# Patient Record
Sex: FEMALE | Race: WHITE | NOT HISPANIC OR LATINO | Employment: OTHER | URBAN - METROPOLITAN AREA
[De-identification: names, ages, dates, MRNs, and addresses within clinical notes are randomized per-mention and may not be internally consistent; named-entity substitution may affect disease eponyms.]

---

## 2018-03-25 ENCOUNTER — HOSPITAL ENCOUNTER (EMERGENCY)
Facility: HOSPITAL | Age: 68
Discharge: HOME | End: 2018-03-25
Attending: EMERGENCY MEDICINE
Payer: MEDICARE

## 2018-03-25 ENCOUNTER — APPOINTMENT (EMERGENCY)
Dept: RADIOLOGY | Facility: HOSPITAL | Age: 68
End: 2018-03-25
Payer: MEDICARE

## 2018-03-25 ENCOUNTER — APPOINTMENT (EMERGENCY)
Dept: ULTRASOUND IMAGING | Facility: HOSPITAL | Age: 68
End: 2018-03-25
Payer: MEDICARE

## 2018-03-25 VITALS
HEART RATE: 99 BPM | RESPIRATION RATE: 20 BRPM | OXYGEN SATURATION: 95 % | HEIGHT: 62 IN | DIASTOLIC BLOOD PRESSURE: 66 MMHG | WEIGHT: 192 LBS | SYSTOLIC BLOOD PRESSURE: 158 MMHG | TEMPERATURE: 97.2 F | BODY MASS INDEX: 35.33 KG/M2

## 2018-03-25 DIAGNOSIS — M71.20 BAKER'S CYST: Primary | ICD-10-CM

## 2018-03-25 PROCEDURE — 73590 X-RAY EXAM OF LOWER LEG: CPT

## 2018-03-25 PROCEDURE — 93971 EXTREMITY STUDY: CPT

## 2018-03-25 PROCEDURE — 96372 THER/PROPH/DIAG INJ SC/IM: CPT

## 2018-03-25 PROCEDURE — 99284 EMERGENCY DEPT VISIT MOD MDM: CPT

## 2018-03-25 RX ORDER — OXYCODONE HYDROCHLORIDE 5 MG/1
5 TABLET ORAL EVERY 4 HOURS PRN
Qty: 12 TABLET | Refills: 0 | Status: SHIPPED | OUTPATIENT
Start: 2018-03-25 | End: 2018-04-04

## 2018-03-25 RX ADMIN — HYDROMORPHONE HYDROCHLORIDE 1 MG: 1 INJECTION, SOLUTION INTRAMUSCULAR; INTRAVENOUS; SUBCUTANEOUS at 16:08
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
## 2018-03-25 NOTE — DISCHARGE INSTRUCTIONS
Bakers Cyst   WHAT YOU NEED TO KNOW:   A Bakers cyst, or popliteal cyst, is a bulging lump behind your knee  Inside the lump is a sac filled with fluid  The cyst is caused by fluid buildup in your knee joint  This can happen if you have a knee injury, such as a cartilage tear  Osteoarthritis or rheumatoid arthritis can also cause an abnormal buildup of joint fluid  DISCHARGE INSTRUCTIONS:   Return to the emergency department if:   · You have severe pain  · You have bruising on the ankle below the cyst     · Your calf turns blue below the cyst     · Your calf or knee is swollen or bleeding  Contact your healthcare provider if:   · You have a fever  · Your pain does not improve with medicine  · You have questions or concerns about your condition or care  Medicines:   · NSAIDs  help decrease swelling and pain  This medicine is available without a doctor's order  Your healthcare provider will tell you which medicine to take and how often to take it  Follow directions  NSAIDs can cause stomach bleeding or kidney problems if they are not taken correctly  · Take your medicine as directed  Contact your healthcare provider if you think your medicine is not helping or if you have side effects  Tell him of her if you are allergic to any medicine  Keep a list of the medicines, vitamins, and herbs you take  Include the amounts, and when and why you take them  Bring the list or the pill bottles to follow-up visits  Carry your medicine list with you in case of an emergency  Care for your knee:   · Rest as needed  Limit movement as your knee heals  This will help decrease the risk of more damage to your knee  You may need crutches to take weight off your injured knee  Use crutches as directed  · Ice your knee  Ice helps decrease swelling and pain  Use an ice pack, or put ice in a plastic bag  Cover the ice pack with a towel and place the ice on your knee for 15 to 20 minutes, 3 to 4 times each day   Do this for 2 to 3 days  · Support your knee  Wrap your knee with an elastic bandage  Ask your healthcare provider if you need a brace for more support  This will help decrease swelling and movement so your knee can heal     · Elevate your knee  Use pillows to raise your knee above the level of your heart as often as you can  This will help decrease swelling  · Go to physical therapy as directed  A physical therapist teaches you exercises to help improve movement and strength, and to decrease pain  Follow up with your healthcare provider as directed:  Write down your questions so you remember to ask them during your visits  © 2017 [facility] Information is for End User's use only and may not be sold, redistributed or otherwise used for commercial purposes  All illustrations and images included in CareNotes® are the copyrighted property of [employer]  or Ricky Murillo  The above information is an  only  It is not intended as medical advice for individual conditions or treatments  Talk to your doctor, nurse or pharmacist before following any medical regimen to see if it is safe and effective for you

## 2018-03-26 PROCEDURE — 93971 EXTREMITY STUDY: CPT | Performed by: SURGERY

## 2019-01-04 RX ORDER — INFLUENZA A VIRUSA/MICHIGAN/45/2015 X-275 (H1N1) ANTIGEN (FORMALDEHYDE INACTIVATED), INFLUENZA A VIRUS A/HONG KONG/4801/2014 X-263B (H3N2) ANTIGEN (FORMALDEHYDE INACTIVATED), AND INFLUENZA B VIRUS B/BRISBANE/60/2008 ANTIGEN (FORMALDEHYDE INACTIVATED) 60; 60; 60 UG/.5ML; UG/.5ML; UG/.5ML
INJECTION, SUSPENSION INTRAMUSCULAR
Refills: 0 | COMMUNITY
Start: 2018-11-19 | End: 2019-07-11

## 2019-01-07 ENCOUNTER — OFFICE VISIT (OUTPATIENT)
Dept: FAMILY MEDICINE CLINIC | Facility: CLINIC | Age: 69
End: 2019-01-07
Payer: COMMERCIAL

## 2019-01-07 VITALS
TEMPERATURE: 96.9 F | DIASTOLIC BLOOD PRESSURE: 94 MMHG | OXYGEN SATURATION: 97 % | HEIGHT: 62 IN | SYSTOLIC BLOOD PRESSURE: 164 MMHG | RESPIRATION RATE: 16 BRPM | HEART RATE: 97 BPM | WEIGHT: 186 LBS | BODY MASS INDEX: 34.23 KG/M2

## 2019-01-07 DIAGNOSIS — Z12.39 SCREENING FOR BREAST CANCER: ICD-10-CM

## 2019-01-07 DIAGNOSIS — J30.2 SEASONAL ALLERGIES: ICD-10-CM

## 2019-01-07 DIAGNOSIS — R73.03 PREDIABETES: ICD-10-CM

## 2019-01-07 DIAGNOSIS — Z13.820 SCREENING FOR OSTEOPOROSIS: ICD-10-CM

## 2019-01-07 DIAGNOSIS — E78.5 DYSLIPIDEMIA: ICD-10-CM

## 2019-01-07 DIAGNOSIS — Z78.0 POST-MENOPAUSAL: ICD-10-CM

## 2019-01-07 DIAGNOSIS — I10 ESSENTIAL HYPERTENSION: Primary | ICD-10-CM

## 2019-01-07 DIAGNOSIS — J06.9 UPPER RESPIRATORY TRACT INFECTION, UNSPECIFIED TYPE: ICD-10-CM

## 2019-01-07 LAB — SL AMB POCT HEMOGLOBIN AIC: 6.3 (ref ?–6.5)

## 2019-01-07 PROCEDURE — 83036 HEMOGLOBIN GLYCOSYLATED A1C: CPT | Performed by: INTERNAL MEDICINE

## 2019-01-07 PROCEDURE — 3725F SCREEN DEPRESSION PERFORMED: CPT | Performed by: INTERNAL MEDICINE

## 2019-01-07 PROCEDURE — 99203 OFFICE O/P NEW LOW 30 MIN: CPT | Performed by: INTERNAL MEDICINE

## 2019-01-07 RX ORDER — CETIRIZINE HYDROCHLORIDE 10 MG/1
10 TABLET ORAL DAILY
Qty: 90 TABLET | Refills: 1 | Status: SHIPPED | OUTPATIENT
Start: 2019-01-07 | End: 2021-12-21

## 2019-01-07 RX ORDER — PANTOPRAZOLE SODIUM 40 MG/1
40 TABLET, DELAYED RELEASE ORAL
COMMUNITY
End: 2019-07-11 | Stop reason: SDUPTHER

## 2019-01-07 RX ORDER — ASCORBATE CALCIUM 500 MG
500 TABLET ORAL
COMMUNITY
End: 2019-01-16

## 2019-01-07 RX ORDER — ATORVASTATIN CALCIUM 40 MG/1
40 TABLET, FILM COATED ORAL DAILY
Qty: 90 TABLET | Refills: 1 | Status: SHIPPED | OUTPATIENT
Start: 2019-01-07 | End: 2019-03-30 | Stop reason: SDUPTHER

## 2019-01-07 RX ORDER — GABAPENTIN 300 MG/1
300 CAPSULE ORAL
COMMUNITY
End: 2020-04-21 | Stop reason: SDUPTHER

## 2019-01-07 RX ORDER — LOSARTAN POTASSIUM 50 MG/1
50 TABLET ORAL
COMMUNITY
End: 2019-01-07 | Stop reason: SDUPTHER

## 2019-01-07 RX ORDER — ALBUTEROL SULFATE 90 UG/1
2 AEROSOL, METERED RESPIRATORY (INHALATION) EVERY 6 HOURS PRN
COMMUNITY
Start: 2018-08-21 | End: 2019-08-21

## 2019-01-07 RX ORDER — ASCORBIC ACID 500 MG
500 TABLET ORAL DAILY
Qty: 90 TABLET | Refills: 1 | Status: SHIPPED | OUTPATIENT
Start: 2019-01-07 | End: 2019-07-11

## 2019-01-07 RX ORDER — CETIRIZINE HYDROCHLORIDE 10 MG/1
10 TABLET ORAL
COMMUNITY
End: 2019-01-07 | Stop reason: SDUPTHER

## 2019-01-07 RX ORDER — LOSARTAN POTASSIUM 50 MG/1
50 TABLET ORAL DAILY
Qty: 90 TABLET | Refills: 1 | Status: SHIPPED | OUTPATIENT
Start: 2019-01-07 | End: 2019-03-30 | Stop reason: SDUPTHER

## 2019-01-07 RX ORDER — AMLODIPINE BESYLATE 5 MG/1
5 TABLET ORAL
COMMUNITY
End: 2019-01-07 | Stop reason: SDUPTHER

## 2019-01-07 RX ORDER — ATORVASTATIN CALCIUM 40 MG/1
40 TABLET, FILM COATED ORAL
COMMUNITY
End: 2019-01-07 | Stop reason: SDUPTHER

## 2019-01-07 RX ORDER — AMOXICILLIN 500 MG/1
500 CAPSULE ORAL EVERY 8 HOURS SCHEDULED
Qty: 30 CAPSULE | Refills: 0 | Status: SHIPPED | OUTPATIENT
Start: 2019-01-07 | End: 2019-01-17

## 2019-01-07 RX ORDER — AMLODIPINE BESYLATE 5 MG/1
5 TABLET ORAL DAILY
Qty: 90 TABLET | Refills: 1 | Status: SHIPPED | OUTPATIENT
Start: 2019-01-07 | End: 2019-03-30 | Stop reason: SDUPTHER

## 2019-01-07 RX ORDER — METFORMIN HYDROCHLORIDE 500 MG/1
500 TABLET, FILM COATED, EXTENDED RELEASE ORAL DAILY
COMMUNITY
End: 2019-07-11 | Stop reason: SDUPTHER

## 2019-01-07 NOTE — PROGRESS NOTES
Assessment/Plan:         Diagnoses and all orders for this visit:    Essential hypertension  Comments:  reorder med and recheck bp before her travels  Orders:  -     losartan (COZAAR) 50 mg tablet; Take 1 tablet (50 mg total) by mouth daily for 90 days  -     amLODIPine (NORVASC) 5 mg tablet; Take 1 tablet (5 mg total) by mouth daily    Screening for breast cancer  -     Mammo screening bilateral w cad; Future    Screening for osteoporosis  -     DXA bone density spine hip and pelvis; Future    Prediabetes  Comments:  maintain metformen  Orders:  -     POCT hemoglobin A1c  -     Microalbumin / creatinine urine ratio    Dyslipidemia  -     atorvastatin (LIPITOR) 40 mg tablet; Take 1 tablet (40 mg total) by mouth daily for 90 days  -     Lipid panel; Future  -     Comprehensive metabolic panel; Future    Upper respiratory tract infection, unspecified type  Comments:  rx amox  Orders:  -     ascorbic acid (VITAMIN C) 500 mg tablet; Take 1 tablet (500 mg total) by mouth daily  -     amoxicillin (AMOXIL) 500 mg capsule; Take 1 capsule (500 mg total) by mouth every 8 (eight) hours for 10 days    Seasonal allergies  Comments:  continue zyrtec  Orders:  -     cetirizine (ZyrTEC) 10 mg tablet; Take 1 tablet (10 mg total) by mouth daily for 90 days    Post-menopausal  -     Calcium Carbonate-Vitamin D 600-400 MG-UNIT per chew tablet; Chew 1 tablet 2 (two) times a day for 90 days    Other orders  -     FLUZONE HIGH-DOSE 0 5 ML JOSE A; TO BE GIVEN BY PHARMACIST PER STANDING ORDER  -     albuterol (PROAIR HFA) 90 mcg/act inhaler; Inhale 2 puffs every 6 (six) hours as needed  -     Discontinue: amLODIPine (NORVASC) 5 mg tablet; Take 5 mg by mouth  -     Calcium Ascorbate 500 MG TABS; Take 500 mg by mouth  -     Discontinue: atorvastatin (LIPITOR) 40 mg tablet; Take 40 mg by mouth  -     Discontinue: Calcium Carbonate-Vitamin D 600-400 MG-UNIT per chew tablet; Chew  -     Discontinue: cetirizine (ZyrTEC) 10 mg tablet;  Take 10 mg by mouth  -     Cranberry 450 MG TABS; Take by mouth  -     gabapentin (NEURONTIN) 300 mg capsule; Take 300 mg by mouth bid   -     Discontinue: losartan (COZAAR) 50 mg tablet; Take 50 mg by mouth  -     metFORMIN (GLUMETZA) 500 MG (MOD) 24 hr tablet; Take 500 mg by mouth daily  -     Multiple Vitamins-Minerals (MULTIVITAMIN ADULT PO); Take by mouth  -     pantoprazole (PROTONIX) 40 mg tablet; Take 40 mg by mouth          Subjective:      Patient ID: Henna Lopez is a 79 y o  female  Pt ran out of bp meds  Needs rx  Denies cp/sob/headaches  Pt states she is prediabetic  Denies neuropathy, ckd, retinopathy   +low back pain for which she takes gabapentine  +face pressure  a1c today 6 3        The following portions of the patient's history were reviewed and updated as appropriate:   She  has a past medical history of Asthma; Back pain; COPD (chronic obstructive pulmonary disease) ([facility] [address] ); COPD (chronic obstructive pulmonary disease) ([facility] [address] ); DVT (deep venous thrombosis) ([facility] ); GERD (gastroesophageal reflux disease); Hyperlipidemia; Hypertension; and Pre-diabetes  She   Patient Active Problem List    Diagnosis Date Noted    Essential hypertension 2019    Prediabetes 2019     She  has a past surgical history that includes  section; Colonoscopy; Hysterectomy; Appendectomy; Esophagogastroduodenoscopy; Breast lumpectomy (Left); and Breast biopsy  Her family history is not on file  She  reports that she has been smoking  She has never used smokeless tobacco  She reports that she does not drink alcohol or use drugs    Current Outpatient Prescriptions   Medication Sig Dispense Refill    albuterol (PROAIR HFA) 90 mcg/act inhaler Inhale 2 puffs every 6 (six) hours as needed      amLODIPine (NORVASC) 5 mg tablet Take 1 tablet (5 mg total) by mouth daily 90 tablet 1    atorvastatin (LIPITOR) 40 mg tablet Take 1 tablet (40 mg total) by mouth daily for 90 days 90 tablet 1    Calcium Ascorbate 500 MG TABS Take 500 mg by mouth      Calcium Carbonate-Vitamin D 600-400 MG-UNIT per chew tablet Chew 1 tablet 2 (two) times a day for 90 days 180 tablet 0    cetirizine (ZyrTEC) 10 mg tablet Take 1 tablet (10 mg total) by mouth daily for 90 days 90 tablet 1    Cranberry 450 MG TABS Take by mouth      gabapentin (NEURONTIN) 300 mg capsule Take 300 mg by mouth bid       losartan (COZAAR) 50 mg tablet Take 1 tablet (50 mg total) by mouth daily for 90 days 90 tablet 1    metFORMIN (GLUMETZA) 500 MG (MOD) 24 hr tablet Take 500 mg by mouth daily      Multiple Vitamins-Minerals (MULTIVITAMIN ADULT PO) Take by mouth      pantoprazole (PROTONIX) 40 mg tablet Take 40 mg by mouth      amoxicillin (AMOXIL) 500 mg capsule Take 1 capsule (500 mg total) by mouth every 8 (eight) hours for 10 days 30 capsule 0    ascorbic acid (VITAMIN C) 500 mg tablet Take 1 tablet (500 mg total) by mouth daily 90 tablet 1    FLUZONE HIGH-DOSE 0 5 ML JOSE A TO BE GIVEN BY PHARMACIST PER STANDING ORDER  0     No current facility-administered medications for this visit  No current outpatient prescriptions on file prior to visit  No current facility-administered medications on file prior to visit  She is allergic to sulfa antibiotics       Review of Systems   Constitutional: Negative  Negative for fever  HENT: Positive for sinus pressure  Respiratory: Negative  Cardiovascular: Negative  Musculoskeletal: Positive for back pain  Neurological: Negative for headaches  Objective:      /94 (BP Location: Left arm, Patient Position: Sitting, Cuff Size: Large)   Pulse 97   Temp (!) 96 9 °F (36 1 °C) (Tympanic)   Resp 16   Ht 5' 1 5" (1 562 m)   Wt 84 4 kg (186 lb)   SpO2 97%   BMI 34 58 kg/m²          Physical Exam   Constitutional: She appears well-developed and well-nourished  HENT:   Head: Normocephalic and atraumatic     Right Ear: External ear normal    Left Ear: External ear normal    Nose: Nose normal    Mouth/Throat: Oropharynx is clear and moist  No oropharyngeal exudate  Neck: Normal range of motion  Neck supple  No tracheal deviation present  No thyromegaly present  Cardiovascular: Normal rate, regular rhythm and normal heart sounds  Exam reveals no gallop and no friction rub  No murmur heard  Pulmonary/Chest: Effort normal and breath sounds normal  No respiratory distress  She has no wheezes  She has no rales  Lymphadenopathy:     She has no cervical adenopathy

## 2019-01-10 ENCOUNTER — APPOINTMENT (OUTPATIENT)
Dept: LAB | Facility: MEDICAL CENTER | Age: 69
End: 2019-01-10
Payer: COMMERCIAL

## 2019-01-10 DIAGNOSIS — E78.5 DYSLIPIDEMIA: ICD-10-CM

## 2019-01-10 LAB
ALBUMIN SERPL BCP-MCNC: 3.6 G/DL (ref 3.5–5)
ALP SERPL-CCNC: 62 U/L (ref 46–116)
ALT SERPL W P-5'-P-CCNC: 20 U/L (ref 12–78)
ANION GAP SERPL CALCULATED.3IONS-SCNC: 7 MMOL/L (ref 4–13)
AST SERPL W P-5'-P-CCNC: 15 U/L (ref 5–45)
BILIRUB SERPL-MCNC: 0.55 MG/DL (ref 0.2–1)
BUN SERPL-MCNC: 17 MG/DL (ref 5–25)
CALCIUM SERPL-MCNC: 9.6 MG/DL (ref 8.3–10.1)
CHLORIDE SERPL-SCNC: 104 MMOL/L (ref 100–108)
CHOLEST SERPL-MCNC: 185 MG/DL (ref 50–200)
CO2 SERPL-SCNC: 27 MMOL/L (ref 21–32)
CREAT SERPL-MCNC: 1.12 MG/DL (ref 0.6–1.3)
CREAT UR-MCNC: 194 MG/DL
GFR SERPL CREATININE-BSD FRML MDRD: 51 ML/MIN/1.73SQ M
GLUCOSE P FAST SERPL-MCNC: 79 MG/DL (ref 65–99)
HDLC SERPL-MCNC: 39 MG/DL (ref 40–60)
LDLC SERPL CALC-MCNC: 76 MG/DL (ref 0–100)
MICROALBUMIN UR-MCNC: 83.5 MG/L (ref 0–20)
MICROALBUMIN/CREAT 24H UR: 43 MG/G CREATININE (ref 0–30)
NONHDLC SERPL-MCNC: 146 MG/DL
POTASSIUM SERPL-SCNC: 4 MMOL/L (ref 3.5–5.3)
PROT SERPL-MCNC: 7.4 G/DL (ref 6.4–8.2)
SODIUM SERPL-SCNC: 138 MMOL/L (ref 136–145)
TRIGL SERPL-MCNC: 351 MG/DL

## 2019-01-10 PROCEDURE — 82043 UR ALBUMIN QUANTITATIVE: CPT | Performed by: INTERNAL MEDICINE

## 2019-01-10 PROCEDURE — 80061 LIPID PANEL: CPT

## 2019-01-10 PROCEDURE — 82570 ASSAY OF URINE CREATININE: CPT | Performed by: INTERNAL MEDICINE

## 2019-01-10 PROCEDURE — 80053 COMPREHEN METABOLIC PANEL: CPT

## 2019-01-10 PROCEDURE — 36415 COLL VENOUS BLD VENIPUNCTURE: CPT

## 2019-01-16 ENCOUNTER — OFFICE VISIT (OUTPATIENT)
Dept: FAMILY MEDICINE CLINIC | Facility: CLINIC | Age: 69
End: 2019-01-16
Payer: COMMERCIAL

## 2019-01-16 VITALS
HEIGHT: 62 IN | DIASTOLIC BLOOD PRESSURE: 76 MMHG | TEMPERATURE: 97.4 F | BODY MASS INDEX: 34.23 KG/M2 | OXYGEN SATURATION: 94 % | SYSTOLIC BLOOD PRESSURE: 136 MMHG | HEART RATE: 114 BPM | RESPIRATION RATE: 16 BRPM | WEIGHT: 186 LBS

## 2019-01-16 DIAGNOSIS — R73.03 PREDIABETES: ICD-10-CM

## 2019-01-16 DIAGNOSIS — I10 ESSENTIAL HYPERTENSION: Primary | ICD-10-CM

## 2019-01-16 DIAGNOSIS — J44.9 CHRONIC OBSTRUCTIVE PULMONARY DISEASE, UNSPECIFIED COPD TYPE (HCC): ICD-10-CM

## 2019-01-16 DIAGNOSIS — E78.5 HYPERLIPIDEMIA, UNSPECIFIED HYPERLIPIDEMIA TYPE: ICD-10-CM

## 2019-01-16 PROBLEM — I82.409 DVT (DEEP VENOUS THROMBOSIS) (HCC): Status: RESOLVED | Noted: 2019-01-16 | Resolved: 2019-01-16

## 2019-01-16 PROCEDURE — 3075F SYST BP GE 130 - 139MM HG: CPT | Performed by: INTERNAL MEDICINE

## 2019-01-16 PROCEDURE — 1160F RVW MEDS BY RX/DR IN RCRD: CPT | Performed by: INTERNAL MEDICINE

## 2019-01-16 PROCEDURE — 99214 OFFICE O/P EST MOD 30 MIN: CPT | Performed by: INTERNAL MEDICINE

## 2019-01-16 PROCEDURE — 3078F DIAST BP <80 MM HG: CPT | Performed by: INTERNAL MEDICINE

## 2019-01-16 PROCEDURE — 3008F BODY MASS INDEX DOCD: CPT | Performed by: INTERNAL MEDICINE

## 2019-01-16 NOTE — PROGRESS NOTES
Assessment/Plan:         Diagnoses and all orders for this visit:    Essential hypertension  Comments:  stable back on meds    Prediabetes  Comments:  on metformen    Hyperlipidemia, unspecified hyperlipidemia type  Comments:  on statin    Chronic obstructive pulmonary disease, unspecified COPD type ([place] [address] )  Comments:  has inhaler          Subjective:      Patient ID: Henna Lopez is a 79 y o  female  Pt relates she had the sweats at night I asked her to check bld gluc if this happens agin to r/o hypoglycemia  Denies cp/sob -h/a   +tob use not in the mind set to quite  Denies sob  Not needing b-agonist   States she is not having side affects from b-agonist        The following portions of the patient's history were reviewed and updated as appropriate:   She  has a past medical history of Asthma; Back pain; COPD (chronic obstructive pulmonary disease) ([place] [address] ); COPD (chronic obstructive pulmonary disease) ([place] [address] ); DVT (deep venous thrombosis) ([place] [address] ); GERD (gastroesophageal reflux disease); Hyperlipidemia; Hypertension; and Pre-diabetes  She   Patient Active Problem List    Diagnosis Date Noted    Hyperlipidemia 2019    COPD (chronic obstructive pulmonary disease) ([place] [address] ) 2019    Essential hypertension 2019    Prediabetes 2019     She  has a past surgical history that includes  section; Colonoscopy; Hysterectomy; Appendectomy; Esophagogastroduodenoscopy; Breast lumpectomy (Left); and Breast biopsy  Her family history is not on file  She  reports that she has been smoking  She has never used smokeless tobacco  She reports that she does not drink alcohol or use drugs    Current Outpatient Prescriptions   Medication Sig Dispense Refill    albuterol (PROAIR HFA) 90 mcg/act inhaler Inhale 2 puffs every 6 (six) hours as needed      amLODIPine (NORVASC) 5 mg tablet Take 1 tablet (5 mg total) by mouth daily 90 tablet 1    ascorbic acid (VITAMIN C) 500 mg tablet Take 1 tablet (500 mg total) by mouth daily 90 tablet 1    atorvastatin (LIPITOR) 40 mg tablet Take 1 tablet (40 mg total) by mouth daily for 90 days 90 tablet 1    Calcium Carbonate-Vitamin D 600-400 MG-UNIT per chew tablet Chew 1 tablet 2 (two) times a day for 90 days 180 tablet 0    cetirizine (ZyrTEC) 10 mg tablet Take 1 tablet (10 mg total) by mouth daily for 90 days 90 tablet 1    Cranberry 450 MG TABS Take by mouth      gabapentin (NEURONTIN) 300 mg capsule Take 300 mg by mouth bid       losartan (COZAAR) 50 mg tablet Take 1 tablet (50 mg total) by mouth daily for 90 days 90 tablet 1    metFORMIN (GLUMETZA) 500 MG (MOD) 24 hr tablet Take 500 mg by mouth daily      Multiple Vitamins-Minerals (MULTIVITAMIN ADULT PO) Take by mouth      pantoprazole (PROTONIX) 40 mg tablet Take 40 mg by mouth      FLUZONE HIGH-DOSE 0 5 ML JOSE A TO BE GIVEN BY PHARMACIST PER STANDING ORDER  0     No current facility-administered medications for this visit        Current Outpatient Prescriptions on File Prior to Visit   Medication Sig    albuterol (PROAIR HFA) 90 mcg/act inhaler Inhale 2 puffs every 6 (six) hours as needed    amLODIPine (NORVASC) 5 mg tablet Take 1 tablet (5 mg total) by mouth daily    [] amoxicillin (AMOXIL) 500 mg capsule Take 1 capsule (500 mg total) by mouth every 8 (eight) hours for 10 days    ascorbic acid (VITAMIN C) 500 mg tablet Take 1 tablet (500 mg total) by mouth daily    atorvastatin (LIPITOR) 40 mg tablet Take 1 tablet (40 mg total) by mouth daily for 90 days    Calcium Carbonate-Vitamin D 600-400 MG-UNIT per chew tablet Chew 1 tablet 2 (two) times a day for 90 days    cetirizine (ZyrTEC) 10 mg tablet Take 1 tablet (10 mg total) by mouth daily for 90 days    Cranberry 450 MG TABS Take by mouth    gabapentin (NEURONTIN) 300 mg capsule Take 300 mg by mouth bid     losartan (COZAAR) 50 mg tablet Take 1 tablet (50 mg total) by mouth daily for 90 days    metFORMIN (GLUMETZA) 500 MG (MOD) 24 hr tablet Take 500 mg by mouth daily    Multiple Vitamins-Minerals (MULTIVITAMIN ADULT PO) Take by mouth    pantoprazole (PROTONIX) 40 mg tablet Take 40 mg by mouth    FLUZONE HIGH-DOSE 0 5 ML JOSE A TO BE GIVEN BY PHARMACIST PER STANDING ORDER     No current facility-administered medications on file prior to visit  She is allergic to sulfa antibiotics       Review of Systems   Constitutional: Negative  HENT: Negative  Eyes: Negative for visual disturbance  Respiratory: Negative  Cardiovascular: Negative  Objective:      /76 (BP Location: Left arm, Patient Position: Sitting, Cuff Size: Large)   Pulse (!) 114   Temp (!) 97 4 °F (36 3 °C) (Tympanic)   Resp 16   Ht 5' 1 5" (1 562 m)   Wt 84 4 kg (186 lb)   SpO2 94%   BMI 34 58 kg/m²          Physical Exam   Constitutional: She appears well-developed and well-nourished  No distress  HENT:   Head: Normocephalic and atraumatic  Right Ear: External ear normal    Left Ear: External ear normal    Nose: Nose normal    Mouth/Throat: Oropharynx is clear and moist  No oropharyngeal exudate  Neck: Normal range of motion  Neck supple  No tracheal deviation present  No thyromegaly present  Cardiovascular: Normal rate, regular rhythm and normal heart sounds  Exam reveals no friction rub  No murmur heard  Pulmonary/Chest: Effort normal and breath sounds normal  No respiratory distress  Abdominal: Soft  Bowel sounds are normal  She exhibits no distension  There is no tenderness  There is no rebound and no guarding  Lymphadenopathy:     She has cervical adenopathy  Skin: She is not diaphoretic

## 2019-03-30 DIAGNOSIS — I10 ESSENTIAL HYPERTENSION: ICD-10-CM

## 2019-03-30 DIAGNOSIS — E78.5 DYSLIPIDEMIA: ICD-10-CM

## 2019-03-31 RX ORDER — LOSARTAN POTASSIUM 50 MG/1
TABLET ORAL
Qty: 90 TABLET | Refills: 1 | Status: SHIPPED | OUTPATIENT
Start: 2019-03-31 | End: 2019-04-11 | Stop reason: SDUPTHER

## 2019-03-31 RX ORDER — ATORVASTATIN CALCIUM 40 MG/1
TABLET, FILM COATED ORAL
Qty: 90 TABLET | Refills: 1 | Status: SHIPPED | OUTPATIENT
Start: 2019-03-31 | End: 2019-04-11 | Stop reason: SDUPTHER

## 2019-03-31 RX ORDER — AMLODIPINE BESYLATE 5 MG/1
TABLET ORAL
Qty: 90 TABLET | Refills: 1 | Status: SHIPPED | OUTPATIENT
Start: 2019-03-31 | End: 2019-04-11 | Stop reason: SDUPTHER

## 2019-04-10 RX ORDER — FLUTICASONE PROPIONATE 50 MCG
2 SPRAY, SUSPENSION (ML) NASAL
COMMUNITY
Start: 2018-08-21 | End: 2019-10-15

## 2019-04-10 RX ORDER — DOCUSATE SODIUM 100 MG/1
100 CAPSULE, LIQUID FILLED ORAL
COMMUNITY

## 2019-04-11 ENCOUNTER — OFFICE VISIT (OUTPATIENT)
Dept: FAMILY MEDICINE CLINIC | Facility: CLINIC | Age: 69
End: 2019-04-11
Payer: COMMERCIAL

## 2019-04-11 VITALS
RESPIRATION RATE: 16 BRPM | DIASTOLIC BLOOD PRESSURE: 72 MMHG | WEIGHT: 193 LBS | TEMPERATURE: 96 F | BODY MASS INDEX: 35.51 KG/M2 | HEIGHT: 62 IN | HEART RATE: 110 BPM | SYSTOLIC BLOOD PRESSURE: 142 MMHG | OXYGEN SATURATION: 95 %

## 2019-04-11 DIAGNOSIS — E78.5 DYSLIPIDEMIA: ICD-10-CM

## 2019-04-11 DIAGNOSIS — I10 ESSENTIAL HYPERTENSION: ICD-10-CM

## 2019-04-11 DIAGNOSIS — E78.5 HYPERLIPIDEMIA, UNSPECIFIED HYPERLIPIDEMIA TYPE: ICD-10-CM

## 2019-04-11 DIAGNOSIS — Z00.00 MEDICARE ANNUAL WELLNESS VISIT, INITIAL: Primary | ICD-10-CM

## 2019-04-11 DIAGNOSIS — J44.9 CHRONIC OBSTRUCTIVE PULMONARY DISEASE, UNSPECIFIED COPD TYPE (HCC): ICD-10-CM

## 2019-04-11 DIAGNOSIS — Z23 ENCOUNTER FOR IMMUNIZATION: ICD-10-CM

## 2019-04-11 DIAGNOSIS — J32.9 SINUSITIS, UNSPECIFIED CHRONICITY, UNSPECIFIED LOCATION: ICD-10-CM

## 2019-04-11 DIAGNOSIS — R73.03 PREDIABETES: ICD-10-CM

## 2019-04-11 PROCEDURE — 99214 OFFICE O/P EST MOD 30 MIN: CPT | Performed by: INTERNAL MEDICINE

## 2019-04-11 PROCEDURE — 1125F AMNT PAIN NOTED PAIN PRSNT: CPT | Performed by: INTERNAL MEDICINE

## 2019-04-11 PROCEDURE — G0438 PPPS, INITIAL VISIT: HCPCS | Performed by: INTERNAL MEDICINE

## 2019-04-11 PROCEDURE — 1170F FXNL STATUS ASSESSED: CPT | Performed by: INTERNAL MEDICINE

## 2019-04-11 PROCEDURE — 90670 PCV13 VACCINE IM: CPT

## 2019-04-11 PROCEDURE — 4040F PNEUMOC VAC/ADMIN/RCVD: CPT

## 2019-04-11 PROCEDURE — 3008F BODY MASS INDEX DOCD: CPT | Performed by: INTERNAL MEDICINE

## 2019-04-11 PROCEDURE — 1160F RVW MEDS BY RX/DR IN RCRD: CPT | Performed by: INTERNAL MEDICINE

## 2019-04-11 PROCEDURE — G0009 ADMIN PNEUMOCOCCAL VACCINE: HCPCS

## 2019-04-11 RX ORDER — LOSARTAN POTASSIUM 50 MG/1
50 TABLET ORAL DAILY
Qty: 90 TABLET | Refills: 1 | Status: SHIPPED | OUTPATIENT
Start: 2019-04-11 | End: 2019-10-15 | Stop reason: SDUPTHER

## 2019-04-11 RX ORDER — AMLODIPINE BESYLATE 5 MG/1
5 TABLET ORAL DAILY
Qty: 90 TABLET | Refills: 1 | Status: SHIPPED | OUTPATIENT
Start: 2019-04-11 | End: 2019-10-15 | Stop reason: SDUPTHER

## 2019-04-11 RX ORDER — ATORVASTATIN CALCIUM 40 MG/1
40 TABLET, FILM COATED ORAL DAILY
Qty: 90 TABLET | Refills: 1 | Status: SHIPPED | OUTPATIENT
Start: 2019-04-11 | End: 2019-10-15 | Stop reason: SDUPTHER

## 2019-04-11 RX ORDER — AMOXICILLIN 500 MG/1
500 CAPSULE ORAL EVERY 8 HOURS SCHEDULED
Qty: 30 CAPSULE | Refills: 0 | Status: SHIPPED | OUTPATIENT
Start: 2019-04-11 | End: 2019-04-21

## 2019-05-22 ENCOUNTER — HOSPITAL ENCOUNTER (OUTPATIENT)
Dept: RADIOLOGY | Facility: MEDICAL CENTER | Age: 69
Discharge: HOME | End: 2019-05-22
Payer: COMMERCIAL

## 2019-05-22 VITALS — WEIGHT: 193 LBS | HEIGHT: 62 IN | BODY MASS INDEX: 35.51 KG/M2

## 2019-05-22 DIAGNOSIS — Z12.39 SCREENING FOR BREAST CANCER: ICD-10-CM

## 2019-05-22 DIAGNOSIS — Z13.820 SCREENING FOR OSTEOPOROSIS: ICD-10-CM

## 2019-05-22 PROCEDURE — 77080 DXA BONE DENSITY AXIAL: CPT

## 2019-05-22 PROCEDURE — 77067 SCR MAMMO BI INCL CAD: CPT

## 2019-07-02 DIAGNOSIS — J44.9 CHRONIC OBSTRUCTIVE PULMONARY DISEASE, UNSPECIFIED COPD TYPE (HCC): ICD-10-CM

## 2019-07-11 ENCOUNTER — OFFICE VISIT (OUTPATIENT)
Dept: FAMILY MEDICINE CLINIC | Facility: CLINIC | Age: 69
End: 2019-07-11
Payer: COMMERCIAL

## 2019-07-11 VITALS
WEIGHT: 191 LBS | HEIGHT: 62 IN | DIASTOLIC BLOOD PRESSURE: 78 MMHG | OXYGEN SATURATION: 93 % | RESPIRATION RATE: 16 BRPM | BODY MASS INDEX: 35.15 KG/M2 | TEMPERATURE: 97.1 F | SYSTOLIC BLOOD PRESSURE: 140 MMHG | HEART RATE: 109 BPM

## 2019-07-11 DIAGNOSIS — J44.9 CHRONIC OBSTRUCTIVE PULMONARY DISEASE, UNSPECIFIED COPD TYPE (HCC): ICD-10-CM

## 2019-07-11 DIAGNOSIS — R73.03 PREDIABETES: Primary | ICD-10-CM

## 2019-07-11 DIAGNOSIS — J40 BRONCHITIS: ICD-10-CM

## 2019-07-11 DIAGNOSIS — I10 ESSENTIAL HYPERTENSION: ICD-10-CM

## 2019-07-11 DIAGNOSIS — E78.5 HYPERLIPIDEMIA, UNSPECIFIED HYPERLIPIDEMIA TYPE: ICD-10-CM

## 2019-07-11 DIAGNOSIS — R10.13 DYSPEPSIA: ICD-10-CM

## 2019-07-11 PROCEDURE — 99214 OFFICE O/P EST MOD 30 MIN: CPT | Performed by: INTERNAL MEDICINE

## 2019-07-11 PROCEDURE — 1160F RVW MEDS BY RX/DR IN RCRD: CPT | Performed by: INTERNAL MEDICINE

## 2019-07-11 PROCEDURE — 3008F BODY MASS INDEX DOCD: CPT | Performed by: INTERNAL MEDICINE

## 2019-07-11 RX ORDER — METFORMIN HYDROCHLORIDE 500 MG/1
500 TABLET, FILM COATED, EXTENDED RELEASE ORAL DAILY
Qty: 90 TABLET | Refills: 1 | Status: SHIPPED | OUTPATIENT
Start: 2019-07-11 | End: 2019-10-15

## 2019-07-11 RX ORDER — PANTOPRAZOLE SODIUM 40 MG/1
40 TABLET, DELAYED RELEASE ORAL DAILY
Qty: 90 TABLET | Refills: 1 | Status: SHIPPED | OUTPATIENT
Start: 2019-07-11 | End: 2019-10-15 | Stop reason: SDUPTHER

## 2019-07-11 RX ORDER — AZITHROMYCIN 250 MG/1
500 TABLET, FILM COATED ORAL EVERY 24 HOURS
Qty: 6 TABLET | Refills: 0 | Status: SHIPPED | OUTPATIENT
Start: 2019-07-11 | End: 2019-07-16

## 2019-07-11 NOTE — PROGRESS NOTES
Assessment/Plan:         Diagnoses and all orders for this visit:    Prediabetes  Comments:  check lab q 6 months  Orders:  -     metFORMIN (GLUMETZA) 500 MG (MOD) 24 hr tablet; Take 1 tablet (500 mg total) by mouth daily  -     Hemoglobin A1C; Future  -     Comprehensive metabolic panel; Future  -     metFORMIN (GLUCOPHAGE-XR) 500 mg 24 hr tablet; Take 1 tablet (500 mg total) by mouth daily with dinner    Chronic obstructive pulmonary disease, unspecified COPD type ([facility] )  Comments:  check spirometry after off antibiotic    Bronchitis  Comments:  rx zpak  Orders:  -     azithromycin (ZITHROMAX) 250 mg tablet; Take 2 tablets (500 mg total) by mouth every 24 hours for 5 days 2 tabs today then one tab daily  -     CBC; Future    Dyspepsia  Comments:  on ppi  Orders:  -     pantoprazole (PROTONIX) 40 mg tablet; Take 1 tablet (40 mg total) by mouth daily    Essential hypertension  Comments:  stable on meds    Hyperlipidemia, unspecified hyperlipidemia type  Comments:  on statin  Orders:  -     Lipid panel; Future    Other orders  -     Cancel: POCT spirometry  -     B Complex-C (SUPER B COMPLEX/VITAMIN C PO); Take by mouth          Subjective:      Patient ID: Yaritza Cuello is a 79 y o  female  Pt in for prediabetic check  Also bp check and due for lab  +hoarse denies f/s/c  Denies sore throat  +prod cough  The following portions of the patient's history were reviewed and updated as appropriate: She  has a past medical history of Asthma, Back pain, COPD (chronic obstructive pulmonary disease) ([facility] ), COPD (chronic obstructive pulmonary disease) ([facility] ), D[facility] (deep venous thrombosis) ([facility] ), GERD (gastroesophageal reflux disease), Hyperlipidemia, Hypertension, and Pre-diabetes    She   Patient Active Problem List    Diagnosis Date Noted    Hyperlipidemia 01/16/2019    COPD (chronic obstructive pulmonary disease) ([facility] ) 01/16/2019    Essential hypertension 01/07/2019    Prediabetes 01/07/2019     She  has a past surgical history that includes  section; Colonoscopy; Hysterectomy; Appendectomy; Esophagogastroduodenoscopy; Breast biopsy; and Breast cyst excision (Left, 2010)  Her family history includes Colon cancer in her mother; Esophageal cancer in her father; No Known Problems in her daughter, maternal grandfather, maternal grandmother, paternal grandfather, paternal grandmother, and sister  She  reports that she has been smoking  She has been smoking about 1 00 pack per day  She has never used smokeless tobacco  She reports that she does not drink alcohol or use drugs    Current Outpatient Medications   Medication Sig Dispense Refill    albuterol (PROAIR HFA) 90 mcg/act inhaler Inhale 2 puffs every 6 (six) hours as needed      amLODIPine (NORVASC) 5 mg tablet Take 1 tablet (5 mg total) by mouth daily 90 tablet 1    atorvastatin (LIPITOR) 40 mg tablet Take 1 tablet (40 mg total) by mouth daily 90 tablet 1    B Complex-C (SUPER B COMPLEX/VITAMIN C PO) Take by mouth      Cranberry 450 MG TABS Take by mouth      docusate sodium (COLACE) 100 mg capsule Take 100 mg by mouth      fluticasone (FLONASE) 50 mcg/act nasal spray 2 sprays into each nostril      fluticasone-salmeterol (ADVAIR DISKUS) 250-50 mcg/dose inhaler Inhale 1 puff 2 (two) times a day 3 Inhaler 1    gabapentin (NEURONTIN) 300 mg capsule Take 300 mg by mouth bid       losartan (COZAAR) 50 mg tablet Take 1 tablet (50 mg total) by mouth daily 90 tablet 1    metFORMIN (GLUMETZA) 500 MG (MOD) 24 hr tablet Take 1 tablet (500 mg total) by mouth daily 90 tablet 1    Multiple Vitamins-Minerals (MULTIVITAMIN ADULT PO) Take by mouth      pantoprazole (PROTONIX) 40 mg tablet Take 1 tablet (40 mg total) by mouth daily 90 tablet 1    Calcium Carbonate-Vitamin D 600-400 MG-UNIT per chew tablet Chew 1 tablet 2 (two) times a day for 90 days 180 tablet 0    cetirizine (ZyrTEC) 10 mg tablet Take 1 tablet (10 mg total) by mouth daily for 90 days 90 tablet 1    metFORMIN (GLUCOPHAGE-XR) 500 mg 24 hr tablet Take 1 tablet (500 mg total) by mouth daily with dinner 30 tablet 1     No current facility-administered medications for this visit  Current Outpatient Medications on File Prior to Visit   Medication Sig    albuterol (PROAIR HFA) 90 mcg/act inhaler Inhale 2 puffs every 6 (six) hours as needed    amLODIPine (NORVASC) 5 mg tablet Take 1 tablet (5 mg total) by mouth daily    atorvastatin (LIPITOR) 40 mg tablet Take 1 tablet (40 mg total) by mouth daily    B Complex-C (SUPER B COMPLEX/VITAMIN C PO) Take by mouth    Cranberry 450 MG TABS Take by mouth    docusate sodium (COLACE) 100 mg capsule Take 100 mg by mouth    fluticasone (FLONASE) 50 mcg/act nasal spray 2 sprays into each nostril    fluticasone-salmeterol (ADVAIR DISKUS) 250-50 mcg/dose inhaler Inhale 1 puff 2 (two) times a day    gabapentin (NEURONTIN) 300 mg capsule Take 300 mg by mouth bid     losartan (COZAAR) 50 mg tablet Take 1 tablet (50 mg total) by mouth daily    Multiple Vitamins-Minerals (MULTIVITAMIN ADULT PO) Take by mouth    Calcium Carbonate-Vitamin D 600-400 MG-UNIT per chew tablet Chew 1 tablet 2 (two) times a day for 90 days    cetirizine (ZyrTEC) 10 mg tablet Take 1 tablet (10 mg total) by mouth daily for 90 days     No current facility-administered medications on file prior to visit  She is allergic to sulfa antibiotics       Review of Systems   Constitutional: Negative for chills and fever  HENT: Positive for congestion and postnasal drip  Negative for sore throat  Respiratory: Positive for cough  Cardiovascular: Negative  Neurological: Negative for headaches           Objective:      /78 (BP Location: Right arm, Patient Position: Sitting, Cuff Size: Large)   Pulse (!) 109   Temp (!) 97 1 °F (36 2 °C) (Tympanic)   Resp 16   Ht 5' 1 5" (1 562 m)   Wt 86 6 kg (191 lb)   SpO2 93%   BMI 35 50 kg/m²          Physical Exam   Constitutional: She appears well-developed and well-nourished  No distress  HENT:   Head: Normocephalic  Right Ear: External ear normal    Left Ear: External ear normal    Nose: Nose normal    Mouth/Throat: Oropharynx is clear and moist  No oropharyngeal exudate  Neck: Normal range of motion  Neck supple  No thyromegaly present  Cardiovascular: Normal rate, regular rhythm, normal heart sounds and intact distal pulses  Exam reveals no gallop and no friction rub  No murmur heard  Pulmonary/Chest: Effort normal and breath sounds normal  No stridor  No respiratory distress  She has no wheezes  She has no rales  Lymphadenopathy:     She has no cervical adenopathy  Skin: She is not diaphoretic  BMI Counseling: Body mass index is 35 5 kg/m²  Discussed the patient's BMI with her  The BMI is above average  BMI counseling and education was provided to the patient  Nutrition recommendations include reducing portion sizes and decreasing overall calorie intake  Exercise recommendations include exercising 3-5 times per week

## 2019-07-12 RX ORDER — METFORMIN HYDROCHLORIDE 500 MG/1
500 TABLET, EXTENDED RELEASE ORAL
Qty: 30 TABLET | Refills: 1 | Status: SHIPPED | OUTPATIENT
Start: 2019-07-12 | End: 2019-08-04 | Stop reason: SDUPTHER

## 2019-07-16 ENCOUNTER — APPOINTMENT (OUTPATIENT)
Dept: LAB | Facility: MEDICAL CENTER | Age: 69
End: 2019-07-16
Payer: COMMERCIAL

## 2019-07-16 DIAGNOSIS — R73.03 PREDIABETES: ICD-10-CM

## 2019-07-16 DIAGNOSIS — E78.5 HYPERLIPIDEMIA, UNSPECIFIED HYPERLIPIDEMIA TYPE: ICD-10-CM

## 2019-07-16 DIAGNOSIS — J40 BRONCHITIS: ICD-10-CM

## 2019-07-16 LAB
ALBUMIN SERPL BCP-MCNC: 3.6 G/DL (ref 3.5–5)
ALP SERPL-CCNC: 68 U/L (ref 46–116)
ALT SERPL W P-5'-P-CCNC: 22 U/L (ref 12–78)
ANION GAP SERPL CALCULATED.3IONS-SCNC: 8 MMOL/L (ref 4–13)
AST SERPL W P-5'-P-CCNC: 21 U/L (ref 5–45)
BILIRUB SERPL-MCNC: 0.48 MG/DL (ref 0.2–1)
BUN SERPL-MCNC: 16 MG/DL (ref 5–25)
CALCIUM SERPL-MCNC: 9.1 MG/DL (ref 8.3–10.1)
CHLORIDE SERPL-SCNC: 107 MMOL/L (ref 100–108)
CHOLEST SERPL-MCNC: 119 MG/DL (ref 50–200)
CO2 SERPL-SCNC: 27 MMOL/L (ref 21–32)
CREAT SERPL-MCNC: 1.19 MG/DL (ref 0.6–1.3)
ERYTHROCYTE [DISTWIDTH] IN BLOOD BY AUTOMATED COUNT: 13.1 % (ref 11.6–15.1)
EST. AVERAGE GLUCOSE BLD GHB EST-MCNC: 140 MG/DL
GFR SERPL CREATININE-BSD FRML MDRD: 48 ML/MIN/1.73SQ M
GLUCOSE P FAST SERPL-MCNC: 128 MG/DL (ref 65–99)
HBA1C MFR BLD: 6.5 % (ref 4.2–6.3)
HCT VFR BLD AUTO: 44.8 % (ref 34.8–46.1)
HDLC SERPL-MCNC: 38 MG/DL (ref 40–60)
HGB BLD-MCNC: 14.4 G/DL (ref 11.5–15.4)
LDLC SERPL CALC-MCNC: 24 MG/DL (ref 0–100)
MCH RBC QN AUTO: 30 PG (ref 26.8–34.3)
MCHC RBC AUTO-ENTMCNC: 32.1 G/DL (ref 31.4–37.4)
MCV RBC AUTO: 93 FL (ref 82–98)
NONHDLC SERPL-MCNC: 81 MG/DL
PLATELET # BLD AUTO: 203 THOUSANDS/UL (ref 149–390)
PMV BLD AUTO: 10.2 FL (ref 8.9–12.7)
POTASSIUM SERPL-SCNC: 4.2 MMOL/L (ref 3.5–5.3)
PROT SERPL-MCNC: 7.1 G/DL (ref 6.4–8.2)
RBC # BLD AUTO: 4.8 MILLION/UL (ref 3.81–5.12)
SODIUM SERPL-SCNC: 142 MMOL/L (ref 136–145)
TRIGL SERPL-MCNC: 286 MG/DL
WBC # BLD AUTO: 7.58 THOUSAND/UL (ref 4.31–10.16)

## 2019-07-16 PROCEDURE — 80053 COMPREHEN METABOLIC PANEL: CPT

## 2019-07-16 PROCEDURE — 83036 HEMOGLOBIN GLYCOSYLATED A1C: CPT

## 2019-07-16 PROCEDURE — 36415 COLL VENOUS BLD VENIPUNCTURE: CPT

## 2019-07-16 PROCEDURE — 85027 COMPLETE CBC AUTOMATED: CPT

## 2019-07-16 PROCEDURE — 80061 LIPID PANEL: CPT

## 2019-08-04 DIAGNOSIS — R73.03 PREDIABETES: ICD-10-CM

## 2019-08-05 RX ORDER — METFORMIN HYDROCHLORIDE 500 MG/1
TABLET, EXTENDED RELEASE ORAL
Qty: 30 TABLET | Refills: 1 | Status: SHIPPED | OUTPATIENT
Start: 2019-08-05 | End: 2019-09-02 | Stop reason: SDUPTHER

## 2019-09-02 DIAGNOSIS — R73.03 PREDIABETES: ICD-10-CM

## 2019-09-04 RX ORDER — METFORMIN HYDROCHLORIDE 500 MG/1
TABLET, EXTENDED RELEASE ORAL
Qty: 30 TABLET | Refills: 1 | Status: SHIPPED | OUTPATIENT
Start: 2019-09-04 | End: 2019-09-26 | Stop reason: SDUPTHER

## 2019-09-26 DIAGNOSIS — R73.03 PREDIABETES: ICD-10-CM

## 2019-09-26 RX ORDER — METFORMIN HYDROCHLORIDE 500 MG/1
TABLET, EXTENDED RELEASE ORAL
Qty: 30 TABLET | Refills: 1 | Status: SHIPPED | OUTPATIENT
Start: 2019-09-26 | End: 2019-10-15 | Stop reason: SDUPTHER

## 2019-10-15 ENCOUNTER — OFFICE VISIT (OUTPATIENT)
Dept: FAMILY MEDICINE CLINIC | Facility: CLINIC | Age: 69
End: 2019-10-15
Payer: COMMERCIAL

## 2019-10-15 VITALS
HEART RATE: 91 BPM | DIASTOLIC BLOOD PRESSURE: 72 MMHG | WEIGHT: 190 LBS | RESPIRATION RATE: 16 BRPM | BODY MASS INDEX: 34.96 KG/M2 | TEMPERATURE: 95.9 F | OXYGEN SATURATION: 94 % | HEIGHT: 62 IN | SYSTOLIC BLOOD PRESSURE: 136 MMHG

## 2019-10-15 DIAGNOSIS — R10.13 DYSPEPSIA: ICD-10-CM

## 2019-10-15 DIAGNOSIS — I10 ESSENTIAL HYPERTENSION: ICD-10-CM

## 2019-10-15 DIAGNOSIS — J06.9 UPPER RESPIRATORY TRACT INFECTION, UNSPECIFIED TYPE: ICD-10-CM

## 2019-10-15 DIAGNOSIS — R40.0 DAYTIME SLEEPINESS: ICD-10-CM

## 2019-10-15 DIAGNOSIS — E11.22 TYPE 2 DIABETES MELLITUS WITH STAGE 3 CHRONIC KIDNEY DISEASE, WITHOUT LONG-TERM CURRENT USE OF INSULIN (HCC): Primary | ICD-10-CM

## 2019-10-15 DIAGNOSIS — J44.9 CHRONIC OBSTRUCTIVE PULMONARY DISEASE, UNSPECIFIED COPD TYPE (HCC): ICD-10-CM

## 2019-10-15 DIAGNOSIS — N18.30 TYPE 2 DIABETES MELLITUS WITH STAGE 3 CHRONIC KIDNEY DISEASE, WITHOUT LONG-TERM CURRENT USE OF INSULIN (HCC): Primary | ICD-10-CM

## 2019-10-15 DIAGNOSIS — Z23 ENCOUNTER FOR IMMUNIZATION: ICD-10-CM

## 2019-10-15 DIAGNOSIS — E78.5 DYSLIPIDEMIA: ICD-10-CM

## 2019-10-15 LAB — FEV1: NORMAL LITERS

## 2019-10-15 PROCEDURE — 3008F BODY MASS INDEX DOCD: CPT | Performed by: INTERNAL MEDICINE

## 2019-10-15 PROCEDURE — 94010 BREATHING CAPACITY TEST: CPT | Performed by: INTERNAL MEDICINE

## 2019-10-15 PROCEDURE — 1101F PT FALLS ASSESS-DOCD LE1/YR: CPT | Performed by: INTERNAL MEDICINE

## 2019-10-15 PROCEDURE — 99214 OFFICE O/P EST MOD 30 MIN: CPT | Performed by: INTERNAL MEDICINE

## 2019-10-15 PROCEDURE — 90662 IIV NO PRSV INCREASED AG IM: CPT | Performed by: INTERNAL MEDICINE

## 2019-10-15 PROCEDURE — 3075F SYST BP GE 130 - 139MM HG: CPT | Performed by: INTERNAL MEDICINE

## 2019-10-15 PROCEDURE — 3078F DIAST BP <80 MM HG: CPT | Performed by: INTERNAL MEDICINE

## 2019-10-15 PROCEDURE — G0008 ADMIN INFLUENZA VIRUS VAC: HCPCS | Performed by: INTERNAL MEDICINE

## 2019-10-15 RX ORDER — METFORMIN HYDROCHLORIDE 500 MG/1
500 TABLET, EXTENDED RELEASE ORAL
Qty: 90 TABLET | Refills: 1 | Status: SHIPPED | OUTPATIENT
Start: 2019-10-15 | End: 2020-04-21 | Stop reason: SDUPTHER

## 2019-10-15 RX ORDER — AMOXICILLIN 500 MG/1
500 CAPSULE ORAL EVERY 8 HOURS SCHEDULED
Qty: 30 CAPSULE | Refills: 0 | Status: SHIPPED | OUTPATIENT
Start: 2019-10-15 | End: 2019-10-25

## 2019-10-15 RX ORDER — PANTOPRAZOLE SODIUM 40 MG/1
40 TABLET, DELAYED RELEASE ORAL DAILY
Qty: 90 TABLET | Refills: 1 | Status: SHIPPED | OUTPATIENT
Start: 2019-10-15 | End: 2020-04-21 | Stop reason: SDUPTHER

## 2019-10-15 RX ORDER — LOSARTAN POTASSIUM 50 MG/1
50 TABLET ORAL DAILY
Qty: 90 TABLET | Refills: 1 | Status: SHIPPED | OUTPATIENT
Start: 2019-10-15 | End: 2020-04-21 | Stop reason: SDUPTHER

## 2019-10-15 RX ORDER — ATORVASTATIN CALCIUM 40 MG/1
40 TABLET, FILM COATED ORAL DAILY
Qty: 90 TABLET | Refills: 1 | Status: SHIPPED | OUTPATIENT
Start: 2019-10-15 | End: 2020-04-21 | Stop reason: SDUPTHER

## 2019-10-15 RX ORDER — AMLODIPINE BESYLATE 5 MG/1
5 TABLET ORAL DAILY
Qty: 90 TABLET | Refills: 1 | Status: SHIPPED | OUTPATIENT
Start: 2019-10-15 | End: 2020-04-21 | Stop reason: SDUPTHER

## 2019-10-15 NOTE — PROGRESS NOTES
Assessment/Plan:         Diagnoses and all orders for this visit:    Type 2 diabetes mellitus with stage 3 chronic kidney disease, without long-term current use of insulin ([facility])  Comments:  rto 3 months  she is aware her prediabetes a1c in creased to type 2 dm  Orders:  -     metFORMIN (GLUCOPHAGE-XR) 500 mg 24 hr tablet; Take 1 tablet (500 mg total) by mouth daily with dinner  -     Hemoglobin A1C; Future  -     Comprehensive metabolic panel; Future    Chronic obstructive pulmonary disease, unspecified COPD type ([facility] )  Comments:  moderate on apirogram  wants to wait to see pulm  consider repeat with uri  Orders:  -     POCT spirometry    Encounter for immunization  -     influenza vaccine, [id number], high-dose, PF 0 5 mL (FLUZONE HIGH-DOSE)    Essential hypertension  Comments:  reorder med and recheck bp before her travels  Orders:  -     amLODIPine (NORVASC) 5 mg tablet; Take 1 tablet (5 mg total) by mouth daily  -     losartan (COZAAR) 50 mg tablet; Take 1 tablet (50 mg total) by mouth daily    Dyslipidemia  Comments:  on statin  Orders:  -     atorvastatin (LIPITOR) 40 mg tablet; Take 1 tablet (40 mg total) by mouth daily    Dyspepsia  Comments:  on ppi  Orders:  -     pantoprazole (PROTONIX) 40 mg tablet; Take 1 tablet (40 mg total) by mouth daily    Upper respiratory tract infection, unspecified type  Comments:  amox    Orders:  -     amoxicillin (AMOXIL) 500 mg capsule; Take 1 capsule (500 mg total) by mouth every 8 (eight) hours for 10 days    Daytime sleepiness  -     Ambulatory referral to Sleep Medicine; Future          Subjective:      Patient ID: Sascha Sanford is a 79 y o  female  Pt a1c went from prediabetes to type 2 dm  On arb/statin    Complains of daytime sleepiness      The following portions of the patient's history were reviewed and updated as appropriate: She  has a past medical history of Asthma, Back pain, COPD (chronic obstructive pulmonary disease) ([facility] ), COPD (chronic obstructive pulmonary disease) ([facility] ), DVT (deep venous thrombosis) ([facility] ), GERD (gastroesophageal reflux disease), Hyperlipidemia, Hypertension, and Pre-diabetes  She   Patient Active Problem List    Diagnosis Date Noted    Hyperlipidemia 2019    COPD (chronic obstructive pulmonary disease) ([facility] ) 2019    Essential hypertension 2019    Prediabetes 2019     She  has a past surgical history that includes  section; Colonoscopy; Hysterectomy; Appendectomy; Esophagogastroduodenoscopy; Breast biopsy; and Breast cyst excision (Left, 2010)  Her family history includes Colon cancer in her mother; Esophageal cancer in her father; No Known Problems in her daughter, maternal grandfather, maternal grandmother, paternal grandfather, paternal grandmother, and sister  She  reports that she has been smoking  She has been smoking about 1 00 pack per day  She has never used smokeless tobacco  She reports that she does not drink alcohol or use drugs    Current Outpatient Medications   Medication Sig Dispense Refill    amLODIPine (NORVASC) 5 mg tablet Take 1 tablet (5 mg total) by mouth daily 90 tablet 1    atorvastatin (LIPITOR) 40 mg tablet Take 1 tablet (40 mg total) by mouth daily 90 tablet 1    B Complex-C (SUPER B COMPLEX/VITAMIN C PO) Take by mouth      Calcium Carbonate-Vitamin D 600-400 MG-UNIT per chew tablet Chew 1 tablet 2 (two) times a day for 90 days 180 tablet 0    cetirizine (ZyrTEC) 10 mg tablet Take 1 tablet (10 mg total) by mouth daily for 90 days 90 tablet 1    Cranberry 450 MG TABS Take by mouth      docusate sodium (COLACE) 100 mg capsule Take 100 mg by mouth      fluticasone-salmeterol (ADVAIR DISKUS) 250-50 mcg/dose inhaler Inhale 1 puff 2 (two) times a day 3 Inhaler 1    gabapentin (NEURONTIN) 300 mg capsule Take 300 mg by mouth bid       losartan (COZAAR) 50 mg tablet Take 1 tablet (50 mg total) by mouth daily 90 tablet 1    metFORMIN (GLUCOPHAGE-XR) 500 mg 24 hr tablet Take 1 tablet (500 mg total) by mouth daily with dinner 90 tablet 1    Multiple Vitamins-Minerals (MULTIVITAMIN ADULT PO) Take by mouth      pantoprazole (PROTONIX) 40 mg tablet Take 1 tablet (40 mg total) by mouth daily 90 tablet 1    amoxicillin (AMOXIL) 500 mg capsule Take 1 capsule (500 mg total) by mouth every 8 (eight) hours for 10 days 30 capsule 0     No current facility-administered medications for this visit  Current Outpatient Medications on File Prior to Visit   Medication Sig    B Complex-C (SUPER B COMPLEX/VITAMIN C PO) Take by mouth    Calcium Carbonate-Vitamin D 600-400 MG-UNIT per chew tablet Chew 1 tablet 2 (two) times a day for 90 days    cetirizine (ZyrTEC) 10 mg tablet Take 1 tablet (10 mg total) by mouth daily for 90 days    Cranberry 450 MG TABS Take by mouth    docusate sodium (COLACE) 100 mg capsule Take 100 mg by mouth    fluticasone-salmeterol (ADVAIR DISKUS) 250-50 mcg/dose inhaler Inhale 1 puff 2 (two) times a day    gabapentin (NEURONTIN) 300 mg capsule Take 300 mg by mouth bid     Multiple Vitamins-Minerals (MULTIVITAMIN ADULT PO) Take by mouth     No current facility-administered medications on file prior to visit  She is allergic to sulfa antibiotics       Review of Systems   Constitutional: Negative  HENT: Negative  Eyes: Negative for visual disturbance  Respiratory: Negative  Cardiovascular: Negative  Neurological: Negative for headaches  Objective:      /72 (BP Location: Left arm, Patient Position: Sitting, Cuff Size: Large)   Pulse 91   Temp (!) 95 9 °F (35 5 °C) (Tympanic)   Resp 16   Ht 5' 1 5" (1 562 m)   Wt 86 2 kg (190 lb)   SpO2 94%   BMI 35 32 kg/m²          Physical Exam   Constitutional: She appears well-developed and well-nourished  No distress  HENT:   Head: Normocephalic and atraumatic     Right Ear: External ear normal    Left Ear: External ear normal    Nose: Nose normal    Mouth/Throat: Oropharynx is clear and moist  No oropharyngeal exudate  Neck: Normal range of motion  Neck supple  No thyromegaly present  Cardiovascular: Normal rate, regular rhythm, normal heart sounds and intact distal pulses  Exam reveals no gallop and no friction rub  No murmur heard  Pulses:       Dorsalis pedis pulses are 2+ on the right side, and 2+ on the left side  Pulmonary/Chest: Effort normal  No stridor  No respiratory distress  She has no wheezes  She has no rales  Feet:   Right Foot:   Skin Integrity: Positive for warmth  Negative for erythema  Left Foot:   Skin Integrity: Positive for warmth  Negative for erythema  Lymphadenopathy:     She has no cervical adenopathy  Skin: She is not diaphoretic  Patient's shoes and socks removed  Right Foot/Ankle   Right Foot Inspection  Skin Exam: warmth no erythema                            Sensory   Vibration: intact    Monofilament testing: intact  Vascular    The right DP pulse is 2+  Left Foot/Ankle  Left Foot Inspection  Skin Exam: warmthno erythema                                         Sensory   Vibration: intact    Monofilament: intact  Vascular    The left DP pulse is 2+  Assign Risk Category:  No deformity present;  No loss of protective sensation;        Risk: 0

## 2019-10-21 ENCOUNTER — APPOINTMENT (OUTPATIENT)
Dept: LAB | Facility: MEDICAL CENTER | Age: 69
End: 2019-10-21
Payer: COMMERCIAL

## 2019-10-21 DIAGNOSIS — N18.30 TYPE 2 DIABETES MELLITUS WITH STAGE 3 CHRONIC KIDNEY DISEASE, WITHOUT LONG-TERM CURRENT USE OF INSULIN (HCC): ICD-10-CM

## 2019-10-21 DIAGNOSIS — E11.22 TYPE 2 DIABETES MELLITUS WITH STAGE 3 CHRONIC KIDNEY DISEASE, WITHOUT LONG-TERM CURRENT USE OF INSULIN (HCC): ICD-10-CM

## 2019-10-21 LAB
ALBUMIN SERPL BCP-MCNC: 4.1 G/DL (ref 3.5–5)
ALP SERPL-CCNC: 64 U/L (ref 46–116)
ALT SERPL W P-5'-P-CCNC: 21 U/L (ref 12–78)
ANION GAP SERPL CALCULATED.3IONS-SCNC: 5 MMOL/L (ref 4–13)
AST SERPL W P-5'-P-CCNC: 13 U/L (ref 5–45)
BILIRUB SERPL-MCNC: 0.44 MG/DL (ref 0.2–1)
BUN SERPL-MCNC: 16 MG/DL (ref 5–25)
CALCIUM SERPL-MCNC: 9.8 MG/DL (ref 8.3–10.1)
CHLORIDE SERPL-SCNC: 107 MMOL/L (ref 100–108)
CO2 SERPL-SCNC: 29 MMOL/L (ref 21–32)
CREAT SERPL-MCNC: 1.13 MG/DL (ref 0.6–1.3)
EST. AVERAGE GLUCOSE BLD GHB EST-MCNC: 146 MG/DL
GFR SERPL CREATININE-BSD FRML MDRD: 51 ML/MIN/1.73SQ M
GLUCOSE P FAST SERPL-MCNC: 118 MG/DL (ref 65–99)
HBA1C MFR BLD: 6.7 % (ref 4.2–6.3)
POTASSIUM SERPL-SCNC: 4.2 MMOL/L (ref 3.5–5.3)
PROT SERPL-MCNC: 7.4 G/DL (ref 6.4–8.2)
SODIUM SERPL-SCNC: 141 MMOL/L (ref 136–145)

## 2019-10-21 PROCEDURE — 83036 HEMOGLOBIN GLYCOSYLATED A1C: CPT

## 2019-10-21 PROCEDURE — 80053 COMPREHEN METABOLIC PANEL: CPT

## 2019-10-21 PROCEDURE — 36415 COLL VENOUS BLD VENIPUNCTURE: CPT

## 2019-11-26 ENCOUNTER — OFFICE VISIT (OUTPATIENT)
Dept: SLEEP CENTER | Facility: CLINIC | Age: 69
End: 2019-11-26
Payer: COMMERCIAL

## 2019-11-26 VITALS
HEIGHT: 62 IN | BODY MASS INDEX: 34.96 KG/M2 | DIASTOLIC BLOOD PRESSURE: 70 MMHG | SYSTOLIC BLOOD PRESSURE: 126 MMHG | WEIGHT: 190 LBS

## 2019-11-26 DIAGNOSIS — R06.83 SNORING: Primary | ICD-10-CM

## 2019-11-26 DIAGNOSIS — F51.02 ADJUSTMENT INSOMNIA: ICD-10-CM

## 2019-11-26 DIAGNOSIS — R40.0 DAYTIME SLEEPINESS: ICD-10-CM

## 2019-11-26 PROCEDURE — 99203 OFFICE O/P NEW LOW 30 MIN: CPT | Performed by: INTERNAL MEDICINE

## 2019-11-26 RX ORDER — ZOLPIDEM TARTRATE 10 MG/1
10 TABLET ORAL
Qty: 1 TABLET | Refills: 0 | Status: SHIPPED | OUTPATIENT
Start: 2019-11-26 | End: 2020-04-21

## 2019-11-26 NOTE — PROGRESS NOTES
Consultation - [address] [facility] : 1950  MRN: [id number]      Assessment:    The patient has symptoms consistent with obstructive sleep apnea including loud snoring, excessive daytime sleepiness as well as hypertension  She requires diagnostic polysomnography, but is not available for testing until after she returns from [place] in April  Plan:   Diagnostic polysomnography  The patient has COPD, which is severe and therefore is not a candidate for home sleep test     Follow up: After testing    History of Present Illness:   79 y  o female  with a longstanding history of loud snoring and excessive daytime sleepiness  The patient awakens with gasping on a regular basis, but has not been observed to stop breathing  She complains of dyspnea with exertion and frequent cough  She continues to smoke cigarettes  She has difficulty breathing when lying supine  She has a lower extremity edema  Her medical history includes COPD, hypertension and diabetes mellitus  Her BMI is 35        Review of Systems      Genitourinary none   Cardiology ankle/leg swelling   Gastrointestinal none   Neurology none   Constitutional none   Integumentary rash or dry skin   Psychiatry none   Musculoskeletal back pain   Pulmonary shortness of breath with activity, frequent cough, snoring and difficulty breathing when lying flat    ENT none   Endocrine none   Hematological none         I have reviewed and updated the review of systems as necessary    Historical Information    Past Medical History:    COPD, diabetes, hypertension    Family History: non-contributory    Social History     Socioeconomic History    Marital status: Single     Spouse name: None    Number of children: None    Years of education: None    Highest education level: None   Occupational History    None   Social Needs    Financial resource strain: None    Food insecurity:     Worry: None     Inability: None    Transportation needs: Medical: None     Non-medical: None   Tobacco Use    Smoking status: Current Every Day Smoker     Packs/day: 1 00    Smokeless tobacco: Current User   Substance and Sexual Activity    Alcohol use: No    Drug use: No    Sexual activity: None   Lifestyle    Physical activity:     Days per week: None     Minutes per session: None    Stress: None   Relationships    Social connections:     Talks on phone: None     Gets together: None     Attends Worship service: None     Active member of club or organization: None     Attends meetings of clubs or organizations: None     Relationship status: None    Intimate partner violence:     Fear of current or ex partner: None     Emotionally abused: None     Physically abused: None     Forced sexual activity: None   Other Topics Concern    None   Social History Narrative    None         Sleep Schedule: unremarkable    Snoring:   yes    Witnessed Apnea:   no    Medications/Allergies:    Current Outpatient Medications:     amLODIPine (NORVASC) 5 mg tablet, Take 1 tablet (5 mg total) by mouth daily, Disp: 90 tablet, Rfl: 1    atorvastatin (LIPITOR) 40 mg tablet, Take 1 tablet (40 mg total) by mouth daily, Disp: 90 tablet, Rfl: 1    B Complex-C (SUPER B COMPLEX/VITAMIN C PO), Take by mouth, Disp: , Rfl:     Calcium Carbonate-Vitamin D 600-400 MG-UNIT per chew tablet, Chew 1 tablet 2 (two) times a day for 90 days, Disp: 180 tablet, Rfl: 0    cetirizine (ZyrTEC) 10 mg tablet, Take 1 tablet (10 mg total) by mouth daily for 90 days, Disp: 90 tablet, Rfl: 1    Cranberry 450 MG TABS, Take by mouth, Disp: , Rfl:     docusate sodium (COLACE) 100 mg capsule, Take 100 mg by mouth, Disp: , Rfl:     fluticasone-salmeterol (ADVAIR DISKUS) 250-50 mcg/dose inhaler, Inhale 1 puff 2 (two) times a day, Disp: 3 Inhaler, Rfl: 1    gabapentin (NEURONTIN) 300 mg capsule, Take 300 mg by mouth bid , Disp: , Rfl:     losartan (COZAAR) 50 mg tablet, Take 1 tablet (50 mg total) by mouth daily, Disp: 90 tablet, Rfl: 1    metFORMIN (GLUCOPHAGE-XR) 500 mg 24 hr tablet, Take 1 tablet (500 mg total) by mouth daily with dinner, Disp: 90 tablet, Rfl: 1    Multiple Vitamins-Minerals (MULTIVITAMIN ADULT PO), Take by mouth, Disp: , Rfl:     pantoprazole (PROTONIX) 40 mg tablet, Take 1 tablet (40 mg total) by mouth daily, Disp: 90 tablet, Rfl: 1        No notes on file                  Objective:    Vital Signs:   Vitals:    11/26/19 1200   BP: 126/70   Weight: 86 2 kg (190 lb)   Height: 5' 1 5" (1 562 m)     Neck Circumference: 16      [place] Sleepiness Scale: Total score: 9    Physical Exam:    General: Alert, appropriate, cooperative,  overweight    Head: NC/AT,  mild retrognathia    Nose: No septal deviation, nares  partially obstructed, mucosa normal    Throat: Airway diminished, tongue base thickened, no tonsils visualized    Neck: Normal, no thyromegaly or lymphadenopathy, no JVD    Heart: RR, normal S1 and S2, no murmurs    Chest: Clear bilaterally    Extremity: No clubbing, cyanosis,  now edema    Skin: Warm, dry    Neuro: No motor abnormalities, cranial nerves appear intact    Sleep Study Results:   Pending      Counseling / Coordination of Care  A description of the counseling / coordination of care: We discussed the pathophysiology of obstructive sleep apnea as well as the possible treatment options  We also discussed the rationale for positive airway pressure therapy  Board Certified Sleep Specialist    Portions of the record may have been created with voice recognition software  Occasional wrong word or "sound a like" substitutions may have occurred due to the inherent limitations of voice recognition software  Read the chart carefully and recognize, using context, where substitutions have occurred

## 2020-04-08 ENCOUNTER — HOSPITAL ENCOUNTER (OUTPATIENT)
Dept: SLEEP CENTER | Facility: CLINIC | Age: 70
Discharge: HOME | End: 2020-04-08
Payer: COMMERCIAL

## 2020-04-08 DIAGNOSIS — R06.83 SNORING: ICD-10-CM

## 2020-04-08 DIAGNOSIS — R40.0 DAYTIME SLEEPINESS: ICD-10-CM

## 2020-04-08 PROCEDURE — 95810 POLYSOM 6/> YRS 4/> PARAM: CPT

## 2020-04-11 DIAGNOSIS — R09.02 HYPOXIA: ICD-10-CM

## 2020-04-11 DIAGNOSIS — G47.30 SLEEP-RELATED BREATHING DISORDER: Primary | ICD-10-CM

## 2020-04-16 ENCOUNTER — TELEPHONE (OUTPATIENT)
Dept: SLEEP CENTER | Facility: CLINIC | Age: 70
End: 2020-04-16

## 2020-04-21 ENCOUNTER — TELEMEDICINE (OUTPATIENT)
Dept: FAMILY MEDICINE CLINIC | Facility: CLINIC | Age: 70
End: 2020-04-21
Payer: COMMERCIAL

## 2020-04-21 VITALS
BODY MASS INDEX: 34.96 KG/M2 | WEIGHT: 190 LBS | HEIGHT: 62 IN | TEMPERATURE: 97.5 F | SYSTOLIC BLOOD PRESSURE: 133 MMHG | HEART RATE: 107 BPM | DIASTOLIC BLOOD PRESSURE: 76 MMHG

## 2020-04-21 DIAGNOSIS — I10 ESSENTIAL HYPERTENSION: ICD-10-CM

## 2020-04-21 DIAGNOSIS — N18.30 TYPE 2 DIABETES MELLITUS WITH STAGE 3 CHRONIC KIDNEY DISEASE, WITHOUT LONG-TERM CURRENT USE OF INSULIN (HCC): Primary | ICD-10-CM

## 2020-04-21 DIAGNOSIS — G62.9 NEUROPATHY: ICD-10-CM

## 2020-04-21 DIAGNOSIS — E11.40 TYPE 2 DIABETES MELLITUS WITH DIABETIC NEUROPATHY, WITHOUT LONG-TERM CURRENT USE OF INSULIN (HCC): ICD-10-CM

## 2020-04-21 DIAGNOSIS — R10.13 DYSPEPSIA: ICD-10-CM

## 2020-04-21 DIAGNOSIS — E78.5 DYSLIPIDEMIA: ICD-10-CM

## 2020-04-21 DIAGNOSIS — E11.22 TYPE 2 DIABETES MELLITUS WITH STAGE 3 CHRONIC KIDNEY DISEASE, WITHOUT LONG-TERM CURRENT USE OF INSULIN (HCC): Primary | ICD-10-CM

## 2020-04-21 PROCEDURE — 3066F NEPHROPATHY DOC TX: CPT | Performed by: INTERNAL MEDICINE

## 2020-04-21 PROCEDURE — G2012 BRIEF CHECK IN BY MD/QHP: HCPCS | Performed by: INTERNAL MEDICINE

## 2020-04-21 RX ORDER — AMLODIPINE BESYLATE 5 MG/1
5 TABLET ORAL DAILY
Qty: 90 TABLET | Refills: 1 | Status: SHIPPED | OUTPATIENT
Start: 2020-04-21 | End: 2020-07-24 | Stop reason: SDUPTHER

## 2020-04-21 RX ORDER — PANTOPRAZOLE SODIUM 40 MG/1
40 TABLET, DELAYED RELEASE ORAL DAILY
Qty: 90 TABLET | Refills: 1 | Status: SHIPPED | OUTPATIENT
Start: 2020-04-21 | End: 2020-07-24 | Stop reason: SDUPTHER

## 2020-04-21 RX ORDER — LOSARTAN POTASSIUM 50 MG/1
50 TABLET ORAL DAILY
Qty: 90 TABLET | Refills: 1 | Status: SHIPPED | OUTPATIENT
Start: 2020-04-21 | End: 2020-05-19

## 2020-04-21 RX ORDER — ATORVASTATIN CALCIUM 40 MG/1
40 TABLET, FILM COATED ORAL DAILY
Qty: 90 TABLET | Refills: 1 | Status: SHIPPED | OUTPATIENT
Start: 2020-04-21 | End: 2020-07-24 | Stop reason: SDUPTHER

## 2020-04-21 RX ORDER — METFORMIN HYDROCHLORIDE 500 MG/1
500 TABLET, EXTENDED RELEASE ORAL
Qty: 90 TABLET | Refills: 1 | Status: SHIPPED | OUTPATIENT
Start: 2020-04-21 | End: 2020-07-24 | Stop reason: SDUPTHER

## 2020-04-21 RX ORDER — GABAPENTIN 300 MG/1
300 CAPSULE ORAL 2 TIMES DAILY
Qty: 180 CAPSULE | Refills: 1 | Status: SHIPPED | OUTPATIENT
Start: 2020-04-21 | End: 2020-10-17

## 2020-04-22 ENCOUNTER — APPOINTMENT (OUTPATIENT)
Dept: LAB | Facility: MEDICAL CENTER | Age: 70
End: 2020-04-22
Payer: COMMERCIAL

## 2020-04-22 DIAGNOSIS — N18.30 TYPE 2 DIABETES MELLITUS WITH STAGE 3 CHRONIC KIDNEY DISEASE, WITHOUT LONG-TERM CURRENT USE OF INSULIN (HCC): ICD-10-CM

## 2020-04-22 DIAGNOSIS — E78.5 DYSLIPIDEMIA: ICD-10-CM

## 2020-04-22 DIAGNOSIS — E11.22 TYPE 2 DIABETES MELLITUS WITH STAGE 3 CHRONIC KIDNEY DISEASE, WITHOUT LONG-TERM CURRENT USE OF INSULIN (HCC): ICD-10-CM

## 2020-04-22 LAB
ALBUMIN SERPL BCP-MCNC: 3.6 G/DL (ref 3.5–5)
ALP SERPL-CCNC: 63 U/L (ref 46–116)
ALT SERPL W P-5'-P-CCNC: 26 U/L (ref 12–78)
ANION GAP SERPL CALCULATED.3IONS-SCNC: 7 MMOL/L (ref 4–13)
AST SERPL W P-5'-P-CCNC: 18 U/L (ref 5–45)
BILIRUB SERPL-MCNC: 0.63 MG/DL (ref 0.2–1)
BUN SERPL-MCNC: 19 MG/DL (ref 5–25)
CALCIUM SERPL-MCNC: 9.7 MG/DL (ref 8.3–10.1)
CHLORIDE SERPL-SCNC: 106 MMOL/L (ref 100–108)
CHOLEST SERPL-MCNC: 138 MG/DL (ref 50–200)
CO2 SERPL-SCNC: 27 MMOL/L (ref 21–32)
CREAT SERPL-MCNC: 1.17 MG/DL (ref 0.6–1.3)
CREAT UR-MCNC: 179 MG/DL
EST. AVERAGE GLUCOSE BLD GHB EST-MCNC: 143 MG/DL
GFR SERPL CREATININE-BSD FRML MDRD: 48 ML/MIN/1.73SQ M
GLUCOSE P FAST SERPL-MCNC: 123 MG/DL (ref 65–99)
HBA1C MFR BLD: 6.6 %
HDLC SERPL-MCNC: 39 MG/DL
LDLC SERPL CALC-MCNC: 41 MG/DL (ref 0–100)
MICROALBUMIN UR-MCNC: 49.7 MG/L (ref 0–20)
MICROALBUMIN/CREAT 24H UR: 28 MG/G CREATININE (ref 0–30)
NONHDLC SERPL-MCNC: 99 MG/DL
POTASSIUM SERPL-SCNC: 4.2 MMOL/L (ref 3.5–5.3)
PROT SERPL-MCNC: 7.7 G/DL (ref 6.4–8.2)
SODIUM SERPL-SCNC: 140 MMOL/L (ref 136–145)
TRIGL SERPL-MCNC: 291 MG/DL

## 2020-04-22 PROCEDURE — 36415 COLL VENOUS BLD VENIPUNCTURE: CPT

## 2020-04-22 PROCEDURE — 80061 LIPID PANEL: CPT

## 2020-04-22 PROCEDURE — 83036 HEMOGLOBIN GLYCOSYLATED A1C: CPT

## 2020-04-22 PROCEDURE — 82043 UR ALBUMIN QUANTITATIVE: CPT | Performed by: INTERNAL MEDICINE

## 2020-04-22 PROCEDURE — 3060F POS MICROALBUMINURIA REV: CPT | Performed by: INTERNAL MEDICINE

## 2020-04-22 PROCEDURE — 82570 ASSAY OF URINE CREATININE: CPT | Performed by: INTERNAL MEDICINE

## 2020-04-22 PROCEDURE — 80053 COMPREHEN METABOLIC PANEL: CPT

## 2020-05-15 DIAGNOSIS — I10 ESSENTIAL HYPERTENSION: ICD-10-CM

## 2020-05-19 RX ORDER — TELMISARTAN 40 MG/1
40 TABLET ORAL DAILY
Qty: 30 TABLET | Refills: 1 | Status: SHIPPED | OUTPATIENT
Start: 2020-05-19 | End: 2020-07-14

## 2020-07-14 DIAGNOSIS — I10 ESSENTIAL HYPERTENSION: ICD-10-CM

## 2020-07-14 RX ORDER — TELMISARTAN 40 MG/1
TABLET ORAL
Qty: 30 TABLET | Refills: 1 | Status: SHIPPED | OUTPATIENT
Start: 2020-07-14 | End: 2020-07-24 | Stop reason: SDUPTHER

## 2020-07-24 ENCOUNTER — OFFICE VISIT (OUTPATIENT)
Dept: FAMILY MEDICINE CLINIC | Facility: CLINIC | Age: 70
End: 2020-07-24
Payer: COMMERCIAL

## 2020-07-24 ENCOUNTER — APPOINTMENT (OUTPATIENT)
Dept: LAB | Facility: MEDICAL CENTER | Age: 70
End: 2020-07-24
Payer: COMMERCIAL

## 2020-07-24 VITALS
TEMPERATURE: 97.1 F | OXYGEN SATURATION: 92 % | SYSTOLIC BLOOD PRESSURE: 118 MMHG | DIASTOLIC BLOOD PRESSURE: 70 MMHG | WEIGHT: 185 LBS | BODY MASS INDEX: 34.04 KG/M2 | RESPIRATION RATE: 20 BRPM | HEART RATE: 116 BPM | HEIGHT: 62 IN

## 2020-07-24 DIAGNOSIS — N18.30 TYPE 2 DIABETES MELLITUS WITH STAGE 3 CHRONIC KIDNEY DISEASE, WITHOUT LONG-TERM CURRENT USE OF INSULIN (HCC): ICD-10-CM

## 2020-07-24 DIAGNOSIS — Z12.31 OTHER SCREENING MAMMOGRAM: ICD-10-CM

## 2020-07-24 DIAGNOSIS — E78.5 DYSLIPIDEMIA: ICD-10-CM

## 2020-07-24 DIAGNOSIS — I10 ESSENTIAL HYPERTENSION: ICD-10-CM

## 2020-07-24 DIAGNOSIS — J44.9 CHRONIC OBSTRUCTIVE PULMONARY DISEASE, UNSPECIFIED COPD TYPE (HCC): ICD-10-CM

## 2020-07-24 DIAGNOSIS — E11.40 TYPE 2 DIABETES MELLITUS WITH DIABETIC NEUROPATHY, WITHOUT LONG-TERM CURRENT USE OF INSULIN (HCC): ICD-10-CM

## 2020-07-24 DIAGNOSIS — E11.22 TYPE 2 DIABETES MELLITUS WITH STAGE 3 CHRONIC KIDNEY DISEASE, WITHOUT LONG-TERM CURRENT USE OF INSULIN (HCC): ICD-10-CM

## 2020-07-24 DIAGNOSIS — R10.13 DYSPEPSIA: ICD-10-CM

## 2020-07-24 DIAGNOSIS — Z00.00 MEDICARE ANNUAL WELLNESS VISIT, SUBSEQUENT: Primary | ICD-10-CM

## 2020-07-24 LAB
ALBUMIN SERPL BCP-MCNC: 3.5 G/DL (ref 3.5–5)
ALP SERPL-CCNC: 51 U/L (ref 46–116)
ALT SERPL W P-5'-P-CCNC: 26 U/L (ref 12–78)
ANION GAP SERPL CALCULATED.3IONS-SCNC: 5 MMOL/L (ref 4–13)
AST SERPL W P-5'-P-CCNC: 19 U/L (ref 5–45)
BILIRUB SERPL-MCNC: 0.64 MG/DL (ref 0.2–1)
BUN SERPL-MCNC: 17 MG/DL (ref 5–25)
CALCIUM SERPL-MCNC: 8.9 MG/DL (ref 8.3–10.1)
CHLORIDE SERPL-SCNC: 105 MMOL/L (ref 100–108)
CO2 SERPL-SCNC: 30 MMOL/L (ref 21–32)
CREAT SERPL-MCNC: 1.21 MG/DL (ref 0.6–1.3)
GFR SERPL CREATININE-BSD FRML MDRD: 46 ML/MIN/1.73SQ M
GLUCOSE P FAST SERPL-MCNC: 130 MG/DL (ref 65–99)
POTASSIUM SERPL-SCNC: 4.2 MMOL/L (ref 3.5–5.3)
PROT SERPL-MCNC: 7.2 G/DL (ref 6.4–8.2)
SL AMB POCT HEMOGLOBIN AIC: 6.5 (ref ?–6.5)
SODIUM SERPL-SCNC: 140 MMOL/L (ref 136–145)

## 2020-07-24 PROCEDURE — 83036 HEMOGLOBIN GLYCOSYLATED A1C: CPT | Performed by: INTERNAL MEDICINE

## 2020-07-24 PROCEDURE — 1160F RVW MEDS BY RX/DR IN RCRD: CPT | Performed by: INTERNAL MEDICINE

## 2020-07-24 PROCEDURE — 80053 COMPREHEN METABOLIC PANEL: CPT

## 2020-07-24 PROCEDURE — 3044F HG A1C LEVEL LT 7.0%: CPT | Performed by: INTERNAL MEDICINE

## 2020-07-24 PROCEDURE — 3078F DIAST BP <80 MM HG: CPT | Performed by: INTERNAL MEDICINE

## 2020-07-24 PROCEDURE — 3008F BODY MASS INDEX DOCD: CPT | Performed by: INTERNAL MEDICINE

## 2020-07-24 PROCEDURE — 36415 COLL VENOUS BLD VENIPUNCTURE: CPT

## 2020-07-24 PROCEDURE — 99214 OFFICE O/P EST MOD 30 MIN: CPT | Performed by: INTERNAL MEDICINE

## 2020-07-24 PROCEDURE — 4040F PNEUMOC VAC/ADMIN/RCVD: CPT | Performed by: INTERNAL MEDICINE

## 2020-07-24 PROCEDURE — 1125F AMNT PAIN NOTED PAIN PRSNT: CPT | Performed by: INTERNAL MEDICINE

## 2020-07-24 PROCEDURE — G0439 PPPS, SUBSEQ VISIT: HCPCS | Performed by: INTERNAL MEDICINE

## 2020-07-24 PROCEDURE — 3060F POS MICROALBUMINURIA REV: CPT | Performed by: INTERNAL MEDICINE

## 2020-07-24 PROCEDURE — 1170F FXNL STATUS ASSESSED: CPT | Performed by: INTERNAL MEDICINE

## 2020-07-24 PROCEDURE — 4010F ACE/ARB THERAPY RXD/TAKEN: CPT | Performed by: INTERNAL MEDICINE

## 2020-07-24 PROCEDURE — 3066F NEPHROPATHY DOC TX: CPT | Performed by: INTERNAL MEDICINE

## 2020-07-24 PROCEDURE — 3074F SYST BP LT 130 MM HG: CPT | Performed by: INTERNAL MEDICINE

## 2020-07-24 RX ORDER — METFORMIN HYDROCHLORIDE 500 MG/1
500 TABLET, EXTENDED RELEASE ORAL
Qty: 90 TABLET | Refills: 1 | Status: SHIPPED | OUTPATIENT
Start: 2020-07-24 | End: 2021-01-22

## 2020-07-24 RX ORDER — ATORVASTATIN CALCIUM 40 MG/1
40 TABLET, FILM COATED ORAL DAILY
Qty: 90 TABLET | Refills: 1 | Status: SHIPPED | OUTPATIENT
Start: 2020-07-24 | End: 2021-01-22

## 2020-07-24 RX ORDER — TELMISARTAN 40 MG/1
40 TABLET ORAL DAILY
Qty: 90 TABLET | Refills: 1 | Status: SHIPPED | OUTPATIENT
Start: 2020-07-24 | End: 2021-01-21 | Stop reason: SDUPTHER

## 2020-07-24 RX ORDER — AMLODIPINE BESYLATE 5 MG/1
5 TABLET ORAL DAILY
Qty: 90 TABLET | Refills: 1 | Status: SHIPPED | OUTPATIENT
Start: 2020-07-24 | End: 2021-01-22

## 2020-07-24 RX ORDER — PANTOPRAZOLE SODIUM 40 MG/1
40 TABLET, DELAYED RELEASE ORAL DAILY
Qty: 90 TABLET | Refills: 1 | Status: SHIPPED | OUTPATIENT
Start: 2020-07-24 | End: 2021-01-21 | Stop reason: SDUPTHER

## 2020-07-24 NOTE — PROGRESS NOTES
Assessment/Plan:         Diagnoses and all orders for this visit:    Medicare annual wellness visit, subsequent    Other screening mammogram  -     Mammo screening bilateral w cad; Future    Type 2 diabetes mellitus with diabetic neuropathy, without long-term current use of insulin (HCC)  -     POCT hemoglobin A1c  -     Comprehensive metabolic panel; Future    Type 2 diabetes mellitus with stage 3 chronic kidney disease, without long-term current use of insulin ([facility])  Comments:  a1c 6 5  Orders:  -     metFORMIN (GLUCOPHAGE-XR) 500 mg 24 hr tablet; Take 1 tablet (500 mg total) by mouth daily with dinner    Chronic obstructive pulmonary disease, unspecified COPD type ([facility] )  Comments:  sees dr barton  on hs oxygen    Essential hypertension  -     telmisartan (MICARDIS) 40 mg tablet; Take 1 tablet (40 mg total) by mouth daily  -     amLODIPine (NORVASC) 5 mg tablet; Take 1 tablet (5 mg total) by mouth daily    Essential hypertension  Comments:  reorder med   Orders:  -     telmisartan (MICARDIS) 40 mg tablet; Take 1 tablet (40 mg total) by mouth daily  -     amLODIPine (NORVASC) 5 mg tablet; Take 1 tablet (5 mg total) by mouth daily    Dyslipidemia  Comments:  on statin  Orders:  -     atorvastatin (LIPITOR) 40 mg tablet; Take 1 tablet (40 mg total) by mouth daily    Type 2 diabetes mellitus with stage 3 chronic kidney disease, without long-term current use of insulin ([facility])  Comments:  rto 3 months  she is aware her prediabetes a1c in creased to type 2 dm  Orders:  -     metFORMIN (GLUCOPHAGE-XR) 500 mg 24 hr tablet; Take 1 tablet (500 mg total) by mouth daily with dinner    Dyspepsia  Comments:  on ppi  Orders:  -     pantoprazole (PROTONIX) 40 mg tablet; Take 1 tablet (40 mg total) by mouth daily          Subjective:      Patient ID: Sundar Garza is a 81 y o  female  On arb and statin    Denies retinopathy +ckd-3 and +neuropathy on gabapentine      The following portions of the patient's history were reviewed and updated as appropriate: She  has a past medical history of Asthma, Back pain, COPD (chronic obstructive pulmonary disease) ([place] [address] ), COPD (chronic obstructive pulmonary disease) ([place] [address] ), DVT (deep venous thrombosis) ([place] [address] ), GERD (gastroesophageal reflux disease), Hyperlipidemia, and Hypertension  She   Patient Active Problem List    Diagnosis Date Noted    Type 2 diabetes mellitus with stage 3 chronic kidney disease, without long-term current use of insulin ([place] [address] ) 2020    Type 2 diabetes mellitus with diabetic neuropathy, without long-term current use of insulin ([place] [address] ) 2020    Snoring     Daytime sleepiness     Hyperlipidemia 2019    COPD (chronic obstructive pulmonary disease) ([place] [address] ) 2019    Essential hypertension 2019     She  has a past surgical history that includes  section; Colonoscopy; Hysterectomy; Appendectomy; Esophagogastroduodenoscopy; Breast biopsy; and Breast cyst excision (Left, 2010)  Her family history includes Colon cancer in her mother; Esophageal cancer in her father; No Known Problems in her daughter, maternal grandfather, maternal grandmother, paternal grandfather, paternal grandmother, and sister  She  reports that she has been smoking  She has been smoking about 1 00 pack per day  She uses smokeless tobacco  She reports that she does not drink alcohol or use drugs    Current Outpatient Medications   Medication Sig Dispense Refill    amLODIPine (NORVASC) 5 mg tablet Take 1 tablet (5 mg total) by mouth daily 90 tablet 1    atorvastatin (LIPITOR) 40 mg tablet Take 1 tablet (40 mg total) by mouth daily 90 tablet 1    B Complex-C (SUPER B COMPLEX/VITAMIN C PO) Take by mouth      Calcium Carbonate-Vitamin D 600-400 MG-UNIT per chew tablet Chew 1 tablet 2 (two) times a day for 90 days 180 tablet 0    cetirizine (ZyrTEC) 10 mg tablet Take 1 tablet (10 mg total) by mouth daily for 90 days 90 tablet 1    Cranberry 450 MG TABS Take by mouth      docusate sodium (COLACE) 100 mg capsule Take 100 mg by mouth      fluticasone-salmeterol (ADVAIR DISKUS) 250-50 mcg/dose inhaler Inhale 1 puff 2 (two) times a day 3 Inhaler 1    gabapentin (NEURONTIN) 300 mg capsule Take 1 capsule (300 mg total) by mouth 2 (two) times a day bid 180 capsule 1    metFORMIN (GLUCOPHAGE-XR) 500 mg 24 hr tablet Take 1 tablet (500 mg total) by mouth daily with dinner 90 tablet 1    Multiple Vitamins-Minerals (MULTIVITAMIN ADULT PO) Take by mouth      pantoprazole (PROTONIX) 40 mg tablet Take 1 tablet (40 mg total) by mouth daily 90 tablet 1    telmisartan (MICARDIS) 40 mg tablet Take 1 tablet (40 mg total) by mouth daily 90 tablet 1     No current facility-administered medications for this visit  Current Outpatient Medications on File Prior to Visit   Medication Sig    B Complex-C (SUPER B COMPLEX/VITAMIN C PO) Take by mouth    Calcium Carbonate-Vitamin D 600-400 MG-UNIT per chew tablet Chew 1 tablet 2 (two) times a day for 90 days    cetirizine (ZyrTEC) 10 mg tablet Take 1 tablet (10 mg total) by mouth daily for 90 days    Cranberry 450 MG TABS Take by mouth    docusate sodium (COLACE) 100 mg capsule Take 100 mg by mouth    fluticasone-salmeterol (ADVAIR DISKUS) 250-50 mcg/dose inhaler Inhale 1 puff 2 (two) times a day    gabapentin (NEURONTIN) 300 mg capsule Take 1 capsule (300 mg total) by mouth 2 (two) times a day bid    Multiple Vitamins-Minerals (MULTIVITAMIN ADULT PO) Take by mouth     No current facility-administered medications on file prior to visit  She is allergic to sulfa antibiotics       Review of Systems   Constitutional: Negative  Negative for fever  HENT: Negative  Eyes: Negative for visual disturbance  Respiratory: Negative for cough and shortness of breath  Cardiovascular: Negative  Neurological: Positive for numbness           Objective:      /70   Pulse (!) 116   Temp (!) 97 1 °F (36 2 °C)   Resp 20   Ht 5' 1 5" (1 562 m)   Wt 83 9 kg (185 lb)   SpO2 92%   BMI 34 39 kg/m²          Physical Exam   Constitutional: She appears well-developed and well-nourished  No distress  HENT:   Head: Normocephalic and atraumatic  Right Ear: External ear normal    Left Ear: External ear normal    Nose: Nose normal    Mouth/Throat: Oropharynx is clear and moist  No oropharyngeal exudate  Neck: Normal range of motion  Neck supple  No thyromegaly present  Cardiovascular: Normal rate, regular rhythm, normal heart sounds and intact distal pulses  Exam reveals no gallop and no friction rub  No murmur heard  Pulses:       Dorsalis pedis pulses are 2+ on the right side, and 2+ on the left side  Pulmonary/Chest: Effort normal and breath sounds normal  No stridor  No respiratory distress  She has no wheezes  She has no rales  Feet:   Right Foot:   Skin Integrity: Positive for warmth  Negative for erythema  Left Foot:   Skin Integrity: Positive for warmth  Negative for erythema  Lymphadenopathy:     She has no cervical adenopathy  Skin: She is not diaphoretic  Diabetic Foot Exam    Patient's shoes and socks removed  Right Foot/Ankle   Right Foot Inspection  Skin Exam: warmth no erythema                            Sensory   Vibration: intact    Monofilament testing: intact  Vascular    The right DP pulse is 2+  Left Foot/Ankle  Left Foot Inspection  Skin Exam: warmthno erythema                                         Sensory   Vibration: intact    Monofilament: intact  Vascular    The left DP pulse is 2+  Assign Risk Category:  No deformity present;  No loss of protective sensation;        Risk: 0

## 2020-07-24 NOTE — PROGRESS NOTES
Assessment and Plan:     Problem List Items Addressed This Visit        Endocrine    Type 2 diabetes mellitus with diabetic neuropathy, without long-term current use of insulin ([address] )    Relevant Orders    POCT hemoglobin A1c      Other Visit Diagnoses     Other screening mammogram    -  Primary    Relevant Orders    Mammo screening bilateral w cad           Preventive health issues were discussed with patient, and age appropriate screening tests were ordered as noted in patient's After Visit Summary  Personalized health advice and appropriate referrals for health education or preventive services given if needed, as noted in patient's After Visit Summary       History of Present Illness:     Patient presents for Medicare Annual Wellness visit    Patient Care Team:  Danielle Tejeda DO as PCP - General (Internal Medicine)     Problem List:     Patient Active Problem List   Diagnosis    Essential hypertension    Hyperlipidemia    COPD (chronic obstructive pulmonary disease) ([address] )    Snoring    Daytime sleepiness    Type 2 diabetes mellitus with diabetic neuropathy, without long-term current use of insulin ([address] )      Past Medical and Surgical History:     Past Medical History:   Diagnosis Date    Asthma     Back pain     COPD (chronic obstructive pulmonary disease) ([address] )     COPD (chronic obstructive pulmonary disease) ([address] )     DVT (deep venous thrombosis) ([address] )     GERD (gastroesophageal reflux disease)     Hyperlipidemia     Hypertension      Past Surgical History:   Procedure Laterality Date    APPENDECTOMY      BREAST BIOPSY      BREAST CYST EXCISION Left 2010     SECTION      COLONOSCOPY      ESOPHAGOGASTRODUODENOSCOPY      HYSTERECTOMY        Family History:     Family History   Problem Relation Age of Onset    Colon cancer Mother     Esophageal cancer Father     No Known Problems Sister     No Known Problems Daughter     No Known Problems Maternal Grandmother     No Known Problems Maternal Grandfather     No Known Problems Paternal Grandmother     No Known Problems Paternal Grandfather       Social History:        Social History     Socioeconomic History    Marital status: Single     Spouse name: None    Number of children: None    Years of education: None    Highest education level: None   Occupational History    None   Social Needs    Financial resource strain: None    Food insecurity:     Worry: None     Inability: None    Transportation needs:     Medical: None     Non-medical: None   Tobacco Use    Smoking status: Current Every Day Smoker     Packs/day: 1 00    Smokeless tobacco: Current User   Substance and Sexual Activity    Alcohol use: No    Drug use: No    Sexual activity: None   Lifestyle    Physical activity:     Days per week: None     Minutes per session: None    Stress: None   Relationships    Social connections:     Talks on phone: None     Gets together: None     Attends Mandaen service: None     Active member of club or organization: None     Attends meetings of clubs or organizations: None     Relationship status: None    Intimate partner violence:     Fear of current or ex partner: None     Emotionally abused: None     Physically abused: None     Forced sexual activity: None   Other Topics Concern    None   Social History Narrative    None      Medications and Allergies:     Current Outpatient Medications   Medication Sig Dispense Refill    amLODIPine (NORVASC) 5 mg tablet Take 1 tablet (5 mg total) by mouth daily 90 tablet 1    atorvastatin (LIPITOR) 40 mg tablet Take 1 tablet (40 mg total) by mouth daily 90 tablet 1    B Complex-C (SUPER B COMPLEX/VITAMIN C PO) Take by mouth      Calcium Carbonate-Vitamin D 600-400 MG-UNIT per chew tablet Chew 1 tablet 2 (two) times a day for 90 days 180 tablet 0    cetirizine (ZyrTEC) 10 mg tablet Take 1 tablet (10 mg total) by mouth daily for 90 days 90 tablet 1    Cranberry 450 MG TABS Take by mouth      docusate sodium (COLACE) 100 mg capsule Take 100 mg by mouth      fluticasone-salmeterol (ADVAIR DISKUS) 250-50 mcg/dose inhaler Inhale 1 puff 2 (two) times a day 3 Inhaler 1    gabapentin (NEURONTIN) 300 mg capsule Take 1 capsule (300 mg total) by mouth 2 (two) times a day bid 180 capsule 1    metFORMIN (GLUCOPHAGE-XR) 500 mg 24 hr tablet Take 1 tablet (500 mg total) by mouth daily with dinner 90 tablet 1    Multiple Vitamins-Minerals (MULTIVITAMIN ADULT PO) Take by mouth      pantoprazole (PROTONIX) 40 mg tablet Take 1 tablet (40 mg total) by mouth daily 90 tablet 1    telmisartan (MICARDIS) 40 mg tablet TAKE 1 TABLET BY MOUTH EVERY DAY 30 tablet 1     No current facility-administered medications for this visit  Allergies   Allergen Reactions    Sulfa Antibiotics Rash      Immunizations:     Immunization History   Administered Date(s) Administered    INFLUENZA 11/19/2018    Influenza Split High Dose Preservative Free IM 11/19/2018    Influenza, high dose seasonal 0 5 mL 10/15/2019    Pneumococcal Conjugate 13-Valent 04/11/2019      Health Maintenance:         Topic Date Due    Hepatitis C Screening  1950    MAMMOGRAM  05/22/2020    CRC Screening: Colonoscopy  06/28/2026         Topic Date Due    Pneumococcal Vaccine: 65+ Years (2 of 2 - PPSV23) 04/11/2020    Influenza Vaccine  07/01/2020      Medicare Health Risk Assessment:     /72   Pulse (!) 116   Temp (!) 97 1 °F (36 2 °C)   Resp 20   Ht 5' 1 5" (1 562 m)   Wt 83 9 kg (185 lb)   SpO2 92%   BMI 34 39 kg/m²      Priti Dasilva is here for her Subsequent Wellness visit  Last Medicare Wellness visit information reviewed, patient interviewed and updates made to the record today  Health Risk Assessment:   Patient rates overall health as good  Patient feels that their physical health rating is same  Eyesight was rated as same  Hearing was rated as same   Patient feels that their emotional and mental health rating is same  Pain experienced in the last 7 days has been none  Patient states that she has experienced no weight loss or gain in last 6 months  Depression Screening:   PHQ-2 Score: 0      Fall Risk Screening: In the past year, patient has experienced: no history of falling in past year      Urinary Incontinence Screening:   Patient has not leaked urine accidently in the last six months  Home Safety:  Patient does not have trouble with stairs inside or outside of their home  Patient has working smoke alarms and has working carbon monoxide detector  Home safety hazards include: none  Nutrition:   Current diet is Diabetic  Medications:   Patient is currently taking over-the-counter supplements  OTC medications include: see medication list  Patient is able to manage medications  Activities of Daily Living (ADLs)/Instrumental Activities of Daily Living (IADLs):   Walk and transfer into and out of bed and chair?: Yes  Dress and groom yourself?: Yes    Bathe or shower yourself?: Yes    Feed yourself?  Yes  Do your laundry/housekeeping?: Yes  Manage your money, pay your bills and track your expenses?: Yes  Make your own meals?: Yes    Do your own shopping?: Yes    Previous Hospitalizations:   Any hospitalizations or ED visits within the last 12 months?: No      Advance Care Planning:   Living will: No    Durable POA for healthcare: No    Advanced directive: No      Comments: Durable poa ie partner and son    Cognitive Screening:   Provider or family/friend/caregiver concerned regarding cognition?: No    PREVENTIVE SCREENINGS      Cardiovascular Screening:    General: Screening Not Indicated and History Lipid Disorder      Diabetes Screening:     General: Screening Not Indicated and History Diabetes      Colorectal Cancer Screening:     General: Screening Current      Breast Cancer Screening:     General: Screening Current      Cervical Cancer Screening:    General: Screening Not Indicated      Lung Cancer Screening:     General: Screening Not Indicated    Other Counseling Topics:   Calcium and vitamin D intake and regular weightbearing exercise         Sara May DO

## 2020-10-17 DIAGNOSIS — G62.9 NEUROPATHY: ICD-10-CM

## 2020-10-17 RX ORDER — GABAPENTIN 300 MG/1
CAPSULE ORAL
Qty: 180 CAPSULE | Refills: 1 | Status: SHIPPED | OUTPATIENT
Start: 2020-10-17 | End: 2021-01-21 | Stop reason: SDUPTHER

## 2020-10-27 ENCOUNTER — OFFICE VISIT (OUTPATIENT)
Dept: SLEEP CENTER | Facility: CLINIC | Age: 70
End: 2020-10-27
Payer: COMMERCIAL

## 2020-10-27 VITALS
HEIGHT: 62 IN | TEMPERATURE: 96.6 F | WEIGHT: 181.8 LBS | BODY MASS INDEX: 33.45 KG/M2 | SYSTOLIC BLOOD PRESSURE: 132 MMHG | DIASTOLIC BLOOD PRESSURE: 60 MMHG

## 2020-10-27 DIAGNOSIS — R09.02 HYPOXEMIA: Primary | ICD-10-CM

## 2020-10-27 PROCEDURE — 99213 OFFICE O/P EST LOW 20 MIN: CPT | Performed by: INTERNAL MEDICINE

## 2020-10-27 PROCEDURE — 1160F RVW MEDS BY RX/DR IN RCRD: CPT | Performed by: INTERNAL MEDICINE

## 2020-11-05 ENCOUNTER — OFFICE VISIT (OUTPATIENT)
Dept: FAMILY MEDICINE CLINIC | Facility: CLINIC | Age: 70
End: 2020-11-05
Payer: COMMERCIAL

## 2020-11-05 ENCOUNTER — APPOINTMENT (OUTPATIENT)
Dept: LAB | Facility: MEDICAL CENTER | Age: 70
End: 2020-11-05
Payer: COMMERCIAL

## 2020-11-05 VITALS
OXYGEN SATURATION: 92 % | BODY MASS INDEX: 33.31 KG/M2 | TEMPERATURE: 96.2 F | HEIGHT: 62 IN | DIASTOLIC BLOOD PRESSURE: 80 MMHG | WEIGHT: 181 LBS | SYSTOLIC BLOOD PRESSURE: 138 MMHG | RESPIRATION RATE: 16 BRPM | HEART RATE: 103 BPM

## 2020-11-05 DIAGNOSIS — I10 ESSENTIAL HYPERTENSION: ICD-10-CM

## 2020-11-05 DIAGNOSIS — R53.83 FATIGUE, UNSPECIFIED TYPE: ICD-10-CM

## 2020-11-05 DIAGNOSIS — E11.22 TYPE 2 DIABETES MELLITUS WITH STAGE 3 CHRONIC KIDNEY DISEASE, WITHOUT LONG-TERM CURRENT USE OF INSULIN, UNSPECIFIED WHETHER STAGE 3A OR 3B CKD (HCC): Primary | ICD-10-CM

## 2020-11-05 DIAGNOSIS — N18.30 TYPE 2 DIABETES MELLITUS WITH STAGE 3 CHRONIC KIDNEY DISEASE, WITHOUT LONG-TERM CURRENT USE OF INSULIN, UNSPECIFIED WHETHER STAGE 3A OR 3B CKD (HCC): Primary | ICD-10-CM

## 2020-11-05 DIAGNOSIS — E78.5 HYPERLIPIDEMIA, UNSPECIFIED HYPERLIPIDEMIA TYPE: ICD-10-CM

## 2020-11-05 DIAGNOSIS — E11.40 TYPE 2 DIABETES MELLITUS WITH DIABETIC NEUROPATHY, WITHOUT LONG-TERM CURRENT USE OF INSULIN (HCC): ICD-10-CM

## 2020-11-05 DIAGNOSIS — J44.9 CHRONIC OBSTRUCTIVE PULMONARY DISEASE, UNSPECIFIED COPD TYPE (HCC): ICD-10-CM

## 2020-11-05 DIAGNOSIS — Z23 ENCOUNTER FOR IMMUNIZATION: ICD-10-CM

## 2020-11-05 LAB
ALBUMIN SERPL BCP-MCNC: 3.6 G/DL (ref 3.5–5)
ALP SERPL-CCNC: 63 U/L (ref 46–116)
ALT SERPL W P-5'-P-CCNC: 26 U/L (ref 12–78)
ANION GAP SERPL CALCULATED.3IONS-SCNC: 3 MMOL/L (ref 4–13)
AST SERPL W P-5'-P-CCNC: 26 U/L (ref 5–45)
BILIRUB SERPL-MCNC: 0.58 MG/DL (ref 0.2–1)
BUN SERPL-MCNC: 17 MG/DL (ref 5–25)
CALCIUM SERPL-MCNC: 9.9 MG/DL (ref 8.3–10.1)
CHLORIDE SERPL-SCNC: 106 MMOL/L (ref 100–108)
CHOLEST SERPL-MCNC: 131 MG/DL (ref 50–200)
CO2 SERPL-SCNC: 30 MMOL/L (ref 21–32)
CREAT SERPL-MCNC: 1.11 MG/DL (ref 0.6–1.3)
ERYTHROCYTE [DISTWIDTH] IN BLOOD BY AUTOMATED COUNT: 12.9 % (ref 11.6–15.1)
GFR SERPL CREATININE-BSD FRML MDRD: 52 ML/MIN/1.73SQ M
GLUCOSE P FAST SERPL-MCNC: 114 MG/DL (ref 65–99)
HCT VFR BLD AUTO: 44.4 % (ref 34.8–46.1)
HDLC SERPL-MCNC: 43 MG/DL
HGB BLD-MCNC: 14.4 G/DL (ref 11.5–15.4)
LDLC SERPL CALC-MCNC: 41 MG/DL (ref 0–100)
MCH RBC QN AUTO: 30.6 PG (ref 26.8–34.3)
MCHC RBC AUTO-ENTMCNC: 32.4 G/DL (ref 31.4–37.4)
MCV RBC AUTO: 95 FL (ref 82–98)
NONHDLC SERPL-MCNC: 88 MG/DL
PLATELET # BLD AUTO: 184 THOUSANDS/UL (ref 149–390)
PMV BLD AUTO: 11.4 FL (ref 8.9–12.7)
POTASSIUM SERPL-SCNC: 4.6 MMOL/L (ref 3.5–5.3)
PROT SERPL-MCNC: 7.4 G/DL (ref 6.4–8.2)
RBC # BLD AUTO: 4.7 MILLION/UL (ref 3.81–5.12)
SL AMB POCT HEMOGLOBIN AIC: 6.3 (ref ?–6.5)
SODIUM SERPL-SCNC: 139 MMOL/L (ref 136–145)
TRIGL SERPL-MCNC: 233 MG/DL
WBC # BLD AUTO: 6.06 THOUSAND/UL (ref 4.31–10.16)

## 2020-11-05 PROCEDURE — 3079F DIAST BP 80-89 MM HG: CPT | Performed by: INTERNAL MEDICINE

## 2020-11-05 PROCEDURE — 4004F PT TOBACCO SCREEN RCVD TLK: CPT | Performed by: INTERNAL MEDICINE

## 2020-11-05 PROCEDURE — 3066F NEPHROPATHY DOC TX: CPT | Performed by: INTERNAL MEDICINE

## 2020-11-05 PROCEDURE — 3008F BODY MASS INDEX DOCD: CPT | Performed by: INTERNAL MEDICINE

## 2020-11-05 PROCEDURE — 3044F HG A1C LEVEL LT 7.0%: CPT | Performed by: INTERNAL MEDICINE

## 2020-11-05 PROCEDURE — 3725F SCREEN DEPRESSION PERFORMED: CPT | Performed by: INTERNAL MEDICINE

## 2020-11-05 PROCEDURE — 3075F SYST BP GE 130 - 139MM HG: CPT | Performed by: INTERNAL MEDICINE

## 2020-11-05 PROCEDURE — 80061 LIPID PANEL: CPT

## 2020-11-05 PROCEDURE — 36415 COLL VENOUS BLD VENIPUNCTURE: CPT

## 2020-11-05 PROCEDURE — 90732 PPSV23 VACC 2 YRS+ SUBQ/IM: CPT

## 2020-11-05 PROCEDURE — 99214 OFFICE O/P EST MOD 30 MIN: CPT | Performed by: INTERNAL MEDICINE

## 2020-11-05 PROCEDURE — 80053 COMPREHEN METABOLIC PANEL: CPT

## 2020-11-05 PROCEDURE — 1160F RVW MEDS BY RX/DR IN RCRD: CPT | Performed by: INTERNAL MEDICINE

## 2020-11-05 PROCEDURE — G0009 ADMIN PNEUMOCOCCAL VACCINE: HCPCS

## 2020-11-05 PROCEDURE — 83036 HEMOGLOBIN GLYCOSYLATED A1C: CPT | Performed by: INTERNAL MEDICINE

## 2020-11-05 PROCEDURE — 4040F PNEUMOC VAC/ADMIN/RCVD: CPT | Performed by: INTERNAL MEDICINE

## 2020-11-05 PROCEDURE — 85027 COMPLETE CBC AUTOMATED: CPT

## 2020-11-05 RX ORDER — ALBUTEROL SULFATE 2.5 MG/3ML
2.5 SOLUTION RESPIRATORY (INHALATION) EVERY 4 HOURS PRN
Qty: 60 VIAL | Refills: 5 | Status: SHIPPED | OUTPATIENT
Start: 2020-11-05

## 2020-11-12 ENCOUNTER — VBI (OUTPATIENT)
Dept: ADMINISTRATIVE | Facility: OTHER | Age: 70
End: 2020-11-12

## 2020-11-17 ENCOUNTER — HOSPITAL ENCOUNTER (OUTPATIENT)
Dept: RADIOLOGY | Facility: MEDICAL CENTER | Age: 70
Discharge: HOME | End: 2020-11-17
Payer: COMMERCIAL

## 2020-11-17 VITALS — WEIGHT: 181 LBS | BODY MASS INDEX: 33.31 KG/M2 | HEIGHT: 62 IN

## 2020-11-17 DIAGNOSIS — Z12.31 OTHER SCREENING MAMMOGRAM: ICD-10-CM

## 2020-11-17 PROCEDURE — 77067 SCR MAMMO BI INCL CAD: CPT

## 2020-11-17 PROCEDURE — 77063 BREAST TOMOSYNTHESIS BI: CPT

## 2020-12-10 ENCOUNTER — TELEPHONE (OUTPATIENT)
Dept: FAMILY MEDICINE CLINIC | Facility: CLINIC | Age: 70
End: 2020-12-10

## 2020-12-18 ENCOUNTER — TELEMEDICINE (OUTPATIENT)
Dept: FAMILY MEDICINE CLINIC | Facility: CLINIC | Age: 70
End: 2020-12-18
Payer: COMMERCIAL

## 2020-12-18 DIAGNOSIS — U07.1 LAB TEST POSITIVE FOR DETECTION OF COVID-19 VIRUS: Primary | ICD-10-CM

## 2020-12-18 PROCEDURE — 99441 PR PHYS/QHP TELEPHONE EVALUATION 5-10 MIN: CPT | Performed by: INTERNAL MEDICINE

## 2021-01-21 DIAGNOSIS — N18.30 TYPE 2 DIABETES MELLITUS WITH STAGE 3 CHRONIC KIDNEY DISEASE, WITHOUT LONG-TERM CURRENT USE OF INSULIN (HCC): ICD-10-CM

## 2021-01-21 DIAGNOSIS — I10 ESSENTIAL HYPERTENSION: ICD-10-CM

## 2021-01-21 DIAGNOSIS — R10.13 DYSPEPSIA: ICD-10-CM

## 2021-01-21 DIAGNOSIS — E11.22 TYPE 2 DIABETES MELLITUS WITH STAGE 3 CHRONIC KIDNEY DISEASE, WITHOUT LONG-TERM CURRENT USE OF INSULIN (HCC): ICD-10-CM

## 2021-01-21 DIAGNOSIS — G62.9 NEUROPATHY: ICD-10-CM

## 2021-01-21 DIAGNOSIS — E78.5 DYSLIPIDEMIA: ICD-10-CM

## 2021-01-22 RX ORDER — METFORMIN HYDROCHLORIDE 500 MG/1
TABLET, EXTENDED RELEASE ORAL
Qty: 90 TABLET | Refills: 1 | Status: SHIPPED | OUTPATIENT
Start: 2021-01-22 | End: 2021-05-17 | Stop reason: SDUPTHER

## 2021-01-22 RX ORDER — PANTOPRAZOLE SODIUM 40 MG/1
40 TABLET, DELAYED RELEASE ORAL DAILY
Qty: 90 TABLET | Refills: 1 | Status: SHIPPED | OUTPATIENT
Start: 2021-01-22 | End: 2021-05-17 | Stop reason: SDUPTHER

## 2021-01-22 RX ORDER — TELMISARTAN 40 MG/1
40 TABLET ORAL DAILY
Qty: 90 TABLET | Refills: 1 | Status: SHIPPED | OUTPATIENT
Start: 2021-01-22 | End: 2021-05-17 | Stop reason: SDUPTHER

## 2021-01-22 RX ORDER — GABAPENTIN 300 MG/1
300 CAPSULE ORAL 2 TIMES DAILY
Qty: 180 CAPSULE | Refills: 1 | Status: SHIPPED | OUTPATIENT
Start: 2021-01-22 | End: 2021-11-17 | Stop reason: SDUPTHER

## 2021-01-22 RX ORDER — AMLODIPINE BESYLATE 5 MG/1
TABLET ORAL
Qty: 90 TABLET | Refills: 1 | Status: SHIPPED | OUTPATIENT
Start: 2021-01-22 | End: 2021-05-17 | Stop reason: SDUPTHER

## 2021-01-22 RX ORDER — ATORVASTATIN CALCIUM 40 MG/1
TABLET, FILM COATED ORAL
Qty: 90 TABLET | Refills: 1 | Status: SHIPPED | OUTPATIENT
Start: 2021-01-22 | End: 2021-05-17 | Stop reason: SDUPTHER

## 2021-04-30 ENCOUNTER — VBI (OUTPATIENT)
Dept: ADMINISTRATIVE | Facility: OTHER | Age: 71
End: 2021-04-30

## 2021-05-05 ENCOUNTER — TELEPHONE (OUTPATIENT)
Dept: FAMILY MEDICINE CLINIC | Facility: CLINIC | Age: 71
End: 2021-05-05

## 2021-05-05 NOTE — TELEPHONE ENCOUNTER
Patient scheduled for routine follow up 5/17 and asked about lab work to be done beforehand   Please advise    I'll call her to let her know eitherway

## 2021-05-06 DIAGNOSIS — E11.40 TYPE 2 DIABETES MELLITUS WITH DIABETIC NEUROPATHY, WITHOUT LONG-TERM CURRENT USE OF INSULIN (HCC): Primary | ICD-10-CM

## 2021-05-06 DIAGNOSIS — E78.5 HYPERLIPIDEMIA, UNSPECIFIED HYPERLIPIDEMIA TYPE: ICD-10-CM

## 2021-05-11 ENCOUNTER — RA CDI HCC (OUTPATIENT)
Dept: OTHER | Facility: HOSPITAL | Age: 71
End: 2021-05-11

## 2021-05-11 NOTE — PROGRESS NOTES
[place] [id number]  coding opportunities        DX used   Chart reviewed, (number of) suggestions sent to provider: 3           Patients insurance company: [address]  (Medicare Advantage and [employer])     Visit status: Patient arrived for their scheduled appointment     Provider never responded to [place] [id number]  coding request     [place] [address]  coding opportunities    DX: E11 22 Type 2 diabetes mellitus with diabetic chronic kidney disease  DX: N18 31 Chronic kidney disease, stage 3a  DX: J44 9 Chronic obstructive pulmonary disease, unspecified             Chart reviewed, (number of) suggestions sent to provider: 3           Patients insurance company: [address]  (Medicare Advantage and Commercial)

## 2021-05-13 LAB
LEFT EYE DIABETIC RETINOPATHY: NORMAL
RIGHT EYE DIABETIC RETINOPATHY: NORMAL

## 2021-05-17 ENCOUNTER — OFFICE VISIT (OUTPATIENT)
Dept: FAMILY MEDICINE CLINIC | Facility: CLINIC | Age: 71
End: 2021-05-17
Payer: COMMERCIAL

## 2021-05-17 VITALS
HEIGHT: 62 IN | WEIGHT: 178 LBS | HEART RATE: 70 BPM | SYSTOLIC BLOOD PRESSURE: 122 MMHG | TEMPERATURE: 97.6 F | BODY MASS INDEX: 32.76 KG/M2 | OXYGEN SATURATION: 93 % | RESPIRATION RATE: 18 BRPM | DIASTOLIC BLOOD PRESSURE: 68 MMHG

## 2021-05-17 DIAGNOSIS — R10.13 DYSPEPSIA: ICD-10-CM

## 2021-05-17 DIAGNOSIS — N18.30 TYPE 2 DIABETES MELLITUS WITH STAGE 3 CHRONIC KIDNEY DISEASE, WITHOUT LONG-TERM CURRENT USE OF INSULIN (HCC): ICD-10-CM

## 2021-05-17 DIAGNOSIS — E11.22 TYPE 2 DIABETES MELLITUS WITH STAGE 3 CHRONIC KIDNEY DISEASE, WITHOUT LONG-TERM CURRENT USE OF INSULIN (HCC): ICD-10-CM

## 2021-05-17 DIAGNOSIS — J44.9 CHRONIC OBSTRUCTIVE PULMONARY DISEASE, UNSPECIFIED COPD TYPE (HCC): ICD-10-CM

## 2021-05-17 DIAGNOSIS — I10 ESSENTIAL HYPERTENSION: ICD-10-CM

## 2021-05-17 DIAGNOSIS — E78.5 DYSLIPIDEMIA: ICD-10-CM

## 2021-05-17 DIAGNOSIS — E11.40 TYPE 2 DIABETES MELLITUS WITH DIABETIC NEUROPATHY, WITHOUT LONG-TERM CURRENT USE OF INSULIN (HCC): Primary | ICD-10-CM

## 2021-05-17 LAB — SL AMB POCT HEMOGLOBIN AIC: 6.3 (ref ?–6.5)

## 2021-05-17 PROCEDURE — 3078F DIAST BP <80 MM HG: CPT | Performed by: INTERNAL MEDICINE

## 2021-05-17 PROCEDURE — 4004F PT TOBACCO SCREEN RCVD TLK: CPT | Performed by: INTERNAL MEDICINE

## 2021-05-17 PROCEDURE — 3288F FALL RISK ASSESSMENT DOCD: CPT | Performed by: INTERNAL MEDICINE

## 2021-05-17 PROCEDURE — 3725F SCREEN DEPRESSION PERFORMED: CPT | Performed by: INTERNAL MEDICINE

## 2021-05-17 PROCEDURE — 3074F SYST BP LT 130 MM HG: CPT | Performed by: INTERNAL MEDICINE

## 2021-05-17 PROCEDURE — 3066F NEPHROPATHY DOC TX: CPT | Performed by: INTERNAL MEDICINE

## 2021-05-17 PROCEDURE — 4010F ACE/ARB THERAPY RXD/TAKEN: CPT | Performed by: INTERNAL MEDICINE

## 2021-05-17 PROCEDURE — 1160F RVW MEDS BY RX/DR IN RCRD: CPT | Performed by: INTERNAL MEDICINE

## 2021-05-17 PROCEDURE — 99214 OFFICE O/P EST MOD 30 MIN: CPT | Performed by: INTERNAL MEDICINE

## 2021-05-17 PROCEDURE — 3008F BODY MASS INDEX DOCD: CPT | Performed by: INTERNAL MEDICINE

## 2021-05-17 PROCEDURE — 83036 HEMOGLOBIN GLYCOSYLATED A1C: CPT | Performed by: INTERNAL MEDICINE

## 2021-05-17 PROCEDURE — 1101F PT FALLS ASSESS-DOCD LE1/YR: CPT | Performed by: INTERNAL MEDICINE

## 2021-05-17 RX ORDER — PANTOPRAZOLE SODIUM 40 MG/1
40 TABLET, DELAYED RELEASE ORAL DAILY
Qty: 90 TABLET | Refills: 1 | Status: SHIPPED | OUTPATIENT
Start: 2021-05-17 | End: 2021-11-17 | Stop reason: SDUPTHER

## 2021-05-17 RX ORDER — ATORVASTATIN CALCIUM 40 MG/1
40 TABLET, FILM COATED ORAL DAILY
Qty: 90 TABLET | Refills: 1 | Status: SHIPPED | OUTPATIENT
Start: 2021-05-17 | End: 2021-11-17 | Stop reason: SDUPTHER

## 2021-05-17 RX ORDER — AMLODIPINE BESYLATE 5 MG/1
5 TABLET ORAL DAILY
Qty: 90 TABLET | Refills: 1 | Status: SHIPPED | OUTPATIENT
Start: 2021-05-17 | End: 2021-11-17 | Stop reason: SDUPTHER

## 2021-05-17 RX ORDER — METFORMIN HYDROCHLORIDE 500 MG/1
500 TABLET, EXTENDED RELEASE ORAL
Qty: 90 TABLET | Refills: 1 | Status: SHIPPED | OUTPATIENT
Start: 2021-05-17 | End: 2021-11-17 | Stop reason: SDUPTHER

## 2021-05-17 RX ORDER — TELMISARTAN 40 MG/1
40 TABLET ORAL DAILY
Qty: 90 TABLET | Refills: 1 | Status: SHIPPED | OUTPATIENT
Start: 2021-05-17 | End: 2021-11-17 | Stop reason: SDUPTHER

## 2021-05-17 NOTE — PROGRESS NOTES
BMI Counseling: Body mass index is 32 56 kg/m²  The BMI is above normal  Nutrition recommendations include decreasing portion sizes and limiting drinks that contain sugar  Exercise recommendations include exercising 3-5 times per week  No pharmacotherapy was ordered  Patient referred to PCP due to patient being overweight  Assessment/Plan:         Diagnoses and all orders for this visit:    Type 2 diabetes mellitus with diabetic neuropathy, without long-term current use of insulin ([facility])  Comments:  gabapentine/  a1c at goal 6 3  Orders:  -     POCT hemoglobin A1c  -     Microalbumin / creatinine urine ratio  -     Comprehensive metabolic panel; Future    Essential hypertension  Comments:  reorder med / on arb  Orders:  -     telmisartan (MICARDIS) 40 mg tablet; Take 1 tablet (40 mg total) by mouth daily  -     amLODIPine (NORVASC) 5 mg tablet; Take 1 tablet (5 mg total) by mouth daily    Dyslipidemia  Comments:  on statin  Orders:  -     atorvastatin (LIPITOR) 40 mg tablet; Take 1 tablet (40 mg total) by mouth daily  -     Lipid panel; Future    Type 2 diabetes mellitus with stage 3 chronic kidney disease, without long-term current use of insulin ([facility])  Comments:  rto 3 months  she is aware her prediabetes a1c in creased to type 2 dm  Orders:  -     metFORMIN (GLUCOPHAGE-XR) 500 mg 24 hr tablet; Take 1 tablet (500 mg total) by mouth daily with dinner    Dyspepsia  Comments:  on ppi  Orders:  -     pantoprazole (PROTONIX) 40 mg tablet; Take 1 tablet (40 mg total) by mouth daily    Chronic obstructive pulmonary disease, unspecified COPD type ([facility])          Subjective:      Patient ID: Wen Curtis is a 78 y o  female  Pt needs rx  Relates she has not check glucose x about 1 year  She is out of strips and lancets  She states had covid but barely any s/s   +ckd -3 +neuropathy denies retinopathy  On arb and statin  Needs rx    Due for lab      The following portions of the patient's history were reviewed and updated as appropriate: She  has a past medical history of Asthma, Back pain, COPD (chronic obstructive pulmonary disease) ([place] [address] ), COPD (chronic obstructive pulmonary disease) ([place] [address] ), DVT (deep venous thrombosis) ([place] [address] ), GERD (gastroesophageal reflux disease), Hyperlipidemia, and Hypertension  She   Patient Active Problem List    Diagnosis Date Noted    Type 2 diabetes mellitus with stage 3 chronic kidney disease, without long-term current use of insulin ([place] [address] ) 2020    Type 2 diabetes mellitus with diabetic neuropathy, without long-term current use of insulin ([place] [address] ) 2020    Snoring     Daytime sleepiness     Hyperlipidemia 2019    COPD (chronic obstructive pulmonary disease) ([place] [address] ) 2019    Essential hypertension 2019     She  has a past surgical history that includes  section; Colonoscopy; Appendectomy; Esophagogastroduodenoscopy; Breast biopsy; Breast cyst excision (Left, 2010); Hysterectomy; and Oophorectomy (Bilateral)  Her family history includes Colon cancer (age of onset: 43) in her mother; Esophageal cancer (age of onset: 64) in her father; No Known Problems in her daughter, maternal grandfather, maternal grandmother, paternal grandfather, paternal grandmother, and son; Skin cancer in her sister  She  reports that she has been smoking  She has been smoking about 1 00 pack per day  She uses smokeless tobacco  She reports that she does not drink alcohol or use drugs    Current Outpatient Medications   Medication Sig Dispense Refill    albuterol (2 5 mg/3 mL) 0 083 % nebulizer solution Take 1 vial (2 5 mg total) by nebulization every 4 (four) hours as needed for wheezing or shortness of breath 60 vial 5    amLODIPine (NORVASC) 5 mg tablet Take 1 tablet (5 mg total) by mouth daily 90 tablet 1    atorvastatin (LIPITOR) 40 mg tablet Take 1 tablet (40 mg total) by mouth daily 90 tablet 1    B Complex-C (SUPER B COMPLEX/VITAMIN C PO) Take by mouth      Cranberry 450 MG TABS Take by mouth      docusate sodium (COLACE) 100 mg capsule Take 100 mg by mouth      fluticasone-salmeterol (ADVAIR DISKUS) 250-50 mcg/dose inhaler Inhale 1 puff 2 (two) times a day 3 Inhaler 1    gabapentin (NEURONTIN) 300 mg capsule Take 1 capsule (300 mg total) by mouth 2 (two) times a day 180 capsule 1    metFORMIN (GLUCOPHAGE-XR) 500 mg 24 hr tablet Take 1 tablet (500 mg total) by mouth daily with dinner 90 tablet 1    Multiple Vitamins-Minerals (MULTIVITAMIN ADULT PO) Take by mouth      pantoprazole (PROTONIX) 40 mg tablet Take 1 tablet (40 mg total) by mouth daily 90 tablet 1    telmisartan (MICARDIS) 40 mg tablet Take 1 tablet (40 mg total) by mouth daily 90 tablet 1    Calcium Carbonate-Vitamin D 600-400 MG-UNIT per chew tablet Chew 1 tablet 2 (two) times a day for 90 days 180 tablet 0    cetirizine (ZyrTEC) 10 mg tablet Take 1 tablet (10 mg total) by mouth daily for 90 days 90 tablet 1     No current facility-administered medications for this visit        Current Outpatient Medications on File Prior to Visit   Medication Sig    albuterol (2 5 mg/3 mL) 0 083 % nebulizer solution Take 1 vial (2 5 mg total) by nebulization every 4 (four) hours as needed for wheezing or shortness of breath    B Complex-C (SUPER B COMPLEX/VITAMIN C PO) Take by mouth    Cranberry 450 MG TABS Take by mouth    docusate sodium (COLACE) 100 mg capsule Take 100 mg by mouth    fluticasone-salmeterol (ADVAIR DISKUS) 250-50 mcg/dose inhaler Inhale 1 puff 2 (two) times a day    gabapentin (NEURONTIN) 300 mg capsule Take 1 capsule (300 mg total) by mouth 2 (two) times a day    Multiple Vitamins-Minerals (MULTIVITAMIN ADULT PO) Take by mouth    Calcium Carbonate-Vitamin D 600-400 MG-UNIT per chew tablet Chew 1 tablet 2 (two) times a day for 90 days    cetirizine (ZyrTEC) 10 mg tablet Take 1 tablet (10 mg total) by mouth daily for 90 days     No current facility-administered medications on file prior to visit  She is allergic to sulfa antibiotics       Review of Systems   Constitutional: Negative  Negative for chills and fever  HENT: Negative  Eyes: Negative for visual disturbance  Respiratory: Negative  Cardiovascular: Negative  Endocrine: Negative for polydipsia, polyphagia and polyuria  Genitourinary: Negative  Neurological: Positive for numbness  Objective:      /68   Pulse 70   Temp 97 6 °F (36 4 °C)   Resp 18   Ht 5' 2" (1 575 m)   Wt 80 7 kg (178 lb)   SpO2 93%   BMI 32 56 kg/m²          Physical Exam  Constitutional:       Appearance: She is obese  HENT:      Head: Normocephalic and atraumatic  Right Ear: Tympanic membrane, ear canal and external ear normal       Left Ear: Tympanic membrane, ear canal and external ear normal    Neck:      Musculoskeletal: Neck supple  Cardiovascular:      Rate and Rhythm: Normal rate and regular rhythm  Pulses:           Dorsalis pedis pulses are 2+ on the right side and 2+ on the left side  Pulmonary:      Effort: Pulmonary effort is normal       Breath sounds: Normal breath sounds  Feet:      Right foot:      Skin integrity: Warmth present  No erythema  Left foot:      Skin integrity: Warmth present  No erythema  Lymphadenopathy:      Cervical: No cervical adenopathy  Neurological:      Mental Status: She is alert  Diabetic Foot Exam    Patient's shoes and socks removed  Right Foot/Ankle   Right Foot Inspection  Skin Exam: warmth no erythema                            Sensory   Vibration: intact    Monofilament testing: intact  Vascular    The right DP pulse is 2+  Left Foot/Ankle  Left Foot Inspection  Skin Exam: warmthno erythema                                         Sensory   Vibration: intact    Monofilament: intact  Vascular    The left DP pulse is 2+  Assign Risk Category:  No deformity present;  No loss of protective sensation;        Risk: 0

## 2021-06-16 ENCOUNTER — APPOINTMENT (OUTPATIENT)
Dept: LAB | Facility: MEDICAL CENTER | Age: 71
End: 2021-06-16
Payer: COMMERCIAL

## 2021-06-16 DIAGNOSIS — E11.40 TYPE 2 DIABETES MELLITUS WITH DIABETIC NEUROPATHY, WITHOUT LONG-TERM CURRENT USE OF INSULIN (HCC): ICD-10-CM

## 2021-06-16 DIAGNOSIS — E78.5 DYSLIPIDEMIA: ICD-10-CM

## 2021-06-16 DIAGNOSIS — E78.5 HYPERLIPIDEMIA, UNSPECIFIED HYPERLIPIDEMIA TYPE: ICD-10-CM

## 2021-06-16 LAB
ALBUMIN SERPL BCP-MCNC: 3.7 G/DL (ref 3.5–5)
ALP SERPL-CCNC: 65 U/L (ref 46–116)
ALT SERPL W P-5'-P-CCNC: 20 U/L (ref 12–78)
ANION GAP SERPL CALCULATED.3IONS-SCNC: 8 MMOL/L (ref 4–13)
AST SERPL W P-5'-P-CCNC: 19 U/L (ref 5–45)
BILIRUB SERPL-MCNC: 0.62 MG/DL (ref 0.2–1)
BUN SERPL-MCNC: 17 MG/DL (ref 5–25)
CALCIUM SERPL-MCNC: 9.4 MG/DL (ref 8.3–10.1)
CHLORIDE SERPL-SCNC: 105 MMOL/L (ref 100–108)
CHOLEST SERPL-MCNC: 127 MG/DL (ref 50–200)
CO2 SERPL-SCNC: 28 MMOL/L (ref 21–32)
CREAT SERPL-MCNC: 1.08 MG/DL (ref 0.6–1.3)
CREAT UR-MCNC: 427 MG/DL
EST. AVERAGE GLUCOSE BLD GHB EST-MCNC: 131 MG/DL
GFR SERPL CREATININE-BSD FRML MDRD: 53 ML/MIN/1.73SQ M
GLUCOSE P FAST SERPL-MCNC: 122 MG/DL (ref 65–99)
HBA1C MFR BLD: 6.2 %
HDLC SERPL-MCNC: 41 MG/DL
LDLC SERPL CALC-MCNC: 21 MG/DL (ref 0–100)
MICROALBUMIN UR-MCNC: 253 MG/L (ref 0–20)
MICROALBUMIN/CREAT 24H UR: 59 MG/G CREATININE (ref 0–30)
NONHDLC SERPL-MCNC: 86 MG/DL
POTASSIUM SERPL-SCNC: 3.8 MMOL/L (ref 3.5–5.3)
PROT SERPL-MCNC: 7.9 G/DL (ref 6.4–8.2)
SODIUM SERPL-SCNC: 141 MMOL/L (ref 136–145)
TRIGL SERPL-MCNC: 323 MG/DL

## 2021-06-16 PROCEDURE — 83036 HEMOGLOBIN GLYCOSYLATED A1C: CPT

## 2021-06-16 PROCEDURE — 36415 COLL VENOUS BLD VENIPUNCTURE: CPT

## 2021-06-16 PROCEDURE — 80053 COMPREHEN METABOLIC PANEL: CPT

## 2021-06-16 PROCEDURE — 3044F HG A1C LEVEL LT 7.0%: CPT | Performed by: INTERNAL MEDICINE

## 2021-06-16 PROCEDURE — 82570 ASSAY OF URINE CREATININE: CPT | Performed by: INTERNAL MEDICINE

## 2021-06-16 PROCEDURE — 82043 UR ALBUMIN QUANTITATIVE: CPT | Performed by: INTERNAL MEDICINE

## 2021-06-16 PROCEDURE — 3060F POS MICROALBUMINURIA REV: CPT | Performed by: INTERNAL MEDICINE

## 2021-06-16 PROCEDURE — 80061 LIPID PANEL: CPT

## 2021-08-17 ENCOUNTER — OFFICE VISIT (OUTPATIENT)
Dept: FAMILY MEDICINE CLINIC | Facility: CLINIC | Age: 71
End: 2021-08-17
Payer: COMMERCIAL

## 2021-08-17 VITALS
HEART RATE: 100 BPM | DIASTOLIC BLOOD PRESSURE: 74 MMHG | SYSTOLIC BLOOD PRESSURE: 142 MMHG | WEIGHT: 178 LBS | RESPIRATION RATE: 16 BRPM | TEMPERATURE: 97.7 F | BODY MASS INDEX: 32.76 KG/M2 | OXYGEN SATURATION: 93 % | HEIGHT: 62 IN

## 2021-08-17 DIAGNOSIS — E78.5 HYPERLIPIDEMIA, UNSPECIFIED HYPERLIPIDEMIA TYPE: ICD-10-CM

## 2021-08-17 DIAGNOSIS — Z12.31 ENCOUNTER FOR SCREENING MAMMOGRAM FOR BREAST CANCER: ICD-10-CM

## 2021-08-17 DIAGNOSIS — E11.40 TYPE 2 DIABETES MELLITUS WITH DIABETIC NEUROPATHY, WITHOUT LONG-TERM CURRENT USE OF INSULIN (HCC): ICD-10-CM

## 2021-08-17 DIAGNOSIS — E11.22 TYPE 2 DIABETES MELLITUS WITH STAGE 3 CHRONIC KIDNEY DISEASE, WITHOUT LONG-TERM CURRENT USE OF INSULIN, UNSPECIFIED WHETHER STAGE 3A OR 3B CKD (HCC): ICD-10-CM

## 2021-08-17 DIAGNOSIS — I10 ESSENTIAL HYPERTENSION: ICD-10-CM

## 2021-08-17 DIAGNOSIS — Z00.00 MEDICARE ANNUAL WELLNESS VISIT, SUBSEQUENT: Primary | ICD-10-CM

## 2021-08-17 DIAGNOSIS — J44.9 CHRONIC OBSTRUCTIVE PULMONARY DISEASE, UNSPECIFIED COPD TYPE (HCC): ICD-10-CM

## 2021-08-17 DIAGNOSIS — N18.30 TYPE 2 DIABETES MELLITUS WITH STAGE 3 CHRONIC KIDNEY DISEASE, WITHOUT LONG-TERM CURRENT USE OF INSULIN, UNSPECIFIED WHETHER STAGE 3A OR 3B CKD (HCC): ICD-10-CM

## 2021-08-17 PROCEDURE — 3288F FALL RISK ASSESSMENT DOCD: CPT | Performed by: INTERNAL MEDICINE

## 2021-08-17 PROCEDURE — 1160F RVW MEDS BY RX/DR IN RCRD: CPT | Performed by: INTERNAL MEDICINE

## 2021-08-17 PROCEDURE — 99214 OFFICE O/P EST MOD 30 MIN: CPT | Performed by: INTERNAL MEDICINE

## 2021-08-17 PROCEDURE — 3066F NEPHROPATHY DOC TX: CPT | Performed by: INTERNAL MEDICINE

## 2021-08-17 PROCEDURE — 3008F BODY MASS INDEX DOCD: CPT | Performed by: INTERNAL MEDICINE

## 2021-08-17 PROCEDURE — 3725F SCREEN DEPRESSION PERFORMED: CPT | Performed by: INTERNAL MEDICINE

## 2021-08-17 PROCEDURE — G0439 PPPS, SUBSEQ VISIT: HCPCS | Performed by: INTERNAL MEDICINE

## 2021-08-17 PROCEDURE — 4004F PT TOBACCO SCREEN RCVD TLK: CPT | Performed by: INTERNAL MEDICINE

## 2021-08-17 PROCEDURE — 3078F DIAST BP <80 MM HG: CPT | Performed by: INTERNAL MEDICINE

## 2021-08-17 PROCEDURE — 1170F FXNL STATUS ASSESSED: CPT | Performed by: INTERNAL MEDICINE

## 2021-08-17 PROCEDURE — 1125F AMNT PAIN NOTED PAIN PRSNT: CPT | Performed by: INTERNAL MEDICINE

## 2021-08-17 PROCEDURE — 3077F SYST BP >= 140 MM HG: CPT | Performed by: INTERNAL MEDICINE

## 2021-08-17 NOTE — PROGRESS NOTES
Assessment/Plan:         Diagnoses and all orders for this visit:    Medicare annual wellness visit, subsequent    Encounter for screening mammogram for breast cancer  -     Mammo screening bilateral w 3d & cad; Future    Type 2 diabetes mellitus with diabetic neuropathy, without long-term current use of insulin ([place] [address] )  -     Comprehensive metabolic panel; Future  -     Hemoglobin A1C; Future    Type 2 diabetes mellitus with stage 3 chronic kidney disease, without long-term current use of insulin, unspecified whether stage 3a or 3b CKD ([place] [address] )  -     Comprehensive metabolic panel; Future  -     Hemoglobin A1C; Future    Chronic obstructive pulmonary disease, unspecified COPD type ([place] [address] )    Essential hypertension    Hyperlipidemia, unspecified hyperlipidemia type          Subjective:      Patient ID: Jessica Jara is a 78 y o  female  On arb and statin  a1c 6 2   +ckd-3 and neuropathy  Denies retinopathy denies cp/slo or h/a  The following portions of the patient's history were reviewed and updated as appropriate: She  has a past medical history of Asthma, Back pain, COPD (chronic obstructive pulmonary disease) ([place] [address] ), COPD (chronic obstructive pulmonary disease) ([place] [address] ), DVT (deep venous thrombosis) ([place] [address] ), GERD (gastroesophageal reflux disease), Hyperlipidemia, and Hypertension  She   Patient Active Problem List    Diagnosis Date Noted    Type 2 diabetes mellitus with stage 3 chronic kidney disease, without long-term current use of insulin ([place] [address] ) 2020    Type 2 diabetes mellitus with diabetic neuropathy, without long-term current use of insulin ([place] [address] ) 2020    Snoring     Daytime sleepiness     Hyperlipidemia 2019    COPD (chronic obstructive pulmonary disease) ([place] [address] ) 2019    Essential hypertension 2019     She  has a past surgical history that includes  section; Colonoscopy;  Appendectomy; Esophagogastroduodenoscopy; Breast biopsy; Breast cyst excision (Left, 2010); Hysterectomy; and Oophorectomy (Bilateral)  Her family history includes Colon cancer (age of onset: 43) in her mother; Esophageal cancer (age of onset: 64) in her father; No Known Problems in her daughter, maternal grandfather, maternal grandmother, paternal grandfather, paternal grandmother, and son; Skin cancer in her sister  She  reports that she has been smoking  She has been smoking about 1 00 pack per day  She has never used smokeless tobacco  She reports that she does not drink alcohol and does not use drugs    Current Outpatient Medications   Medication Sig Dispense Refill    albuterol (2 5 mg/3 mL) 0 083 % nebulizer solution Take 1 vial (2 5 mg total) by nebulization every 4 (four) hours as needed for wheezing or shortness of breath 60 vial 5    amLODIPine (NORVASC) 5 mg tablet Take 1 tablet (5 mg total) by mouth daily 90 tablet 1    atorvastatin (LIPITOR) 40 mg tablet Take 1 tablet (40 mg total) by mouth daily 90 tablet 1    B Complex-C (SUPER B COMPLEX/VITAMIN C PO) Take by mouth      Calcium Carbonate-Vitamin D 600-400 MG-UNIT per chew tablet Chew 1 tablet 2 (two) times a day for 90 days 180 tablet 0    cetirizine (ZyrTEC) 10 mg tablet Take 1 tablet (10 mg total) by mouth daily for 90 days 90 tablet 1    Cranberry 450 MG TABS Take by mouth      docusate sodium (COLACE) 100 mg capsule Take 100 mg by mouth      fluticasone-salmeterol (ADVAIR DISKUS) 250-50 mcg/dose inhaler Inhale 1 puff 2 (two) times a day 3 Inhaler 1    gabapentin (NEURONTIN) 300 mg capsule Take 1 capsule (300 mg total) by mouth 2 (two) times a day 180 capsule 1    metFORMIN (GLUCOPHAGE-XR) 500 mg 24 hr tablet Take 1 tablet (500 mg total) by mouth daily with dinner 90 tablet 1    Multiple Vitamins-Minerals (MULTIVITAMIN ADULT PO) Take by mouth      pantoprazole (PROTONIX) 40 mg tablet Take 1 tablet (40 mg total) by mouth daily 90 tablet 1    telmisartan (MICARDIS) 40 mg tablet Take 1 tablet (40 mg total) by mouth daily 90 tablet 1     No current facility-administered medications for this visit  Current Outpatient Medications on File Prior to Visit   Medication Sig    albuterol (2 5 mg/3 mL) 0 083 % nebulizer solution Take 1 vial (2 5 mg total) by nebulization every 4 (four) hours as needed for wheezing or shortness of breath    amLODIPine (NORVASC) 5 mg tablet Take 1 tablet (5 mg total) by mouth daily    atorvastatin (LIPITOR) 40 mg tablet Take 1 tablet (40 mg total) by mouth daily    B Complex-C (SUPER B COMPLEX/VITAMIN C PO) Take by mouth    Calcium Carbonate-Vitamin D 600-400 MG-UNIT per chew tablet Chew 1 tablet 2 (two) times a day for 90 days    cetirizine (ZyrTEC) 10 mg tablet Take 1 tablet (10 mg total) by mouth daily for 90 days    Cranberry 450 MG TABS Take by mouth    docusate sodium (COLACE) 100 mg capsule Take 100 mg by mouth    fluticasone-salmeterol (ADVAIR DISKUS) 250-50 mcg/dose inhaler Inhale 1 puff 2 (two) times a day    gabapentin (NEURONTIN) 300 mg capsule Take 1 capsule (300 mg total) by mouth 2 (two) times a day    metFORMIN (GLUCOPHAGE-XR) 500 mg 24 hr tablet Take 1 tablet (500 mg total) by mouth daily with dinner    Multiple Vitamins-Minerals (MULTIVITAMIN ADULT PO) Take by mouth    pantoprazole (PROTONIX) 40 mg tablet Take 1 tablet (40 mg total) by mouth daily    telmisartan (MICARDIS) 40 mg tablet Take 1 tablet (40 mg total) by mouth daily     No current facility-administered medications on file prior to visit  She is allergic to sulfa antibiotics       Review of Systems   Constitutional: Negative  Negative for chills and fever  HENT: Negative  Respiratory: Negative  Cardiovascular: Negative  Gastrointestinal: Negative            Objective:      /74 (BP Location: Right arm, Patient Position: Sitting, Cuff Size: Standard)   Pulse 100   Temp 97 7 °F (36 5 °C) (Temporal)   Resp 16   Ht 5' 1 5" (1 562 m)   Wt 80 7 kg (178 lb)   SpO2 93%   BMI 33 09 kg/m² Physical Exam  Constitutional:       Appearance: She is obese  HENT:      Head: Normocephalic and atraumatic  Right Ear: Tympanic membrane and ear canal normal       Left Ear: Tympanic membrane and ear canal normal    Cardiovascular:      Rate and Rhythm: Normal rate and regular rhythm  Pulmonary:      Effort: Pulmonary effort is normal       Breath sounds: Normal breath sounds  Musculoskeletal:      Cervical back: Neck supple  Lymphadenopathy:      Cervical: No cervical adenopathy  Neurological:      Mental Status: She is alert

## 2021-08-17 NOTE — PATIENT INSTRUCTIONS
Medicare Preventive Visit Patient Instructions  Thank you for completing your Welcome to Medicare Visit or Medicare Annual Wellness Visit today  Your next wellness visit will be due in one year (8/18/2022)  The screening/preventive services that you may require over the next 5-10 years are detailed below  Some tests may not apply to you based off risk factors and/or age  Screening tests ordered at today's visit but not completed yet may show as past due  Also, please note that scanned in results may not display below  Preventive Screenings:  Service Recommendations Previous Testing/Comments   Colorectal Cancer Screening  * Colonoscopy    * Fecal Occult Blood Test (FOBT)/Fecal Immunochemical Test (FIT)  * Fecal DNA/Cologuard Test  * Flexible Sigmoidoscopy Age: 54-65 years old   Colonoscopy: every 10 years (may be performed more frequently if at higher risk)  OR  FOBT/FIT: every 1 year  OR  Cologuard: every 3 years  OR  Sigmoidoscopy: every 5 years  Screening may be recommended earlier than age 48 if at higher risk for colorectal cancer  Also, an individualized decision between you and your healthcare provider will decide whether screening between the ages of 74-80 would be appropriate  Colonoscopy: 06/28/2016  FOBT/FIT: Not on file  Cologuard: Not on file  Sigmoidoscopy: Not on file    Screening Current     Breast Cancer Screening Age: 36 years old  Frequency: every 1-2 years  Not required if history of left and right mastectomy Mammogram: 11/17/2020    Screening Current   Cervical Cancer Screening Between the ages of 21-29, pap smear recommended once every 3 years  Between the ages of 33-67, can perform pap smear with HPV co-testing every 5 years     Recommendations may differ for women with a history of total hysterectomy, cervical cancer, or abnormal pap smears in past  Pap Smear: Not on file    Screening Not Indicated   Hepatitis C Screening Once for adults born between 1945 and 1965  More frequently in patients at high risk for Hepatitis C Hep C Antibody: Not on file        Diabetes Screening 1-2 times per year if you're at risk for diabetes or have pre-diabetes Fasting glucose: 122 mg/dL   A1C: 6 2 %    Screening Not Indicated  History Diabetes   Cholesterol Screening Once every 5 years if you don't have a lipid disorder  May order more often based on risk factors  Lipid panel: 06/16/2021    Screening Not Indicated  History Lipid Disorder     Other Preventive Screenings Covered by Medicare:  1  Abdominal Aortic Aneurysm (AAA) Screening: covered once if your at risk  You're considered to be at risk if you have a family history of AAA  2  Lung Cancer Screening: covers low dose CT scan once per year if you meet all of the following conditions: (1) Age 50-69; (2) No signs or symptoms of lung cancer; (3) Current smoker or have quit smoking within the last 15 years; (4) You have a tobacco smoking history of at least 30 pack years (packs per day multiplied by number of years you smoked); (5) You get a written order from a healthcare provider  3  Glaucoma Screening: covered annually if you're considered high risk: (1) You have diabetes OR (2) Family history of glaucoma OR (3)  aged 48 and older OR (3)  American aged 72 and older  3  Osteoporosis Screening: covered every 2 years if you meet one of the following conditions: (1) You're estrogen deficient and at risk for osteoporosis based off medical history and other findings; (2) Have a vertebral abnormality; (3) On glucocorticoid therapy for more than 3 months; (4) Have primary hyperparathyroidism; (5) On osteoporosis medications and need to assess response to drug therapy  · Last bone density test (DXA Scan): 05/22/2019   5  HIV Screening: covered annually if you're between the age of 15-65  Also covered annually if you are younger than 13 and older than 72 with risk factors for HIV infection   For pregnant patients, it is covered up to 3 times per pregnancy  Immunizations:  Immunization Recommendations   Influenza Vaccine Annual influenza vaccination during flu season is recommended for all persons aged >= 6 months who do not have contraindications   Pneumococcal Vaccine (Prevnar and Pneumovax)  * Prevnar = PCV13  * Pneumovax = PPSV23   Adults 25-60 years old: 1-3 doses may be recommended based on certain risk factors  Adults 72 years old: Prevnar (PCV13) vaccine recommended followed by Pneumovax (PPSV23) vaccine  If already received PPSV23 since turning 65, then PCV13 recommended at least one year after PPSV23 dose  Hepatitis B Vaccine 3 dose series if at intermediate or high risk (ex: diabetes, end stage renal disease, liver disease)   Tetanus (Td) Vaccine - COST NOT COVERED BY MEDICARE PART B Following completion of primary series, a booster dose should be given every 10 years to maintain immunity against tetanus  Td may also be given as tetanus wound prophylaxis  Tdap Vaccine - COST NOT COVERED BY MEDICARE PART B Recommended at least once for all adults  For pregnant patients, recommended with each pregnancy  Shingles Vaccine (Shingrix) - COST NOT COVERED BY MEDICARE PART B  2 shot series recommended in those aged 48 and above     Health Maintenance Due:      Topic Date Due    Hepatitis C Screening  Never done    Breast Cancer Screening: Mammogram  11/17/2021    Colorectal Cancer Screening  06/28/2026     Immunizations Due:      Topic Date Due    Influenza Vaccine (1) 09/01/2021     Advance Directives   What are advance directives? Advance directives are legal documents that state your wishes and plans for medical care  These plans are made ahead of time in case you lose your ability to make decisions for yourself  Advance directives can apply to any medical decision, such as the treatments you want, and if you want to donate organs  What are the types of advance directives?   There are many types of advance directives, and each state has rules about how to use them  You may choose a combination of any of the following:  · Living will: This is a written record of the treatment you want  You can also choose which treatments you do not want, which to limit, and which to stop at a certain time  This includes surgery, medicine, IV fluid, and tube feedings  · Durable power of  for healthcare [facility]): This is a written record that states who you want to make healthcare choices for you when you are unable to make them for yourself  This person, called a proxy, is usually a family member or a friend  You may choose more than 1 proxy  · Do not resuscitate (DNR) order:  A DNR order is used in case your heart stops beating or you stop breathing  It is a request not to have certain forms of treatment, such as CPR  A DNR order may be included in other types of advance directives  · Medical directive: This covers the care that you want if you are in a coma, near death, or unable to make decisions for yourself  You can list the treatments you want for each condition  Treatment may include pain medicine, surgery, blood transfusions, dialysis, IV or tube feedings, and a ventilator (breathing machine)  · Values history: This document has questions about your views, beliefs, and how you feel and think about life  This information can help others choose the care that you would choose  Why are advance directives important? An advance directive helps you control your care  Although spoken wishes may be used, it is better to have your wishes written down  Spoken wishes can be misunderstood, or not followed  Treatments may be given even if you do not want them  An advance directive may make it easier for your family to make difficult choices about your care  Cigarette Smoking and Your Health   Risks to your health if you smoke:  Nicotine and other chemicals found in tobacco damage every cell in your body   Even if you are a light smoker, you have an increased risk for cancer, heart disease, and lung disease  If you are pregnant or have diabetes, smoking increases your risk for complications  Benefits to your health if you stop smoking:   · You decrease respiratory symptoms such as coughing, wheezing, and shortness of breath  · You reduce your risk for cancers of the lung, mouth, throat, kidney, bladder, pancreas, stomach, and cervix  If you already have cancer, you increase the benefits of chemotherapy  You also reduce your risk for cancer returning or a second cancer from developing  · You reduce your risk for heart disease, blood clots, heart attack, and stroke  · You reduce your risk for lung infections, and diseases such as pneumonia, asthma, chronic bronchitis, and emphysema  · Your circulation improves  More oxygen can be delivered to your body  If you have diabetes, you lower your risk for complications, such as kidney, artery, and eye diseases  You also lower your risk for nerve damage  Nerve damage can lead to amputations, poor vision, and blindness  · You improve your body's ability to heal and to fight infections  For more information and support to stop smoking:   · [employer]  Phone: 0- 010 - 518-8778  Web Address: www [employer]  Weight Management   Why it is important to manage your weight:  Being overweight increases your risk of health conditions such as heart disease, high blood pressure, type 2 diabetes, and certain types of cancer  It can also increase your risk for osteoarthritis, sleep apnea, and other respiratory problems  Aim for a slow, steady weight loss  Even a small amount of weight loss can lower your risk of health problems  How to lose weight safely:  A safe and healthy way to lose weight is to eat fewer calories and get regular exercise  You can lose up about 1 pound a week by decreasing the number of calories you eat by 500 calories each day     Healthy meal plan for weight management:  A healthy meal plan includes a variety of foods, contains fewer calories, and helps you stay healthy  A healthy meal plan includes the following:  · Eat whole-grain foods more often  A healthy meal plan should contain fiber  Fiber is the part of grains, fruits, and vegetables that is not broken down by your body  Whole-grain foods are healthy and provide extra fiber in your diet  Some examples of whole-grain foods are whole-wheat breads and pastas, oatmeal, brown rice, and bulgur  · Eat a variety of vegetables every day  Include dark, leafy greens such as spinach, kale, angel greens, and mustard greens  Eat yellow and orange vegetables such as carrots, sweet potatoes, and winter squash  · Eat a variety of fruits every day  Choose fresh or canned fruit (canned in its own juice or light syrup) instead of juice  Fruit juice has very little or no fiber  · Eat low-fat dairy foods  Drink fat-free (skim) milk or 1% milk  Eat fat-free yogurt and low-fat cottage cheese  Try low-fat cheeses such as mozzarella and other reduced-fat cheeses  · Choose meat and other protein foods that are low in fat  Choose beans or other legumes such as split peas or lentils  Choose fish, skinless poultry (chicken or turkey), or lean cuts of red meat (beef or pork)  Before you cook meat or poultry, cut off any visible fat  · Use less fat and oil  Try baking foods instead of frying them  Add less fat, such as margarine, sour cream, regular salad dressing and mayonnaise to foods  Eat fewer high-fat foods  Some examples of high-fat foods include french fries, doughnuts, ice cream, and cakes  · Eat fewer sweets  Limit foods and drinks that are high in sugar  This includes candy, cookies, regular soda, and sweetened drinks  Exercise:  Exercise at least 30 minutes per day on most days of the week  Some examples of exercise include walking, biking, dancing, and swimming   You can also fit in more physical activity by taking the stairs instead of the elevator or parking farther away from stores  Ask your healthcare provider about the best exercise plan for you  © Copyright [employer] 2018 Information is for End User's use only and may not be sold, redistributed or otherwise used for commercial purposes   All illustrations and images included in CareNotes® are the copyrighted property of [employer]  or [address] [place] [address]

## 2021-08-17 NOTE — PROGRESS NOTES
Assessment and Plan:     Problem List Items Addressed This Visit        Endocrine    Type 2 diabetes mellitus with diabetic neuropathy, without long-term current use of insulin ([address] )    Relevant Orders    Comprehensive metabolic panel    Hemoglobin A1C    Type 2 diabetes mellitus with stage 3 chronic kidney disease, without long-term current use of insulin (HCC)    Relevant Orders    Comprehensive metabolic panel    Hemoglobin A1C       Respiratory    COPD (chronic obstructive pulmonary disease) ([address] )       Cardiovascular and Mediastinum    Essential hypertension       Other    Hyperlipidemia      Other Visit Diagnoses     Medicare annual wellness visit, subsequent    -  Primary    Encounter for screening mammogram for breast cancer        Relevant Orders    Mammo screening bilateral w 3d & cad           Preventive health issues were discussed with patient, and age appropriate screening tests were ordered as noted in patient's After Visit Summary  Personalized health advice and appropriate referrals for health education or preventive services given if needed, as noted in patient's After Visit Summary       History of Present Illness:     Patient presents for Medicare Annual Wellness visit    Patient Care Team:  Trisha Duffy DO as PCP - General (Internal Medicine)     Problem List:     Patient Active Problem List   Diagnosis    Essential hypertension    Hyperlipidemia    COPD (chronic obstructive pulmonary disease) ([address] )    Snoring    Daytime sleepiness    Type 2 diabetes mellitus with diabetic neuropathy, without long-term current use of insulin ([facility] )    Type 2 diabetes mellitus with stage 3 chronic kidney disease, without long-term current use of insulin ([address] )      Past Medical and Surgical History:     Past Medical History:   Diagnosis Date    Asthma     Back pain     COPD (chronic obstructive pulmonary disease) ([address] )     COPD (chronic obstructive pulmonary disease) ([address] )     DVT (deep venous thrombosis) (HCC)     GERD (gastroesophageal reflux disease)     Hyperlipidemia     Hypertension      Past Surgical History:   Procedure Laterality Date    APPENDECTOMY      BREAST BIOPSY      BREAST CYST EXCISION Left 2010     SECTION      COLONOSCOPY      ESOPHAGOGASTRODUODENOSCOPY      HYSTERECTOMY      OOPHORECTOMY Bilateral       Family History:     Family History   Problem Relation Age of Onset    Colon cancer Mother 43    Esophageal cancer Father 64    Skin cancer Sister     No Known Problems Daughter     No Known Problems Maternal Grandmother     No Known Problems Maternal Grandfather     No Known Problems Paternal Grandmother     No Known Problems Paternal Grandfather     No Known Problems Son       Social History:     Social History     Socioeconomic History    Marital status: Single     Spouse name: None    Number of children: None    Years of education: None    Highest education level: None   Occupational History    None   Tobacco Use    Smoking status: Current Every Day Smoker     Packs/day: 1 00    Smokeless tobacco: Never Used   Vaping Use    Vaping Use: Never used   Substance and Sexual Activity    Alcohol use: No    Drug use: No    Sexual activity: None   Other Topics Concern    None   Social History Narrative    None     Social Determinants of Health     Financial Resource Strain:     Difficulty of Paying Living Expenses:    Food Insecurity:     Worried About Running Out of Food in the Last Year:     Ran Out of Food in the Last Year:    Transportation Needs:     Lack of Transportation (Medical):      Lack of Transportation (Non-Medical):    Physical Activity:     Days of Exercise per Week:     Minutes of Exercise per Session:    Stress:     Feeling of Stress :    Social Connections:     Frequency of Communication with Friends and Family:     Frequency of Social Gatherings with Friends and Family:     Attends [employer] Services:     Active Member of Clubs or Organizations:     Attends Club or Organization Meetings:     Marital Status:    Intimate Partner Violence:     Fear of Current or Ex-Partner:     Emotionally Abused:     Physically Abused:     Sexually Abused:       Medications and Allergies:     Current Outpatient Medications   Medication Sig Dispense Refill    albuterol (2 5 mg/3 mL) 0 083 % nebulizer solution Take 1 vial (2 5 mg total) by nebulization every 4 (four) hours as needed for wheezing or shortness of breath 60 vial 5    amLODIPine (NORVASC) 5 mg tablet Take 1 tablet (5 mg total) by mouth daily 90 tablet 1    atorvastatin (LIPITOR) 40 mg tablet Take 1 tablet (40 mg total) by mouth daily 90 tablet 1    B Complex-C (SUPER B COMPLEX/VITAMIN C PO) Take by mouth      Calcium Carbonate-Vitamin D 600-400 MG-UNIT per chew tablet Chew 1 tablet 2 (two) times a day for 90 days 180 tablet 0    cetirizine (ZyrTEC) 10 mg tablet Take 1 tablet (10 mg total) by mouth daily for 90 days 90 tablet 1    Cranberry 450 MG TABS Take by mouth      docusate sodium (COLACE) 100 mg capsule Take 100 mg by mouth      fluticasone-salmeterol (ADVAIR DISKUS) 250-50 mcg/dose inhaler Inhale 1 puff 2 (two) times a day 3 Inhaler 1    gabapentin (NEURONTIN) 300 mg capsule Take 1 capsule (300 mg total) by mouth 2 (two) times a day 180 capsule 1    metFORMIN (GLUCOPHAGE-XR) 500 mg 24 hr tablet Take 1 tablet (500 mg total) by mouth daily with dinner 90 tablet 1    Multiple Vitamins-Minerals (MULTIVITAMIN ADULT PO) Take by mouth      pantoprazole (PROTONIX) 40 mg tablet Take 1 tablet (40 mg total) by mouth daily 90 tablet 1    telmisartan (MICARDIS) 40 mg tablet Take 1 tablet (40 mg total) by mouth daily 90 tablet 1     No current facility-administered medications for this visit       Allergies   Allergen Reactions    Sulfa Antibiotics Rash      Immunizations:     Immunization History   Administered Date(s) Administered    INFLUENZA 11/19/2018    Influenza Quadrivalent Preservative Free 3 years and older IM 11/08/2017    Influenza Split High Dose Preservative Free IM 11/19/2018    Influenza Whole 10/24/2014    Influenza, Quadrivalent (nasal) 11/08/2017    Influenza, high dose seasonal 0 7 mL 10/15/2019, 10/06/2020    Influenza, seasonal, injectable 11/21/2016    Influenza, seasonal, injectable, preservative free 11/02/2015    Pneumococcal Conjugate 13-Valent 04/11/2019    Pneumococcal Polysaccharide PPV23 05/06/2013, 11/05/2020    SARS-CoV-2 / COVID-19 mRNA IM (Moderna) 03/18/2021, 04/15/2021    Tdap 08/10/2016    Zoster 06/02/2014      Health Maintenance:         Topic Date Due    Hepatitis C Screening  Never done    Breast Cancer Screening: Mammogram  11/17/2021    Colorectal Cancer Screening  06/28/2026         Topic Date Due    Influenza Vaccine (1) 09/01/2021      Medicare Health Risk Assessment:     /74 (BP Location: Right arm, Patient Position: Sitting, Cuff Size: Standard)   Pulse 100   Temp 97 7 °F (36 5 °C) (Temporal)   Resp 16   Ht 5' 1 5" (1 562 m)   Wt 80 7 kg (178 lb)   SpO2 93%   BMI 33 09 kg/m²      Barbi Deluna is here for her Subsequent Wellness visit  Health Risk Assessment:   Patient rates overall health as very good  Patient feels that their physical health rating is same  Patient is satisfied with their life  Eyesight was rated as same  Hearing was rated as same  Patient feels that their emotional and mental health rating is same  Patients states they are sometimes angry  Patient states they are sometimes unusually tired/fatigued  Pain experienced in the last 7 days has been none  Patient states that she has experienced no weight loss or gain in last 6 months  Depression Screening:   PHQ-2 Score: 0      Fall Risk Screening: In the past year, patient has experienced: no history of falling in past year      Urinary Incontinence Screening:   Patient has not leaked urine accidently in the last six months       Home Safety:  Patient does not have trouble with stairs inside or outside of their home  Patient has working smoke alarms and has working carbon monoxide detector  Nutrition:   Current diet is Regular  Medications:   Patient is currently taking over-the-counter supplements  OTC medications include: see medication list  Patient is able to manage medications  Activities of Daily Living (ADLs)/Instrumental Activities of Daily Living (IADLs):   Walk and transfer into and out of bed and chair?: Yes  Dress and groom yourself?: Yes    Bathe or shower yourself?: Yes    Feed yourself? Yes  Do your laundry/housekeeping?: Yes  Manage your money, pay your bills and track your expenses?: Yes  Make your own meals?: Yes    Do your own shopping?: Yes    Previous Hospitalizations:   Any hospitalizations or ED visits within the last 12 months?: Yes    How many hospitalizations have you had in the last year?: 1-2    Advance Care Planning:   Living will: No    Durable POA for healthcare: Yes    Advanced directive: No      Comments: Durable poa rachid busby her significant other and [phone]    [facility]- [phone]    Cognitive Screening:   Provider or family/friend/caregiver concerned regarding cognition?: No    PREVENTIVE SCREENINGS      Cardiovascular Screening:    General: Screening Not Indicated and History Lipid Disorder      Diabetes Screening:     General: Screening Not Indicated and History Diabetes      Colorectal Cancer Screening:     General: Screening Current      Breast Cancer Screening:     General: Screening Current      Cervical Cancer Screening:    General: Screening Not Indicated      Lung Cancer Screening:     General: Screening Not Indicated    Screening, Brief Intervention, and Referral to Treatment (SBIRT)    Screening    Typical number of drinks in a week: 0    Single Item Drug Screening:  How often have you used an illegal drug (including marijuana) or a prescription medication for non-medical reasons in the past year? never    Single Item Drug Screen Score: 0  Interpretation: Negative screen for possible drug use disorder      Rere Padilla, DO

## 2021-10-08 ENCOUNTER — APPOINTMENT (OUTPATIENT)
Dept: LAB | Facility: MEDICAL CENTER | Age: 71
End: 2021-10-08
Payer: COMMERCIAL

## 2021-10-08 DIAGNOSIS — E11.22 TYPE 2 DIABETES MELLITUS WITH STAGE 3 CHRONIC KIDNEY DISEASE, WITHOUT LONG-TERM CURRENT USE OF INSULIN, UNSPECIFIED WHETHER STAGE 3A OR 3B CKD (HCC): ICD-10-CM

## 2021-10-08 DIAGNOSIS — E11.40 TYPE 2 DIABETES MELLITUS WITH DIABETIC NEUROPATHY, WITHOUT LONG-TERM CURRENT USE OF INSULIN (HCC): ICD-10-CM

## 2021-10-08 DIAGNOSIS — N18.30 TYPE 2 DIABETES MELLITUS WITH STAGE 3 CHRONIC KIDNEY DISEASE, WITHOUT LONG-TERM CURRENT USE OF INSULIN, UNSPECIFIED WHETHER STAGE 3A OR 3B CKD (HCC): ICD-10-CM

## 2021-10-08 LAB
ALBUMIN SERPL BCP-MCNC: 3.4 G/DL (ref 3.5–5)
ALP SERPL-CCNC: 58 U/L (ref 46–116)
ALT SERPL W P-5'-P-CCNC: 23 U/L (ref 12–78)
ANION GAP SERPL CALCULATED.3IONS-SCNC: 3 MMOL/L (ref 4–13)
AST SERPL W P-5'-P-CCNC: 23 U/L (ref 5–45)
BILIRUB SERPL-MCNC: 0.7 MG/DL (ref 0.2–1)
BUN SERPL-MCNC: 17 MG/DL (ref 5–25)
CALCIUM ALBUM COR SERPL-MCNC: 10.1 MG/DL (ref 8.3–10.1)
CALCIUM SERPL-MCNC: 9.6 MG/DL (ref 8.3–10.1)
CHLORIDE SERPL-SCNC: 107 MMOL/L (ref 100–108)
CO2 SERPL-SCNC: 27 MMOL/L (ref 21–32)
CREAT SERPL-MCNC: 1.29 MG/DL (ref 0.6–1.3)
CREAT UR-MCNC: 348 MG/DL
EST. AVERAGE GLUCOSE BLD GHB EST-MCNC: 131 MG/DL
GFR SERPL CREATININE-BSD FRML MDRD: 43 ML/MIN/1.73SQ M
GLUCOSE P FAST SERPL-MCNC: 126 MG/DL (ref 65–99)
HBA1C MFR BLD: 6.2 %
MICROALBUMIN UR-MCNC: 51.8 MG/L (ref 0–20)
MICROALBUMIN/CREAT 24H UR: 15 MG/G CREATININE (ref 0–30)
POTASSIUM SERPL-SCNC: 4.4 MMOL/L (ref 3.5–5.3)
PROT SERPL-MCNC: 7.4 G/DL (ref 6.4–8.2)
SODIUM SERPL-SCNC: 137 MMOL/L (ref 136–145)

## 2021-10-08 PROCEDURE — 36415 COLL VENOUS BLD VENIPUNCTURE: CPT

## 2021-10-08 PROCEDURE — 80053 COMPREHEN METABOLIC PANEL: CPT

## 2021-10-08 PROCEDURE — 83036 HEMOGLOBIN GLYCOSYLATED A1C: CPT

## 2021-10-08 PROCEDURE — 82570 ASSAY OF URINE CREATININE: CPT | Performed by: INTERNAL MEDICINE

## 2021-10-08 PROCEDURE — 82043 UR ALBUMIN QUANTITATIVE: CPT | Performed by: INTERNAL MEDICINE

## 2021-11-17 ENCOUNTER — OFFICE VISIT (OUTPATIENT)
Dept: FAMILY MEDICINE CLINIC | Facility: CLINIC | Age: 71
End: 2021-11-17
Payer: COMMERCIAL

## 2021-11-17 VITALS
HEIGHT: 62 IN | BODY MASS INDEX: 33.31 KG/M2 | HEART RATE: 107 BPM | RESPIRATION RATE: 16 BRPM | SYSTOLIC BLOOD PRESSURE: 168 MMHG | WEIGHT: 181 LBS | OXYGEN SATURATION: 97 % | TEMPERATURE: 97.5 F | DIASTOLIC BLOOD PRESSURE: 66 MMHG

## 2021-11-17 DIAGNOSIS — E11.22 TYPE 2 DIABETES MELLITUS WITH STAGE 3 CHRONIC KIDNEY DISEASE, WITHOUT LONG-TERM CURRENT USE OF INSULIN (HCC): ICD-10-CM

## 2021-11-17 DIAGNOSIS — G62.9 NEUROPATHY: ICD-10-CM

## 2021-11-17 DIAGNOSIS — R10.13 DYSPEPSIA: ICD-10-CM

## 2021-11-17 DIAGNOSIS — I10 ESSENTIAL HYPERTENSION: ICD-10-CM

## 2021-11-17 DIAGNOSIS — E78.5 DYSLIPIDEMIA: ICD-10-CM

## 2021-11-17 DIAGNOSIS — N18.30 TYPE 2 DIABETES MELLITUS WITH STAGE 3 CHRONIC KIDNEY DISEASE, WITHOUT LONG-TERM CURRENT USE OF INSULIN (HCC): ICD-10-CM

## 2021-11-17 DIAGNOSIS — E11.40 TYPE 2 DIABETES MELLITUS WITH DIABETIC NEUROPATHY, WITHOUT LONG-TERM CURRENT USE OF INSULIN (HCC): Primary | ICD-10-CM

## 2021-11-17 DIAGNOSIS — J44.9 CHRONIC OBSTRUCTIVE PULMONARY DISEASE, UNSPECIFIED COPD TYPE (HCC): ICD-10-CM

## 2021-11-17 PROCEDURE — 1160F RVW MEDS BY RX/DR IN RCRD: CPT | Performed by: INTERNAL MEDICINE

## 2021-11-17 PROCEDURE — 4010F ACE/ARB THERAPY RXD/TAKEN: CPT | Performed by: INTERNAL MEDICINE

## 2021-11-17 PROCEDURE — 99214 OFFICE O/P EST MOD 30 MIN: CPT | Performed by: INTERNAL MEDICINE

## 2021-11-17 PROCEDURE — 1101F PT FALLS ASSESS-DOCD LE1/YR: CPT | Performed by: INTERNAL MEDICINE

## 2021-11-17 PROCEDURE — 3288F FALL RISK ASSESSMENT DOCD: CPT | Performed by: INTERNAL MEDICINE

## 2021-11-17 PROCEDURE — 3077F SYST BP >= 140 MM HG: CPT | Performed by: INTERNAL MEDICINE

## 2021-11-17 PROCEDURE — 3078F DIAST BP <80 MM HG: CPT | Performed by: INTERNAL MEDICINE

## 2021-11-17 PROCEDURE — 3066F NEPHROPATHY DOC TX: CPT | Performed by: INTERNAL MEDICINE

## 2021-11-17 PROCEDURE — 4004F PT TOBACCO SCREEN RCVD TLK: CPT | Performed by: INTERNAL MEDICINE

## 2021-11-17 PROCEDURE — 3008F BODY MASS INDEX DOCD: CPT | Performed by: INTERNAL MEDICINE

## 2021-11-17 PROCEDURE — 3725F SCREEN DEPRESSION PERFORMED: CPT | Performed by: INTERNAL MEDICINE

## 2021-11-17 RX ORDER — TELMISARTAN 40 MG/1
40 TABLET ORAL DAILY
Qty: 90 TABLET | Refills: 1 | Status: SHIPPED | OUTPATIENT
Start: 2021-11-17 | End: 2022-01-26 | Stop reason: SDUPTHER

## 2021-11-17 RX ORDER — GABAPENTIN 300 MG/1
300 CAPSULE ORAL 3 TIMES DAILY
Qty: 270 CAPSULE | Refills: 1 | Status: SHIPPED | OUTPATIENT
Start: 2021-11-17 | End: 2022-04-18 | Stop reason: SDUPTHER

## 2021-11-17 RX ORDER — GABAPENTIN 300 MG/1
300 CAPSULE ORAL 2 TIMES DAILY
Qty: 180 CAPSULE | Refills: 1 | Status: SHIPPED | OUTPATIENT
Start: 2021-11-17 | End: 2021-11-17 | Stop reason: SDUPTHER

## 2021-11-17 RX ORDER — ATORVASTATIN CALCIUM 40 MG/1
40 TABLET, FILM COATED ORAL DAILY
Qty: 90 TABLET | Refills: 1 | Status: SHIPPED | OUTPATIENT
Start: 2021-11-17 | End: 2022-06-21 | Stop reason: SDUPTHER

## 2021-11-17 RX ORDER — METFORMIN HYDROCHLORIDE 500 MG/1
500 TABLET, EXTENDED RELEASE ORAL
Qty: 90 TABLET | Refills: 1 | Status: SHIPPED | OUTPATIENT
Start: 2021-11-17 | End: 2022-04-18 | Stop reason: SDUPTHER

## 2021-11-17 RX ORDER — AMLODIPINE BESYLATE 10 MG/1
10 TABLET ORAL DAILY
Qty: 90 TABLET | Refills: 1 | Status: SHIPPED | OUTPATIENT
Start: 2021-11-17 | End: 2022-04-18 | Stop reason: SDUPTHER

## 2021-11-17 RX ORDER — PANTOPRAZOLE SODIUM 40 MG/1
40 TABLET, DELAYED RELEASE ORAL DAILY
Qty: 90 TABLET | Refills: 1 | Status: SHIPPED | OUTPATIENT
Start: 2021-11-17

## 2021-12-09 ENCOUNTER — VBI (OUTPATIENT)
Dept: ADMINISTRATIVE | Facility: OTHER | Age: 71
End: 2021-12-09

## 2021-12-21 ENCOUNTER — OFFICE VISIT (OUTPATIENT)
Dept: FAMILY MEDICINE CLINIC | Facility: CLINIC | Age: 71
End: 2021-12-21
Payer: COMMERCIAL

## 2021-12-21 ENCOUNTER — VBI (OUTPATIENT)
Dept: ADMINISTRATIVE | Facility: OTHER | Age: 71
End: 2021-12-21

## 2021-12-21 ENCOUNTER — HOSPITAL ENCOUNTER (OUTPATIENT)
Dept: RADIOLOGY | Facility: MEDICAL CENTER | Age: 71
Discharge: HOME | End: 2021-12-21
Payer: COMMERCIAL

## 2021-12-21 VITALS — WEIGHT: 181 LBS | HEIGHT: 62 IN | BODY MASS INDEX: 33.31 KG/M2

## 2021-12-21 VITALS
OXYGEN SATURATION: 94 % | HEART RATE: 108 BPM | TEMPERATURE: 97.9 F | RESPIRATION RATE: 16 BRPM | WEIGHT: 178 LBS | BODY MASS INDEX: 32.76 KG/M2 | DIASTOLIC BLOOD PRESSURE: 66 MMHG | HEIGHT: 62 IN | SYSTOLIC BLOOD PRESSURE: 122 MMHG

## 2021-12-21 DIAGNOSIS — E11.40 TYPE 2 DIABETES MELLITUS WITH DIABETIC NEUROPATHY, WITHOUT LONG-TERM CURRENT USE OF INSULIN (HCC): Primary | ICD-10-CM

## 2021-12-21 DIAGNOSIS — Z12.31 ENCOUNTER FOR SCREENING MAMMOGRAM FOR MALIGNANT NEOPLASM OF BREAST: ICD-10-CM

## 2021-12-21 DIAGNOSIS — N18.30 TYPE 2 DIABETES MELLITUS WITH STAGE 3 CHRONIC KIDNEY DISEASE, WITHOUT LONG-TERM CURRENT USE OF INSULIN, UNSPECIFIED WHETHER STAGE 3A OR 3B CKD (HCC): ICD-10-CM

## 2021-12-21 DIAGNOSIS — E11.22 TYPE 2 DIABETES MELLITUS WITH STAGE 3 CHRONIC KIDNEY DISEASE, WITHOUT LONG-TERM CURRENT USE OF INSULIN, UNSPECIFIED WHETHER STAGE 3A OR 3B CKD (HCC): ICD-10-CM

## 2021-12-21 DIAGNOSIS — Z12.31 ENCOUNTER FOR SCREENING MAMMOGRAM FOR BREAST CANCER: ICD-10-CM

## 2021-12-21 DIAGNOSIS — I10 ESSENTIAL HYPERTENSION: ICD-10-CM

## 2021-12-21 DIAGNOSIS — E78.5 HYPERLIPIDEMIA, UNSPECIFIED HYPERLIPIDEMIA TYPE: ICD-10-CM

## 2021-12-21 PROCEDURE — 3288F FALL RISK ASSESSMENT DOCD: CPT | Performed by: INTERNAL MEDICINE

## 2021-12-21 PROCEDURE — 3066F NEPHROPATHY DOC TX: CPT | Performed by: INTERNAL MEDICINE

## 2021-12-21 PROCEDURE — 3078F DIAST BP <80 MM HG: CPT | Performed by: INTERNAL MEDICINE

## 2021-12-21 PROCEDURE — 4004F PT TOBACCO SCREEN RCVD TLK: CPT | Performed by: INTERNAL MEDICINE

## 2021-12-21 PROCEDURE — 3008F BODY MASS INDEX DOCD: CPT | Performed by: INTERNAL MEDICINE

## 2021-12-21 PROCEDURE — 77063 BREAST TOMOSYNTHESIS BI: CPT

## 2021-12-21 PROCEDURE — 3725F SCREEN DEPRESSION PERFORMED: CPT | Performed by: INTERNAL MEDICINE

## 2021-12-21 PROCEDURE — 1101F PT FALLS ASSESS-DOCD LE1/YR: CPT | Performed by: INTERNAL MEDICINE

## 2021-12-21 PROCEDURE — 77067 SCR MAMMO BI INCL CAD: CPT

## 2021-12-21 PROCEDURE — 99214 OFFICE O/P EST MOD 30 MIN: CPT | Performed by: INTERNAL MEDICINE

## 2021-12-21 PROCEDURE — 1160F RVW MEDS BY RX/DR IN RCRD: CPT | Performed by: INTERNAL MEDICINE

## 2021-12-21 PROCEDURE — 3074F SYST BP LT 130 MM HG: CPT | Performed by: INTERNAL MEDICINE

## 2022-01-26 DIAGNOSIS — I10 ESSENTIAL HYPERTENSION: ICD-10-CM

## 2022-01-27 PROCEDURE — 4010F ACE/ARB THERAPY RXD/TAKEN: CPT | Performed by: INTERNAL MEDICINE

## 2022-01-27 RX ORDER — TELMISARTAN 40 MG/1
40 TABLET ORAL DAILY
Qty: 90 TABLET | Refills: 1 | Status: SHIPPED | OUTPATIENT
Start: 2022-01-27 | End: 2022-06-21 | Stop reason: SDUPTHER

## 2022-02-09 ENCOUNTER — APPOINTMENT (OUTPATIENT)
Dept: LAB | Facility: MEDICAL CENTER | Age: 72
End: 2022-02-09
Payer: COMMERCIAL

## 2022-02-09 DIAGNOSIS — E78.5 HYPERLIPIDEMIA, UNSPECIFIED HYPERLIPIDEMIA TYPE: ICD-10-CM

## 2022-02-09 DIAGNOSIS — E11.40 TYPE 2 DIABETES MELLITUS WITH DIABETIC NEUROPATHY, WITHOUT LONG-TERM CURRENT USE OF INSULIN (HCC): ICD-10-CM

## 2022-02-09 LAB
ALBUMIN SERPL BCP-MCNC: 3.7 G/DL (ref 3.5–5)
ALP SERPL-CCNC: 65 U/L (ref 46–116)
ALT SERPL W P-5'-P-CCNC: 29 U/L (ref 12–78)
ANION GAP SERPL CALCULATED.3IONS-SCNC: 5 MMOL/L (ref 4–13)
AST SERPL W P-5'-P-CCNC: 20 U/L (ref 5–45)
BILIRUB SERPL-MCNC: 0.49 MG/DL (ref 0.2–1)
BUN SERPL-MCNC: 23 MG/DL (ref 5–25)
CALCIUM SERPL-MCNC: 10.3 MG/DL (ref 8.3–10.1)
CHLORIDE SERPL-SCNC: 107 MMOL/L (ref 100–108)
CHOLEST SERPL-MCNC: 160 MG/DL
CO2 SERPL-SCNC: 28 MMOL/L (ref 21–32)
CREAT SERPL-MCNC: 1.18 MG/DL (ref 0.6–1.3)
GFR SERPL CREATININE-BSD FRML MDRD: 47 ML/MIN/1.73SQ M
GLUCOSE P FAST SERPL-MCNC: 153 MG/DL (ref 65–99)
HDLC SERPL-MCNC: 48 MG/DL
LDLC SERPL CALC-MCNC: 67 MG/DL (ref 0–100)
NONHDLC SERPL-MCNC: 112 MG/DL
POTASSIUM SERPL-SCNC: 4.3 MMOL/L (ref 3.5–5.3)
PROT SERPL-MCNC: 8 G/DL (ref 6.4–8.2)
SODIUM SERPL-SCNC: 140 MMOL/L (ref 136–145)
TRIGL SERPL-MCNC: 225 MG/DL

## 2022-02-09 PROCEDURE — 80053 COMPREHEN METABOLIC PANEL: CPT

## 2022-02-09 PROCEDURE — 83036 HEMOGLOBIN GLYCOSYLATED A1C: CPT

## 2022-02-09 PROCEDURE — 36415 COLL VENOUS BLD VENIPUNCTURE: CPT

## 2022-02-09 PROCEDURE — 80061 LIPID PANEL: CPT

## 2022-02-10 LAB
EST. AVERAGE GLUCOSE BLD GHB EST-MCNC: 146 MG/DL
HBA1C MFR BLD: 6.7 %

## 2022-04-11 ENCOUNTER — OFFICE VISIT (OUTPATIENT)
Dept: URGENT CARE | Facility: MEDICAL CENTER | Age: 72
End: 2022-04-11
Payer: COMMERCIAL

## 2022-04-11 VITALS
RESPIRATION RATE: 18 BRPM | HEART RATE: 116 BPM | BODY MASS INDEX: 33.68 KG/M2 | TEMPERATURE: 97.7 F | WEIGHT: 183 LBS | OXYGEN SATURATION: 92 % | HEIGHT: 62 IN | SYSTOLIC BLOOD PRESSURE: 160 MMHG | DIASTOLIC BLOOD PRESSURE: 67 MMHG

## 2022-04-11 DIAGNOSIS — J44.1 COPD EXACERBATION (HCC): ICD-10-CM

## 2022-04-11 DIAGNOSIS — R10.9 FLANK PAIN: Primary | ICD-10-CM

## 2022-04-11 LAB
SL AMB  POCT GLUCOSE, UA: ABNORMAL
SL AMB LEUKOCYTE ESTERASE,UA: ABNORMAL
SL AMB POCT BILIRUBIN,UA: ABNORMAL
SL AMB POCT BLOOD,UA: ABNORMAL
SL AMB POCT CLARITY,UA: CLEAR
SL AMB POCT COLOR,UA: ABNORMAL
SL AMB POCT KETONES,UA: 15
SL AMB POCT NITRITE,UA: ABNORMAL
SL AMB POCT PH,UA: 6.5
SL AMB POCT SPECIFIC GRAVITY,UA: 1.01
SL AMB POCT URINE PROTEIN: ABNORMAL
SL AMB POCT UROBILINOGEN: 0.2

## 2022-04-11 PROCEDURE — 81002 URINALYSIS NONAUTO W/O SCOPE: CPT | Performed by: PHYSICIAN ASSISTANT

## 2022-04-11 PROCEDURE — S9083 URGENT CARE CENTER GLOBAL: HCPCS | Performed by: PHYSICIAN ASSISTANT

## 2022-04-11 PROCEDURE — 87086 URINE CULTURE/COLONY COUNT: CPT | Performed by: PHYSICIAN ASSISTANT

## 2022-04-11 PROCEDURE — 99213 OFFICE O/P EST LOW 20 MIN: CPT | Performed by: PHYSICIAN ASSISTANT

## 2022-04-11 RX ORDER — ALBUTEROL SULFATE 2.5 MG/3ML
2.5 SOLUTION RESPIRATORY (INHALATION) EVERY 4 HOURS PRN
Status: CANCELLED | OUTPATIENT
Start: 2022-04-11

## 2022-04-11 RX ORDER — ALBUTEROL SULFATE 2.5 MG/3ML
2.5 SOLUTION RESPIRATORY (INHALATION) EVERY 6 HOURS PRN
Qty: 360 ML | Refills: 0 | Status: SHIPPED | OUTPATIENT
Start: 2022-04-11

## 2022-04-11 RX ORDER — ASCORBIC ACID 500 MG
500 TABLET ORAL DAILY
COMMUNITY

## 2022-04-11 RX ORDER — CIPROFLOXACIN 500 MG/1
500 TABLET, FILM COATED ORAL EVERY 12 HOURS SCHEDULED
Qty: 14 TABLET | Refills: 0 | Status: SHIPPED | OUTPATIENT
Start: 2022-04-11 | End: 2022-04-18

## 2022-04-11 NOTE — PROGRESS NOTES
[facility]        NAME: Ketty Church is a 73 y o  female  : 1950    MRN: [id number]  DATE: 2022  TIME: 10:53 AM    Assessment and Plan   Flank pain [R10 9]  1  Flank pain  POCT urine dip    Urine culture   2  COPD exacerbation (HCC)       Urine dip does show moderate leukocytes and she does have flank pain and has history of diabetes, will treat as UTI with possible pyelonephritis  Patient also has COPD exacerbation  Given refill of albuterol nebulizer and Cipro for this as well  Recommended stopping smoking especially when sick  Pulse ox given and advised to report to ER if drops below 90%    Patient Instructions     Follow up with PCP in 3-5 days  Proceed to  ER if symptoms worsen  Chief Complaint     Chief Complaint   Patient presents with    Flank Pain    Cold Like Symptoms     2 weeks         History of Present Illness       Patient is a 82-year-old female who presents today with complaints of cough and congestion for the past 2 weeks as well as left flank pain for the past few days  She denies any dysuria but does report some frequency denies any urgency  Patient is a diabetic as well  No fevers or chills  Patient has a history of COPD, admits to not in using her inhalers or nebulizers much  She is still a smoker  Cough is nonproductive at this point  She does feel short of breath at times with some wheezing  No abdominal pain  Review of Systems   Review of Systems   Constitutional: Negative for chills and fever  HENT: Positive for congestion  Negative for ear pain and sore throat  Respiratory: Positive for cough, shortness of breath and wheezing  Cardiovascular: Negative for chest pain  Gastrointestinal: Negative for abdominal distention, abdominal pain, nausea and vomiting  Genitourinary: Positive for flank pain and frequency  Negative for difficulty urinating, dysuria, hematuria and urgency  Musculoskeletal: Negative for myalgias  Current Medications       Current Outpatient Medications:     albuterol (2 5 mg/3 mL) 0 083 % nebulizer solution, Take 1 vial (2 5 mg total) by nebulization every 4 (four) hours as needed for wheezing or shortness of breath, Disp: 60 vial, Rfl: 5    amLODIPine (NORVASC) 10 mg tablet, Take 1 tablet (10 mg total) by mouth daily, Disp: 90 tablet, Rfl: 1    atorvastatin (LIPITOR) 40 mg tablet, Take 1 tablet (40 mg total) by mouth daily, Disp: 90 tablet, Rfl: 1    B Complex-C (SUPER B COMPLEX/VITAMIN C PO), Take by mouth, Disp: , Rfl:     Cranberry 450 MG TABS, Take by mouth, Disp: , Rfl:     Cyanocobalamin (VITAMIN B-12 PO), Take by mouth, Disp: , Rfl:     docusate sodium (COLACE) 100 mg capsule, Take 100 mg by mouth, Disp: , Rfl:     gabapentin (NEURONTIN) 300 mg capsule, Take 1 capsule (300 mg total) by mouth 3 (three) times a day Please change from bid to tid, Disp: 270 capsule, Rfl: 1    metFORMIN (GLUCOPHAGE-XR) 500 mg 24 hr tablet, Take 1 tablet (500 mg total) by mouth daily with dinner, Disp: 90 tablet, Rfl: 1    Multiple Vitamins-Minerals (MULTIVITAMIN ADULT PO), Take by mouth, Disp: , Rfl:     pantoprazole (PROTONIX) 40 mg tablet, Take 1 tablet (40 mg total) by mouth daily, Disp: 90 tablet, Rfl: 1    telmisartan (MICARDIS) 40 mg tablet, Take 1 tablet (40 mg total) by mouth daily, Disp: 90 tablet, Rfl: 1    TURMERIC PO, Take by mouth, Disp: , Rfl:     ascorbic acid (VITAMIN C) 500 MG tablet, Take 500 mg by mouth daily, Disp: , Rfl:     Calcium Carbonate-Vitamin D 600-400 MG-UNIT per chew tablet, Chew 1 tablet 2 (two) times a day for 90 days, Disp: 180 tablet, Rfl: 0    cetirizine (ZyrTEC) 10 mg tablet, Take 1 tablet (10 mg total) by mouth daily for 90 days, Disp: 90 tablet, Rfl: 1    fluticasone-salmeterol (ADVAIR DISKUS) 250-50 mcg/dose inhaler, Inhale 1 puff 2 (two) times a day (Patient not taking: Reported on 11/17/2021 ), Disp: 3 Inhaler, Rfl: 1    Current Allergies     Allergies as of 2022 - Reviewed 2022   Allergen Reaction Noted    Sulfa antibiotics Rash             The following portions of the patient's history were reviewed and updated as appropriate: allergies, current medications, past family history, past medical history, past social history, past surgical history and problem list      Past Medical History:   Diagnosis Date    Asthma     Back pain     [facility]     [facility] obstructive pulmonary disease) [facility] )     DVT [facility]deep venous thrombosis) ([facility] )     GERD (gastroesophageal reflux disease)     Hyperlipidemia     Hypertension        Past Surgical History:   Procedure Laterality Date    APPENDECTOMY      BREAST BIOPSY      BREAST CYST EXCISION Left 2010     SECTION      COLONOSCOPY      ESOPHAGOGASTRODUODENOSCOPY      HYSTERECTOMY      OOPHORECTOMY Bilateral        Family History   Problem Relation Age of Onset    Colon cancer Mother 43    Esophageal cancer Father 64    Skin cancer Sister     No Known Problems Daughter     No Known Problems Maternal Grandmother     No Known Problems Maternal Grandfather     No Known Problems Paternal Grandmother     No Known Problems Paternal Grandfather     No Known Problems Son          Medications have been verified  Objective   /67   Pulse (!) 116   Temp 97 7 °F (36 5 °C)   Resp 18   Ht 5' 1 5" (1 562 m)   Wt 83 kg (183 lb)   SpO2 92%   BMI 34 02 kg/m²        Physical Exam     Physical Exam  Constitutional:       General: She is not in acute distress  Appearance: Normal appearance  She is obese  She is not ill-appearing or toxic-appearing  HENT:      Head: Normocephalic and atraumatic  Right Ear: Tympanic membrane and ear canal normal       Left Ear: Tympanic membrane and ear canal normal       Nose: Congestion present  No rhinorrhea  Mouth/Throat:      Mouth: Mucous membranes are moist       Pharynx: Oropharynx is clear  Cardiovascular:      Rate and Rhythm: Regular rhythm  Tachycardia present  Pulmonary:      Effort: Pulmonary effort is normal  No tachypnea, accessory muscle usage or respiratory distress  Breath sounds: No stridor, decreased air movement or transmitted upper airway sounds  Wheezing present  No decreased breath sounds, rhonchi or rales  Comments: Mild wheezing throughout all lung fields  Abdominal:      General: Bowel sounds are normal  There is no distension  Palpations: Abdomen is soft  Tenderness: There is no abdominal tenderness  There is left CVA tenderness  There is no right CVA tenderness, guarding or rebound  Skin:     General: Skin is warm and dry  Neurological:      Mental Status: She is alert

## 2022-04-12 LAB — BACTERIA UR CULT: NORMAL

## 2022-04-18 ENCOUNTER — OFFICE VISIT (OUTPATIENT)
Dept: FAMILY MEDICINE CLINIC | Facility: CLINIC | Age: 72
End: 2022-04-18
Payer: COMMERCIAL

## 2022-04-18 ENCOUNTER — APPOINTMENT (OUTPATIENT)
Dept: RADIOLOGY | Facility: MEDICAL CENTER | Age: 72
End: 2022-04-18
Payer: COMMERCIAL

## 2022-04-18 VITALS
DIASTOLIC BLOOD PRESSURE: 60 MMHG | RESPIRATION RATE: 16 BRPM | TEMPERATURE: 97.7 F | OXYGEN SATURATION: 95 % | HEIGHT: 62 IN | SYSTOLIC BLOOD PRESSURE: 128 MMHG | HEART RATE: 103 BPM | WEIGHT: 184 LBS | BODY MASS INDEX: 33.86 KG/M2

## 2022-04-18 DIAGNOSIS — E11.22 TYPE 2 DIABETES MELLITUS WITH STAGE 3 CHRONIC KIDNEY DISEASE, WITHOUT LONG-TERM CURRENT USE OF INSULIN, UNSPECIFIED WHETHER STAGE 3A OR 3B CKD (HCC): ICD-10-CM

## 2022-04-18 DIAGNOSIS — N18.30 TYPE 2 DIABETES MELLITUS WITH STAGE 3 CHRONIC KIDNEY DISEASE, WITHOUT LONG-TERM CURRENT USE OF INSULIN, UNSPECIFIED WHETHER STAGE 3A OR 3B CKD (HCC): ICD-10-CM

## 2022-04-18 DIAGNOSIS — E11.40 TYPE 2 DIABETES MELLITUS WITH DIABETIC NEUROPATHY, WITHOUT LONG-TERM CURRENT USE OF INSULIN (HCC): Primary | ICD-10-CM

## 2022-04-18 DIAGNOSIS — R07.81 RIB PAIN ON LEFT SIDE: ICD-10-CM

## 2022-04-18 DIAGNOSIS — I10 ESSENTIAL HYPERTENSION: ICD-10-CM

## 2022-04-18 DIAGNOSIS — G62.9 NEUROPATHY: ICD-10-CM

## 2022-04-18 DIAGNOSIS — E11.22 TYPE 2 DIABETES MELLITUS WITH STAGE 3 CHRONIC KIDNEY DISEASE, WITHOUT LONG-TERM CURRENT USE OF INSULIN (HCC): ICD-10-CM

## 2022-04-18 DIAGNOSIS — N18.30 TYPE 2 DIABETES MELLITUS WITH STAGE 3 CHRONIC KIDNEY DISEASE, WITHOUT LONG-TERM CURRENT USE OF INSULIN (HCC): ICD-10-CM

## 2022-04-18 PROCEDURE — 3066F NEPHROPATHY DOC TX: CPT | Performed by: INTERNAL MEDICINE

## 2022-04-18 PROCEDURE — 4004F PT TOBACCO SCREEN RCVD TLK: CPT | Performed by: INTERNAL MEDICINE

## 2022-04-18 PROCEDURE — 3078F DIAST BP <80 MM HG: CPT | Performed by: INTERNAL MEDICINE

## 2022-04-18 PROCEDURE — 1003F LEVEL OF ACTIVITY ASSESS: CPT | Performed by: INTERNAL MEDICINE

## 2022-04-18 PROCEDURE — 3074F SYST BP LT 130 MM HG: CPT | Performed by: INTERNAL MEDICINE

## 2022-04-18 PROCEDURE — 71101 X-RAY EXAM UNILAT RIBS/CHEST: CPT

## 2022-04-18 PROCEDURE — 99214 OFFICE O/P EST MOD 30 MIN: CPT | Performed by: INTERNAL MEDICINE

## 2022-04-18 PROCEDURE — 1160F RVW MEDS BY RX/DR IN RCRD: CPT | Performed by: INTERNAL MEDICINE

## 2022-04-18 PROCEDURE — 3008F BODY MASS INDEX DOCD: CPT | Performed by: INTERNAL MEDICINE

## 2022-04-18 RX ORDER — GABAPENTIN 300 MG/1
300 CAPSULE ORAL 3 TIMES DAILY
Qty: 270 CAPSULE | Refills: 1 | Status: SHIPPED | OUTPATIENT
Start: 2022-04-18

## 2022-04-18 RX ORDER — CYCLOBENZAPRINE HCL 10 MG
10 TABLET ORAL
Qty: 15 TABLET | Refills: 1 | Status: SHIPPED | OUTPATIENT
Start: 2022-04-18 | End: 2022-05-17

## 2022-04-18 RX ORDER — METFORMIN HYDROCHLORIDE 500 MG/1
500 TABLET, EXTENDED RELEASE ORAL
Qty: 90 TABLET | Refills: 1 | Status: SHIPPED | OUTPATIENT
Start: 2022-04-18

## 2022-04-18 RX ORDER — AMLODIPINE BESYLATE 10 MG/1
10 TABLET ORAL DAILY
Qty: 90 TABLET | Refills: 1 | Status: SHIPPED | OUTPATIENT
Start: 2022-04-18

## 2022-04-18 NOTE — PROGRESS NOTES
BMI Counseling: Body mass index is 34 2 kg/m²  The BMI is above normal  Nutrition recommendations include decreasing portion sizes and limiting drinks that contain sugar  Exercise recommendations include exercising 3-5 times per week  No pharmacotherapy was ordered  Patient referred to PCP  Rationale for BMI follow-up plan is due to patient being overweight or obese  Assessment/Plan:         Diagnoses and all orders for this visit:    Type 2 diabetes mellitus with diabetic neuropathy, without long-term current use of insulin (HCC)  -     Hemoglobin A1C; Future  -     Comprehensive metabolic panel; Future    Type 2 diabetes mellitus with stage 3 chronic kidney disease, without long-term current use of insulin, unspecified whether stage 3a or 3b CKD ([facility] [address] )    Essential hypertension  Comments:  stable  Orders:  -     amLODIPine (NORVASC) 10 mg tablet; Take 1 tablet (10 mg total) by mouth daily    Neuropathy  Comments:  stable on gabapentine  Orders:  -     gabapentin (NEURONTIN) 300 mg capsule; Take 1 capsule (300 mg total) by mouth 3 (three) times a day Please change from bid to tid    Type 2 diabetes mellitus with stage 3 chronic kidney disease, without long-term current use of insulin ([facility])  Comments:  rto 3 months  she is aware her prediabetes a1c in creased to type 2 dm  Orders:  -     metFORMIN (GLUCOPHAGE-XR) 500 mg 24 hr tablet; Take 1 tablet (500 mg total) by mouth daily with dinner    Rib pain on left side  Comments:  discussed can take 4th gabapentine prn if pain persists  will take lidoderm and tylenol and muscle relaxer for now  Orders:  -     XR ribs left w pa chest min 3 views; Future  -     cyclobenzaprine (FLEXERIL) 10 mg tablet; Take 1 tablet (10 mg total) by mouth daily at bedtime          Subjective:      Patient ID: Pushpa Field is a 73 y o  female  Pt complains of pain in left dermatome  She says has had this pain on and off x months  Denies rash like shingles  Denies dysuria  Given Ab of cipro  She does not see much difference  On Ab x 7 days  Denies twisting thorax or rib pain with cough +ckd-3 +neuropathy  Denies retinopathy  On statin and arb  Needs rx  Discussed to take tylenol and add lidoderm pathch  Told avoid nsaid  She had 10mg flexeril in past and will try hs dose  The following portions of the patient's history were reviewed and updated as appropriate: She  has a past medical history of Asthma, Back pain, COPD (chronic obstructive pulmonary disease) ([place] [address] ), COPD (chronic obstructive pulmonary disease) ([place] [address] ), DVT (deep venous thrombosis) ([place] [address] ), GERD (gastroesophageal reflux disease), Hyperlipidemia, and Hypertension  She   Patient Active Problem List    Diagnosis Date Noted    Type 2 diabetes mellitus with stage 3 chronic kidney disease, without long-term current use of insulin ([place] [address] ) 2020    Type 2 diabetes mellitus with diabetic neuropathy, without long-term current use of insulin ([place] [address] ) 2020    Snoring     Daytime sleepiness     Hyperlipidemia 2019    COPD (chronic obstructive pulmonary disease) ([place] [address] ) 2019    Essential hypertension 2019     She  has a past surgical history that includes  section; Colonoscopy; Appendectomy; Esophagogastroduodenoscopy; Breast biopsy; Breast cyst excision (Left, 2010); Hysterectomy; and Oophorectomy (Bilateral)  Her family history includes Colon cancer (age of onset: 43) in her mother; Esophageal cancer (age of onset: 64) in her father; No Known Problems in her daughter, maternal grandfather, maternal grandmother, paternal grandfather, paternal grandmother, and son; Skin cancer in her sister  She  reports that she has been smoking cigarettes  She has a 54 00 pack-year smoking history  She has never used smokeless tobacco  She reports that she does not drink alcohol and does not use drugs    Current Outpatient Medications   Medication Sig Dispense Refill    albuterol (2 5 mg/3 mL) 0 083 % nebulizer solution Take 1 vial (2 5 mg total) by nebulization every 4 (four) hours as needed for wheezing or shortness of breath 60 vial 5    albuterol (2 5 mg/3 mL) 0 083 % nebulizer solution Take 3 mL (2 5 mg total) by nebulization every 6 (six) hours as needed for wheezing or shortness of breath 360 mL 0    amLODIPine (NORVASC) 10 mg tablet Take 1 tablet (10 mg total) by mouth daily 90 tablet 1    ascorbic acid (VITAMIN C) 500 MG tablet Take 500 mg by mouth daily      atorvastatin (LIPITOR) 40 mg tablet Take 1 tablet (40 mg total) by mouth daily 90 tablet 1    B Complex-C (SUPER B COMPLEX/VITAMIN C PO) Take by mouth      Cranberry 450 MG TABS Take by mouth      Cyanocobalamin (VITAMIN B-12 PO) Take by mouth      docusate sodium (COLACE) 100 mg capsule Take 100 mg by mouth      gabapentin (NEURONTIN) 300 mg capsule Take 1 capsule (300 mg total) by mouth 3 (three) times a day Please change from bid to tid 270 capsule 1    metFORMIN (GLUCOPHAGE-XR) 500 mg 24 hr tablet Take 1 tablet (500 mg total) by mouth daily with dinner 90 tablet 1    Multiple Vitamins-Minerals (MULTIVITAMIN ADULT PO) Take by mouth      pantoprazole (PROTONIX) 40 mg tablet Take 1 tablet (40 mg total) by mouth daily 90 tablet 1    telmisartan (MICARDIS) 40 mg tablet Take 1 tablet (40 mg total) by mouth daily 90 tablet 1    TURMERIC PO Take by mouth      Calcium Carbonate-Vitamin D 600-400 MG-UNIT per chew tablet Chew 1 tablet 2 (two) times a day for 90 days 180 tablet 0    cetirizine (ZyrTEC) 10 mg tablet Take 1 tablet (10 mg total) by mouth daily for 90 days 90 tablet 1    cyclobenzaprine (FLEXERIL) 10 mg tablet Take 1 tablet (10 mg total) by mouth daily at bedtime 15 tablet 1    fluticasone-salmeterol (ADVAIR DISKUS) 250-50 mcg/dose inhaler Inhale 1 puff 2 (two) times a day (Patient not taking: Reported on 11/17/2021 ) 3 Inhaler 1     No current facility-administered medications for this visit       Current Outpatient Medications on File Prior to Visit   Medication Sig    albuterol (2 5 mg/3 mL) 0 083 % nebulizer solution Take 1 vial (2 5 mg total) by nebulization every 4 (four) hours as needed for wheezing or shortness of breath    albuterol (2 5 mg/3 mL) 0 083 % nebulizer solution Take 3 mL (2 5 mg total) by nebulization every 6 (six) hours as needed for wheezing or shortness of breath    ascorbic acid (VITAMIN C) 500 MG tablet Take 500 mg by mouth daily    atorvastatin (LIPITOR) 40 mg tablet Take 1 tablet (40 mg total) by mouth daily    B Complex-C (SUPER B COMPLEX/VITAMIN C PO) Take by mouth    Cranberry 450 MG TABS Take by mouth    Cyanocobalamin (VITAMIN B-12 PO) Take by mouth    docusate sodium (COLACE) 100 mg capsule Take 100 mg by mouth    Multiple Vitamins-Minerals (MULTIVITAMIN ADULT PO) Take by mouth    pantoprazole (PROTONIX) 40 mg tablet Take 1 tablet (40 mg total) by mouth daily    telmisartan (MICARDIS) 40 mg tablet Take 1 tablet (40 mg total) by mouth daily    TURMERIC PO Take by mouth    Calcium Carbonate-Vitamin D 600-400 MG-UNIT per chew tablet Chew 1 tablet 2 (two) times a day for 90 days    cetirizine (ZyrTEC) 10 mg tablet Take 1 tablet (10 mg total) by mouth daily for 90 days    [] ciprofloxacin (CIPRO) 500 mg tablet Take 1 tablet (500 mg total) by mouth every 12 (twelve) hours for 7 days (Patient not taking: Reported on 2022 )    fluticasone-salmeterol (ADVAIR DISKUS) 250-50 mcg/dose inhaler Inhale 1 puff 2 (two) times a day (Patient not taking: Reported on 2021 )     No current facility-administered medications on file prior to visit  She is allergic to sulfa antibiotics       Review of Systems   Constitutional: Negative for chills and fever  HENT: Negative  Respiratory: Negative  Cardiovascular: Negative      Musculoskeletal:        Left rib pain         Objective:      /60 (BP Location: Left arm, Patient Position: Sitting, Cuff Size: Large)   Pulse 103 Temp 97 7 °F (36 5 °C) (Temporal)   Resp 16   Ht 5' 1 5" (1 562 m)   Wt 83 5 kg (184 lb)   SpO2 95%   BMI 34 20 kg/m²          Physical Exam  Constitutional:       Appearance: She is obese  HENT:      Head: Normocephalic and atraumatic  Right Ear: Tympanic membrane and ear canal normal       Left Ear: Tympanic membrane and ear canal normal    Cardiovascular:      Rate and Rhythm: Normal rate and regular rhythm  Pulmonary:      Effort: Pulmonary effort is normal       Breath sounds: Normal breath sounds  Musculoskeletal:      Cervical back: Neck supple  Comments: Reproducible pain over left ribs   Lymphadenopathy:      Cervical: No cervical adenopathy  Neurological:      Mental Status: She is alert

## 2022-05-16 DIAGNOSIS — R07.81 RIB PAIN ON LEFT SIDE: ICD-10-CM

## 2022-05-17 RX ORDER — CYCLOBENZAPRINE HCL 10 MG
TABLET ORAL
Qty: 15 TABLET | Refills: 1 | Status: SHIPPED | OUTPATIENT
Start: 2022-05-17 | End: 2022-06-21 | Stop reason: SDUPTHER

## 2022-06-21 DIAGNOSIS — E78.5 DYSLIPIDEMIA: ICD-10-CM

## 2022-06-21 DIAGNOSIS — I10 ESSENTIAL HYPERTENSION: ICD-10-CM

## 2022-06-21 DIAGNOSIS — R07.81 RIB PAIN ON LEFT SIDE: ICD-10-CM

## 2022-06-22 RX ORDER — CYCLOBENZAPRINE HCL 10 MG
10 TABLET ORAL
Qty: 30 TABLET | Refills: 1 | Status: SHIPPED | OUTPATIENT
Start: 2022-06-22

## 2022-06-22 RX ORDER — TELMISARTAN 40 MG/1
40 TABLET ORAL DAILY
Qty: 90 TABLET | Refills: 1 | Status: SHIPPED | OUTPATIENT
Start: 2022-06-22

## 2022-06-22 RX ORDER — ATORVASTATIN CALCIUM 40 MG/1
40 TABLET, FILM COATED ORAL DAILY
Qty: 90 TABLET | Refills: 1 | Status: SHIPPED | OUTPATIENT
Start: 2022-06-22

## 2022-08-08 ENCOUNTER — APPOINTMENT (OUTPATIENT)
Dept: RADIOLOGY | Facility: MEDICAL CENTER | Age: 72
End: 2022-08-08
Payer: COMMERCIAL

## 2022-08-08 ENCOUNTER — OFFICE VISIT (OUTPATIENT)
Dept: URGENT CARE | Facility: MEDICAL CENTER | Age: 72
End: 2022-08-08
Payer: COMMERCIAL

## 2022-08-08 VITALS
BODY MASS INDEX: 33.86 KG/M2 | DIASTOLIC BLOOD PRESSURE: 64 MMHG | OXYGEN SATURATION: 92 % | SYSTOLIC BLOOD PRESSURE: 141 MMHG | HEART RATE: 104 BPM | WEIGHT: 184 LBS | RESPIRATION RATE: 18 BRPM | TEMPERATURE: 97.4 F | HEIGHT: 62 IN

## 2022-08-08 DIAGNOSIS — S13.9XXA NECK SPRAIN, INITIAL ENCOUNTER: Primary | ICD-10-CM

## 2022-08-08 DIAGNOSIS — S13.9XXA NECK SPRAIN, INITIAL ENCOUNTER: ICD-10-CM

## 2022-08-08 PROCEDURE — 99213 OFFICE O/P EST LOW 20 MIN: CPT | Performed by: PHYSICIAN ASSISTANT

## 2022-08-08 PROCEDURE — 72040 X-RAY EXAM NECK SPINE 2-3 VW: CPT

## 2022-08-08 PROCEDURE — S9083 URGENT CARE CENTER GLOBAL: HCPCS | Performed by: PHYSICIAN ASSISTANT

## 2022-08-08 RX ORDER — CETIRIZINE HYDROCHLORIDE 10 MG/1
10 TABLET ORAL DAILY
COMMUNITY

## 2022-08-08 RX ORDER — METHOCARBAMOL 500 MG/1
500 TABLET, FILM COATED ORAL 4 TIMES DAILY
Qty: 20 TABLET | Refills: 0 | Status: SHIPPED | OUTPATIENT
Start: 2022-08-08 | End: 2022-08-18 | Stop reason: SDUPTHER

## 2022-08-08 NOTE — PATIENT INSTRUCTIONS
Cervical sprain  Robaxin as directed-may become drowsy   Over-the-counter pain medication  Follow up with PCP in 3-5 days  Proceed to  ER if symptoms worsen

## 2022-08-08 NOTE — PROGRESS NOTES
[facility]        NAME: Laureano Llanes is a 73 y o  female  : 1950    MRN: [id number]  DATE: 2022  TIME: 10:41 AM    Assessment and Plan   Neck sprain, initial encounter [S13  9XXA]  1  Neck sprain, initial encounter  XR spine cervical 2 or 3 vw injury         Patient Instructions     Cervical sprain  Robaxin as directed-may become drowsy   Over-the-counter pain medication  Follow up with PCP in 3-5 days  Proceed to  ER if symptoms worsen  Chief Complaint   No chief complaint on file  History of Present Illness       See 7year-old female who presents complaining of neck pain x1 week that has progressively worsened  Patient denies trauma, fevers, chills, photosensitivity, sore throat,      Review of Systems   Review of Systems   Constitutional: Negative  HENT: Negative  Eyes: Negative  Respiratory: Negative  Negative for cough, chest tightness, shortness of breath, wheezing and stridor  Cardiovascular: Negative  Negative for chest pain, palpitations and leg swelling  Musculoskeletal: Positive for neck pain           Current Medications       Current Outpatient Medications:     albuterol (2 5 mg/3 mL) 0 083 % nebulizer solution, Take 1 vial (2 5 mg total) by nebulization every 4 (four) hours as needed for wheezing or shortness of breath, Disp: 60 vial, Rfl: 5    albuterol (2 5 mg/3 mL) 0 083 % nebulizer solution, Take 3 mL (2 5 mg total) by nebulization every 6 (six) hours as needed for wheezing or shortness of breath, Disp: 360 mL, Rfl: 0    amLODIPine (NORVASC) 10 mg tablet, Take 1 tablet (10 mg total) by mouth daily, Disp: 90 tablet, Rfl: 1    ascorbic acid (VITAMIN C) 500 MG tablet, Take 500 mg by mouth daily, Disp: , Rfl:     atorvastatin (LIPITOR) 40 mg tablet, Take 1 tablet (40 mg total) by mouth daily, Disp: 90 tablet, Rfl: 1    B Complex-C (SUPER B COMPLEX/VITAMIN C PO), Take by mouth, Disp: , Rfl:     Calcium Carbonate-Vitamin D 600-400 MG-UNIT per chew tablet, Chew 1 tablet 2 (two) times a day for 90 days, Disp: 180 tablet, Rfl: 0    cetirizine (ZyrTEC) 10 mg tablet, Take 1 tablet (10 mg total) by mouth daily for 90 days, Disp: 90 tablet, Rfl: 1    Cranberry 450 MG TABS, Take by mouth, Disp: , Rfl:     Cyanocobalamin (VITAMIN B-12 PO), Take by mouth, Disp: , Rfl:     cyclobenzaprine (FLEXERIL) 10 mg tablet, Take 1 tablet (10 mg total) by mouth daily at bedtime as needed for muscle spasms, Disp: 30 tablet, Rfl: 1    docusate sodium (COLACE) 100 mg capsule, Take 100 mg by mouth, Disp: , Rfl:     fluticasone-salmeterol (ADVAIR DISKUS) 250-50 mcg/dose inhaler, Inhale 1 puff 2 (two) times a day (Patient not taking: Reported on 11/17/2021 ), Disp: 3 Inhaler, Rfl: 1    gabapentin (NEURONTIN) 300 mg capsule, Take 1 capsule (300 mg total) by mouth 3 (three) times a day Please change from bid to tid, Disp: 270 capsule, Rfl: 1    metFORMIN (GLUCOPHAGE-XR) 500 mg 24 hr tablet, Take 1 tablet (500 mg total) by mouth daily with dinner, Disp: 90 tablet, Rfl: 1    Multiple Vitamins-Minerals (MULTIVITAMIN ADULT PO), Take by mouth, Disp: , Rfl:     pantoprazole (PROTONIX) 40 mg tablet, Take 1 tablet (40 mg total) by mouth daily, Disp: 90 tablet, Rfl: 1    telmisartan (MICARDIS) 40 mg tablet, Take 1 tablet (40 mg total) by mouth daily, Disp: 90 tablet, Rfl: 1    TURMERIC PO, Take by mouth, Disp: , Rfl:     Current Allergies     Allergies as of 08/08/2022 - Reviewed 04/19/2022   Allergen Reaction Noted    Sulfa antibiotics Rash             The following portions of the patient's history were reviewed and updated as appropriate: allergies, current medications, past family history, past medical history, past social history, past surgical history and problem list      Past Medical History:   Diagnosis Date    Asthma     Back pain     [facility]     [facility]     DVT (deep venous thrombosis) (HCC)     GERD (gastroesophageal reflux disease)     Hyperlipidemia     Hypertension        Past Surgical History:   Procedure Laterality Date    APPENDECTOMY      BREAST BIOPSY      BREAST CYST EXCISION Left 2010     SECTION      COLONOSCOPY      ESOPHAGOGASTRODUODENOSCOPY      HYSTERECTOMY      OOPHORECTOMY Bilateral        Family History   Problem Relation Age of Onset    Colon cancer Mother 43    Esophageal cancer Father 64    Skin cancer Sister     No Known Problems Daughter     No Known Problems Maternal Grandmother     No Known Problems Maternal Grandfather     No Known Problems Paternal Grandmother     No Known Problems Paternal Grandfather     No Known Problems Son          Medications have been verified  Objective   There were no vitals taken for this visit  Physical Exam     Physical Exam  Constitutional:       Appearance: Normal appearance  She is well-developed  HENT:      Head: Normocephalic and atraumatic  Right Ear: External ear normal       Left Ear: External ear normal       Nose: Nose normal       Mouth/Throat:      Pharynx: No oropharyngeal exudate  Cardiovascular:      Rate and Rhythm: Normal rate and regular rhythm  Heart sounds: Normal heart sounds  Pulmonary:      Effort: Pulmonary effort is normal  No respiratory distress  Breath sounds: Normal breath sounds  No wheezing or rales  Chest:      Chest wall: No tenderness  Musculoskeletal:      Cervical back: Neck supple  Spasms, torticollis and tenderness present  No swelling, edema, deformity, erythema, signs of trauma, lacerations, rigidity, bony tenderness or crepitus  Pain with movement present  Decreased range of motion  Thoracic back: Normal       Lumbar back: Normal    Lymphadenopathy:      Cervical: No cervical adenopathy  Neurological:      Mental Status: She is alert

## 2022-08-11 ENCOUNTER — RA CDI HCC (OUTPATIENT)
Dept: OTHER | Facility: HOSPITAL | Age: 72
End: 2022-08-11

## 2022-08-11 NOTE — PROGRESS NOTES
[facility]  coding opportunities       Chart reviewed, no opportunity found:   [address]        Patients Insurance     Medicare Insurance: [employer]age

## 2022-08-18 ENCOUNTER — OFFICE VISIT (OUTPATIENT)
Dept: FAMILY MEDICINE CLINIC | Facility: CLINIC | Age: 72
End: 2022-08-18
Payer: COMMERCIAL

## 2022-08-18 VITALS
TEMPERATURE: 97.3 F | HEART RATE: 89 BPM | OXYGEN SATURATION: 92 % | DIASTOLIC BLOOD PRESSURE: 62 MMHG | RESPIRATION RATE: 16 BRPM | BODY MASS INDEX: 33.61 KG/M2 | HEIGHT: 61 IN | SYSTOLIC BLOOD PRESSURE: 124 MMHG | WEIGHT: 178 LBS

## 2022-08-18 DIAGNOSIS — S13.9XXA NECK SPRAIN, INITIAL ENCOUNTER: ICD-10-CM

## 2022-08-18 DIAGNOSIS — N18.30 TYPE 2 DIABETES MELLITUS WITH STAGE 3 CHRONIC KIDNEY DISEASE, WITHOUT LONG-TERM CURRENT USE OF INSULIN (HCC): ICD-10-CM

## 2022-08-18 DIAGNOSIS — N18.30 TYPE 2 DIABETES MELLITUS WITH STAGE 3 CHRONIC KIDNEY DISEASE, WITHOUT LONG-TERM CURRENT USE OF INSULIN, UNSPECIFIED WHETHER STAGE 3A OR 3B CKD (HCC): ICD-10-CM

## 2022-08-18 DIAGNOSIS — E78.5 HYPERLIPIDEMIA, UNSPECIFIED HYPERLIPIDEMIA TYPE: ICD-10-CM

## 2022-08-18 DIAGNOSIS — R10.13 DYSPEPSIA: ICD-10-CM

## 2022-08-18 DIAGNOSIS — I10 ESSENTIAL HYPERTENSION: ICD-10-CM

## 2022-08-18 DIAGNOSIS — Z00.00 MEDICARE ANNUAL WELLNESS VISIT, SUBSEQUENT: Primary | ICD-10-CM

## 2022-08-18 DIAGNOSIS — E11.22 TYPE 2 DIABETES MELLITUS WITH STAGE 3 CHRONIC KIDNEY DISEASE, WITHOUT LONG-TERM CURRENT USE OF INSULIN (HCC): ICD-10-CM

## 2022-08-18 DIAGNOSIS — E11.22 TYPE 2 DIABETES MELLITUS WITH STAGE 3 CHRONIC KIDNEY DISEASE, WITHOUT LONG-TERM CURRENT USE OF INSULIN, UNSPECIFIED WHETHER STAGE 3A OR 3B CKD (HCC): ICD-10-CM

## 2022-08-18 LAB — SL AMB POCT HEMOGLOBIN AIC: 6.5 (ref ?–6.5)

## 2022-08-18 PROCEDURE — 3066F NEPHROPATHY DOC TX: CPT | Performed by: INTERNAL MEDICINE

## 2022-08-18 PROCEDURE — 1090F PRES/ABSN URINE INCON ASSESS: CPT | Performed by: INTERNAL MEDICINE

## 2022-08-18 PROCEDURE — 3288F FALL RISK ASSESSMENT DOCD: CPT | Performed by: INTERNAL MEDICINE

## 2022-08-18 PROCEDURE — 1170F FXNL STATUS ASSESSED: CPT | Performed by: INTERNAL MEDICINE

## 2022-08-18 PROCEDURE — 1160F RVW MEDS BY RX/DR IN RCRD: CPT | Performed by: INTERNAL MEDICINE

## 2022-08-18 PROCEDURE — G0439 PPPS, SUBSEQ VISIT: HCPCS | Performed by: INTERNAL MEDICINE

## 2022-08-18 PROCEDURE — 3725F SCREEN DEPRESSION PERFORMED: CPT | Performed by: INTERNAL MEDICINE

## 2022-08-18 PROCEDURE — 99214 OFFICE O/P EST MOD 30 MIN: CPT | Performed by: INTERNAL MEDICINE

## 2022-08-18 PROCEDURE — 1125F AMNT PAIN NOTED PAIN PRSNT: CPT | Performed by: INTERNAL MEDICINE

## 2022-08-18 PROCEDURE — 83036 HEMOGLOBIN GLYCOSYLATED A1C: CPT | Performed by: INTERNAL MEDICINE

## 2022-08-18 RX ORDER — METHOCARBAMOL 500 MG/1
500 TABLET, FILM COATED ORAL 4 TIMES DAILY
Qty: 30 TABLET | Refills: 0 | Status: SHIPPED | OUTPATIENT
Start: 2022-08-18 | End: 2022-09-06 | Stop reason: SDUPTHER

## 2022-08-18 RX ORDER — PANTOPRAZOLE SODIUM 40 MG/1
TABLET, DELAYED RELEASE ORAL
Qty: 90 TABLET | Refills: 1 | Status: SHIPPED | OUTPATIENT
Start: 2022-08-18

## 2022-08-18 RX ORDER — AMLODIPINE BESYLATE 10 MG/1
TABLET ORAL
Qty: 90 TABLET | Refills: 1 | Status: SHIPPED | OUTPATIENT
Start: 2022-08-18

## 2022-08-18 RX ORDER — METFORMIN HYDROCHLORIDE 500 MG/1
TABLET, EXTENDED RELEASE ORAL
Qty: 90 TABLET | Refills: 1 | Status: SHIPPED | OUTPATIENT
Start: 2022-08-18

## 2022-08-18 NOTE — PROGRESS NOTES
Depression Screening and Follow-up Plan: Patient was screened for depression during today's encounter  They screened negative with a PHQ-2 score of 0  Tobacco Cessation Counseling: The patient is sincerely urged to quit consumption of tobacco  She is not ready to quit tobacco  Patient refused medication  Assessment/Plan:         Diagnoses and all orders for this visit:    Medicare annual wellness visit, subsequent    Type 2 diabetes mellitus with stage 3 chronic kidney disease, without long-term current use of insulin, unspecified whether stage 3a or 3b CKD ([facility] )  -     POCT hemoglobin A1c  -     Comprehensive metabolic panel; Future    Neck sprain, initial encounter  Comments:  methocarbol  Orders:  -     methocarbamol (ROBAXIN) 500 mg tablet; Take 1 tablet (500 mg total) by mouth 4 (four) times a day for 5 days    Essential hypertension  Comments:  on arb    Hyperlipidemia, unspecified hyperlipidemia type  Comments:  on statin  Orders:  -     Lipid panel; Future  -     Comprehensive metabolic panel; Future          Subjective:      Patient ID: Naif Matthews is a 73 y o  female  On statin and arb  Denies retinopathy, +neuropathy +ckd-3  Discussed her neck hurts  Will continue methobarbomol  Discussed can get her 2nd covid booster  The following portions of the patient's history were reviewed and updated as appropriate: She  has a past medical history of Asthma, Back pain, COPD (chronic obstructive pulmonary disease) ([facility] ), COPD (chronic obstructive pulmonary disease) ([facility] ), D[facility]deep venous thrombosis) ([facility] ), GERD (gastroesophageal reflux disease), Hyperlipidemia, and Hypertension    She   Patient Active Problem List    Diagnosis Date Noted    Type 2 diabetes mellitus with stage 3 chronic kidney disease, without long-term current use of insulin ([facility] ) 07/24/2020    Type 2 diabetes mellitus with diabetic neuropathy, without long-term current use of insulin ([facility] ) 04/21/2020    Snoring     Daytime sleepiness     Hyperlipidemia 2019    COPD (chronic obstructive pulmonary disease) ([facility] ) 2019    Essential hypertension 2019     She  has a past surgical history that includes  section; Colonoscopy; Appendectomy; Esophagogastroduodenoscopy; Breast biopsy; Breast cyst excision (Left, 2010); Hysterectomy; and Oophorectomy (Bilateral)  Her family history includes Colon cancer (age of onset: 43) in her mother; Esophageal cancer (age of onset: 64) in her father; No Known Problems in her daughter, maternal grandfather, maternal grandmother, paternal grandfather, paternal grandmother, and son; Skin cancer in her sister  She  reports that she has been smoking cigarettes  She has a 54 00 pack-year smoking history  She has never used smokeless tobacco  She reports that she does not drink alcohol and does not use drugs    Current Outpatient Medications   Medication Sig Dispense Refill    albuterol (2 5 mg/3 mL) 0 083 % nebulizer solution Take 3 mL (2 5 mg total) by nebulization every 6 (six) hours as needed for wheezing or shortness of breath 360 mL 0    ascorbic acid (VITAMIN C) 500 MG tablet Take 500 mg by mouth daily      atorvastatin (LIPITOR) 40 mg tablet Take 1 tablet (40 mg total) by mouth daily 90 tablet 1    B Complex-C (SUPER B COMPLEX/VITAMIN C PO) Take by mouth      cetirizine (ZyrTEC) 10 mg tablet Take 10 mg by mouth daily      Cranberry 450 MG TABS Take by mouth      Cyanocobalamin (VITAMIN B-12 PO) Take by mouth      fluticasone-salmeterol (ADVAIR DISKUS) 250-50 mcg/dose inhaler Inhale 1 puff 2 (two) times a day 3 Inhaler 1    gabapentin (NEURONTIN) 300 mg capsule Take 1 capsule (300 mg total) by mouth 3 (three) times a day Please change from bid to tid 270 capsule 1    methocarbamol (ROBAXIN) 500 mg tablet Take 1 tablet (500 mg total) by mouth 4 (four) times a day for 5 days 30 tablet 0    Multiple Vitamins-Minerals (MULTIVITAMIN ADULT PO) Take by mouth      telmisartan (MICARDIS) 40 mg tablet Take 1 tablet (40 mg total) by mouth daily 90 tablet 1    TURMERIC PO Take by mouth      albuterol (2 5 mg/3 mL) 0 083 % nebulizer solution Take 1 vial (2 5 mg total) by nebulization every 4 (four) hours as needed for wheezing or shortness of breath 60 vial 5    amLODIPine (NORVASC) 10 mg tablet TAKE 1 TABLET BY MOUTH EVERY DAY 90 tablet 1    Calcium Carbonate-Vitamin D 600-400 MG-UNIT per chew tablet Chew 1 tablet 2 (two) times a day for 90 days 180 tablet 0    cetirizine (ZyrTEC) 10 mg tablet Take 1 tablet (10 mg total) by mouth daily for 90 days 90 tablet 1    metFORMIN (GLUCOPHAGE-XR) 500 mg 24 hr tablet TAKE 1 TABLET BY MOUTH EVERY DAY WITH DINNER 90 tablet 1    pantoprazole (PROTONIX) 40 mg tablet TAKE 1 TABLET BY MOUTH EVERY DAY 90 tablet 1     No current facility-administered medications for this visit       Current Outpatient Medications on File Prior to Visit   Medication Sig    albuterol (2 5 mg/3 mL) 0 083 % nebulizer solution Take 3 mL (2 5 mg total) by nebulization every 6 (six) hours as needed for wheezing or shortness of breath    ascorbic acid (VITAMIN C) 500 MG tablet Take 500 mg by mouth daily    atorvastatin (LIPITOR) 40 mg tablet Take 1 tablet (40 mg total) by mouth daily    B Complex-C (SUPER B COMPLEX/VITAMIN C PO) Take by mouth    cetirizine (ZyrTEC) 10 mg tablet Take 10 mg by mouth daily    Cranberry 450 MG TABS Take by mouth    Cyanocobalamin (VITAMIN B-12 PO) Take by mouth    fluticasone-salmeterol (ADVAIR DISKUS) 250-50 mcg/dose inhaler Inhale 1 puff 2 (two) times a day    gabapentin (NEURONTIN) 300 mg capsule Take 1 capsule (300 mg total) by mouth 3 (three) times a day Please change from bid to tid    Multiple Vitamins-Minerals (MULTIVITAMIN ADULT PO) Take by mouth    telmisartan (MICARDIS) 40 mg tablet Take 1 tablet (40 mg total) by mouth daily    TURMERIC PO Take by mouth    albuterol (2 5 mg/3 mL) 0 083 % nebulizer solution Take 1 vial (2 5 mg total) by nebulization every 4 (four) hours as needed for wheezing or shortness of breath    Calcium Carbonate-Vitamin D 600-400 MG-UNIT per chew tablet Chew 1 tablet 2 (two) times a day for 90 days    cetirizine (ZyrTEC) 10 mg tablet Take 1 tablet (10 mg total) by mouth daily for 90 days     No current facility-administered medications on file prior to visit  She is allergic to sulfa antibiotics       Review of Systems   Constitutional: Negative for chills and fever  HENT: Negative  Eyes: Negative for visual disturbance  Respiratory: Negative  Cardiovascular: Negative  Neurological: Positive for numbness  Objective:      /62 (BP Location: Left arm, Patient Position: Sitting, Cuff Size: Large)   Pulse 89   Temp (!) 97 3 °F (36 3 °C) (Temporal)   Resp 16   Ht 5' 0 75" (1 543 m)   Wt 80 7 kg (178 lb)   SpO2 92%   BMI 33 91 kg/m²          Physical Exam  Constitutional:       Appearance: She is obese  HENT:      Head: Normocephalic and atraumatic  Right Ear: Tympanic membrane and ear canal normal       Left Ear: Tympanic membrane and ear canal normal    Cardiovascular:      Rate and Rhythm: Normal rate and regular rhythm  Pulses: no weak pulses          Dorsalis pedis pulses are 2+ on the right side and 2+ on the left side  Pulmonary:      Effort: Pulmonary effort is normal       Breath sounds: Normal breath sounds  Musculoskeletal:      Cervical back: Neck supple  Feet:      Right foot:      Skin integrity: Warmth present  No erythema  Left foot:      Skin integrity: Warmth present  No erythema  Lymphadenopathy:      Cervical: No cervical adenopathy  Neurological:      Mental Status: She is alert  Diabetic Foot Exam    Patient's shoes and socks removed  Right Foot/Ankle   Right Foot Inspection  Skin Exam: warmth  No erythema  Sensory   Vibration: intact  Monofilament testing: intact    Vascular  The right DP pulse is 2+  Left Foot/Ankle  Left Foot Inspection  Skin Exam: warmth  No erythema  Sensory   Vibration: intact  Monofilament testing: intact    Vascular  The left DP pulse is 2+       Assign Risk Category  No deformity present  No loss of protective sensation  No weak pulses  Risk: 0

## 2022-08-18 NOTE — PATIENT INSTRUCTIONS
Medicare Preventive Visit Patient Instructions  Thank you for completing your Welcome to Medicare Visit or Medicare Annual Wellness Visit today  Your next wellness visit will be due in one year (8/19/2023)  The screening/preventive services that you may require over the next 5-10 years are detailed below  Some tests may not apply to you based off risk factors and/or age  Screening tests ordered at today's visit but not completed yet may show as past due  Also, please note that scanned in results may not display below  Preventive Screenings:  Service Recommendations Previous Testing/Comments   Colorectal Cancer Screening  * Colonoscopy    * Fecal Occult Blood Test (FOBT)/Fecal Immunochemical Test (FIT)  * Fecal DNA/Cologuard Test  * Flexible Sigmoidoscopy Age: 39-70 years old   Colonoscopy: every 10 years (may be performed more frequently if at higher risk)  OR  FOBT/FIT: every 1 year  OR  Cologuard: every 3 years  OR  Sigmoidoscopy: every 5 years  Screening may be recommended earlier than age 39 if at higher risk for colorectal cancer  Also, an individualized decision between you and your healthcare provider will decide whether screening between the ages of 74-80 would be appropriate  Colonoscopy: 06/28/2016  FOBT/FIT: Not on file  Cologuard: Not on file  Sigmoidoscopy: Not on file    Screening Current     Breast Cancer Screening Age: 36 years old  Frequency: every 1-2 years  Not required if history of left and right mastectomy Mammogram: 12/21/2021    Screening Current   Cervical Cancer Screening Between the ages of 21-29, pap smear recommended once every 3 years  Between the ages of 33-67, can perform pap smear with HPV co-testing every 5 years     Recommendations may differ for women with a history of total hysterectomy, cervical cancer, or abnormal pap smears in past  Pap Smear: Not on file    Screening Not Indicated   Hepatitis C Screening Once for adults born between 1945 and 1965  More frequently in patients at high risk for Hepatitis C Hep C Antibody: Not on file        Diabetes Screening 1-2 times per year if you're at risk for diabetes or have pre-diabetes Fasting glucose: 153 mg/dL (2/9/2022)  A1C: 6 7 % (2/9/2022)  Screening Not Indicated  History Diabetes   Cholesterol Screening Once every 5 years if you don't have a lipid disorder  May order more often based on risk factors  Lipid panel: 02/09/2022    Screening Not Indicated  History Lipid Disorder     Other Preventive Screenings Covered by Medicare:  1  Abdominal Aortic Aneurysm (AAA) Screening: covered once if your at risk  You're considered to be at risk if you have a family history of AAA  2  Lung Cancer Screening: covers low dose CT scan once per year if you meet all of the following conditions: (1) Age 50-69; (2) No signs or symptoms of lung cancer; (3) Current smoker or have quit smoking within the last 15 years; (4) You have a tobacco smoking history of at least 20 pack years (packs per day multiplied by number of years you smoked); (5) You get a written order from a healthcare provider  3  Glaucoma Screening: covered annually if you're considered high risk: (1) You have diabetes OR (2) Family history of glaucoma OR (3)  aged 48 and older OR (3)  American aged 72 and older  3  Osteoporosis Screening: covered every 2 years if you meet one of the following conditions: (1) You're estrogen deficient and at risk for osteoporosis based off medical history and other findings; (2) Have a vertebral abnormality; (3) On glucocorticoid therapy for more than 3 months; (4) Have primary hyperparathyroidism; (5) On osteoporosis medications and need to assess response to drug therapy  · Last bone density test (DXA Scan): 05/22/2019   5  HIV Screening: covered annually if you're between the age of 15-65  Also covered annually if you are younger than 13 and older than 72 with risk factors for HIV infection   For pregnant patients, it is covered up to 3 times per pregnancy  Immunizations:  Immunization Recommendations   Influenza Vaccine Annual influenza vaccination during flu season is recommended for all persons aged >= 6 months who do not have contraindications   Pneumococcal Vaccine   * Pneumococcal conjugate vaccine = PCV13 (Prevnar 13), PCV15 (Vaxneuvance), PCV20 (Prevnar 20)  * Pneumococcal polysaccharide vaccine = PPSV23 (Pneumovax) Adults 25-60 years old: 1-3 doses may be recommended based on certain risk factors  Adults 72 years old: 1-2 doses may be recommended based off what pneumonia vaccine you previously received   Hepatitis B Vaccine 3 dose series if at intermediate or high risk (ex: diabetes, end stage renal disease, liver disease)   Tetanus (Td) Vaccine - COST NOT COVERED BY MEDICARE PART B Following completion of primary series, a booster dose should be given every 10 years to maintain immunity against tetanus  Td may also be given as tetanus wound prophylaxis  Tdap Vaccine - COST NOT COVERED BY MEDICARE PART B Recommended at least once for all adults  For pregnant patients, recommended with each pregnancy  Shingles Vaccine (Shingrix) - COST NOT COVERED BY MEDICARE PART B  2 shot series recommended in those aged 48 and above     Health Maintenance Due:      Topic Date Due    Hepatitis C Screening  Never done    Lung Cancer Screening  Never done    Breast Cancer Screening: Mammogram  12/21/2022    Colorectal Cancer Screening  06/28/2026     Immunizations Due:      Topic Date Due    COVID-19 Vaccine (4 - Booster for Moderna series) 02/25/2022    Influenza Vaccine (1) 09/01/2022     Advance Directives   What are advance directives? Advance directives are legal documents that state your wishes and plans for medical care  These plans are made ahead of time in case you lose your ability to make decisions for yourself   Advance directives can apply to any medical decision, such as the treatments you want, and if you want to donate organs  What are the types of advance directives? There are many types of advance directives, and each state has rules about how to use them  You may choose a combination of any of the following:  · Living will: This is a written record of the treatment you want  You can also choose which treatments you do not want, which to limit, and which to stop at a certain time  This includes surgery, medicine, IV fluid, and tube feedings  · Durable power of  for healthcare [facility]): This is a written record that states who you want to make healthcare choices for you when you are unable to make them for yourself  This person, called a proxy, is usually a family member or a friend  You may choose more than 1 proxy  · Do not resuscitate (DNR) order:  A DNR order is used in case your heart stops beating or you stop breathing  It is a request not to have certain forms of treatment, such as CPR  A DNR order may be included in other types of advance directives  · Medical directive: This covers the care that you want if you are in a coma, near death, or unable to make decisions for yourself  You can list the treatments you want for each condition  Treatment may include pain medicine, surgery, blood transfusions, dialysis, IV or tube feedings, and a ventilator (breathing machine)  · Values history: This document has questions about your views, beliefs, and how you feel and think about life  This information can help others choose the care that you would choose  Why are advance directives important? An advance directive helps you control your care  Although spoken wishes may be used, it is better to have your wishes written down  Spoken wishes can be misunderstood, or not followed  Treatments may be given even if you do not want them  An advance directive may make it easier for your family to make difficult choices about your care     Cigarette Smoking and Your Health   Risks to your health if you smoke: Nicotine and other chemicals found in tobacco damage every cell in your body  Even if you are a light smoker, you have an increased risk for cancer, heart disease, and lung disease  If you are pregnant or have diabetes, smoking increases your risk for complications  Benefits to your health if you stop smoking:   · You decrease respiratory symptoms such as coughing, wheezing, and shortness of breath  · You reduce your risk for cancers of the lung, mouth, throat, kidney, bladder, pancreas, stomach, and cervix  If you already have cancer, you increase the benefits of chemotherapy  You also reduce your risk for cancer returning or a second cancer from developing  · You reduce your risk for heart disease, blood clots, heart attack, and stroke  · You reduce your risk for lung infections, and diseases such as pneumonia, asthma, chronic bronchitis, and emphysema  · Your circulation improves  More oxygen can be delivered to your body  If you have diabetes, you lower your risk for complications, such as kidney, artery, and eye diseases  You also lower your risk for nerve damage  Nerve damage can lead to amputations, poor vision, and blindness  · You improve your body's ability to heal and to fight infections  For more information and support to stop smoking:   · [employer]  Phone: 4- [phone]  Web Address: www [employer]  Weight Management   Why it is important to manage your weight:  Being overweight increases your risk of health conditions such as heart disease, high blood pressure, type 2 diabetes, and certain types of cancer  It can also increase your risk for osteoarthritis, sleep apnea, and other respiratory problems  Aim for a slow, steady weight loss  Even a small amount of weight loss can lower your risk of health problems  How to lose weight safely:  A safe and healthy way to lose weight is to eat fewer calories and get regular exercise   You can lose up about 1 pound a week by decreasing the number of calories you eat by 500 calories each day  Healthy meal plan for weight management:  A healthy meal plan includes a variety of foods, contains fewer calories, and helps you stay healthy  A healthy meal plan includes the following:  · Eat whole-grain foods more often  A healthy meal plan should contain fiber  Fiber is the part of grains, fruits, and vegetables that is not broken down by your body  Whole-grain foods are healthy and provide extra fiber in your diet  Some examples of whole-grain foods are whole-wheat breads and pastas, oatmeal, brown rice, and bulgur  · Eat a variety of vegetables every day  Include dark, leafy greens such as spinach, kale, angel greens, and mustard greens  Eat yellow and orange vegetables such as carrots, sweet potatoes, and winter squash  · Eat a variety of fruits every day  Choose fresh or canned fruit (canned in its own juice or light syrup) instead of juice  Fruit juice has very little or no fiber  · Eat low-fat dairy foods  Drink fat-free (skim) milk or 1% milk  Eat fat-free yogurt and low-fat cottage cheese  Try low-fat cheeses such as mozzarella and other reduced-fat cheeses  · Choose meat and other protein foods that are low in fat  Choose beans or other legumes such as split peas or lentils  Choose fish, skinless poultry (chicken or turkey), or lean cuts of red meat (beef or pork)  Before you cook meat or poultry, cut off any visible fat  · Use less fat and oil  Try baking foods instead of frying them  Add less fat, such as margarine, sour cream, regular salad dressing and mayonnaise to foods  Eat fewer high-fat foods  Some examples of high-fat foods include french fries, doughnuts, ice cream, and cakes  · Eat fewer sweets  Limit foods and drinks that are high in sugar  This includes candy, cookies, regular soda, and sweetened drinks  Exercise:  Exercise at least 30 minutes per day on most days of the week   Some examples of exercise include walking, biking, dancing, and swimming  You can also fit in more physical activity by taking the stairs instead of the elevator or parking farther away from stores  Ask your healthcare provider about the best exercise plan for you  © Copyright [employer] 2018 Information is for End User's use only and may not be sold, redistributed or otherwise used for commercial purposes   All illustrations and images included in CareNotes® are the copyrighted property of [employer]  or [address] [place] [address]

## 2022-08-18 NOTE — PROGRESS NOTES
Assessment and Plan:     Problem List Items Addressed This Visit        Endocrine    Type 2 diabetes mellitus with stage 3 chronic kidney disease, without long-term current use of insulin ([facility] )          Depression Screening and Follow-up Plan: Patient was screened for depression during today's encounter  They screened negative with a PHQ-2 score of 0  Tobacco Cessation Counseling: The patient is sincerely urged to quit consumption of tobacco  She is not ready to quit tobacco  Patient refused medication  Preventive health issues were discussed with patient, and age appropriate screening tests were ordered as noted in patient's After Visit Summary  Personalized health advice and appropriate referrals for health education or preventive services given if needed, as noted in patient's After Visit Summary  History of Present Illness:     Patient presents for a Medicare Wellness Visit    Pt went to urgent care with neck pain  rx methocarbamol  Will be seeing ortho soon  Will refill med  Patient Care Team:  Sara May DO as PCP - General (Internal Medicine)     Review of Systems:     Review of Systems   Constitutional: Negative  Negative for chills and fever  HENT: Negative  Respiratory: Negative  Cardiovascular: Negative           Problem List:     Patient Active Problem List   Diagnosis    Essential hypertension    Hyperlipidemia    COPD (chronic obstructive pulmonary disease) (HCC)    Snoring    Daytime sleepiness    Type 2 diabetes mellitus with diabetic neuropathy, without long-term current use of insulin (HCC)    Type 2 diabetes mellitus with stage 3 chronic kidney disease, without long-term current use of insulin ([facility] )      Past Medical and Surgical History:     Past Medical History:   Diagnosis Date    Asthma     Back pain     COPD (chronic obstructive pulmonary disease) ([facility] )     COPD (chronic obstructive pulmonary disease) ([facility] )     D[facility]deep venous thrombosis) ([facility] )  GERD (gastroesophageal reflux disease)     Hyperlipidemia     Hypertension      Past Surgical History:   Procedure Laterality Date    APPENDECTOMY      BREAST BIOPSY      BREAST CYST EXCISION Left 2010     SECTION      COLONOSCOPY      ESOPHAGOGASTRODUODENOSCOPY      HYSTERECTOMY      OOPHORECTOMY Bilateral       Family History:     Family History   Problem Relation Age of Onset    Colon cancer Mother 43    Esophageal cancer Father 64    Skin cancer Sister     No Known Problems Daughter     No Known Problems Maternal Grandmother     No Known Problems Maternal Grandfather     No Known Problems Paternal Grandmother     No Known Problems Paternal Grandfather     No Known Problems Son       Social History:     Social History     Socioeconomic History    Marital status: Single     Spouse name: None    Number of children: None    Years of education: None    Highest education level: None   Occupational History    None   Tobacco Use    Smoking status: Current Every Day Smoker     Packs/day: 1 00     Years: 54 00     Pack years: 54 00     Types: Cigarettes    Smokeless tobacco: Never Used   Vaping Use    Vaping Use: Never used   Substance and Sexual Activity    Alcohol use: No    Drug use: No    Sexual activity: None   Other Topics Concern    None   Social History Narrative    None     Social Determinants of Health     Financial Resource Strain: Not on file   Food Insecurity: Not on file   Transportation Needs: Not on file   Physical Activity: Not on file   Stress: Not on file   Social Connections: Not on file   Intimate Partner Violence: Not on file   Housing Stability: Not on file      Medications and Allergies:     Current Outpatient Medications   Medication Sig Dispense Refill    albuterol (2 5 mg/3 mL) 0 083 % nebulizer solution Take 3 mL (2 5 mg total) by nebulization every 6 (six) hours as needed for wheezing or shortness of breath 360 mL 0    amLODIPine (NORVASC) 10 mg tablet Take 1 tablet (10 mg total) by mouth daily 90 tablet 1    ascorbic acid (VITAMIN C) 500 MG tablet Take 500 mg by mouth daily      atorvastatin (LIPITOR) 40 mg tablet Take 1 tablet (40 mg total) by mouth daily 90 tablet 1    B Complex-C (SUPER B COMPLEX/VITAMIN C PO) Take by mouth      cetirizine (ZyrTEC) 10 mg tablet Take 10 mg by mouth daily      Cranberry 450 MG TABS Take by mouth      Cyanocobalamin (VITAMIN B-12 PO) Take by mouth      cyclobenzaprine (FLEXERIL) 10 mg tablet Take 1 tablet (10 mg total) by mouth daily at bedtime as needed for muscle spasms 30 tablet 1    fluticasone-salmeterol (ADVAIR DISKUS) 250-50 mcg/dose inhaler Inhale 1 puff 2 (two) times a day 3 Inhaler 1    gabapentin (NEURONTIN) 300 mg capsule Take 1 capsule (300 mg total) by mouth 3 (three) times a day Please change from bid to tid 270 capsule 1    metFORMIN (GLUCOPHAGE-XR) 500 mg 24 hr tablet Take 1 tablet (500 mg total) by mouth daily with dinner 90 tablet 1    methocarbamol (ROBAXIN) 500 mg tablet Take 1 tablet (500 mg total) by mouth 4 (four) times a day for 5 days 20 tablet 0    Multiple Vitamins-Minerals (MULTIVITAMIN ADULT PO) Take by mouth      pantoprazole (PROTONIX) 40 mg tablet Take 1 tablet (40 mg total) by mouth daily 90 tablet 1    telmisartan (MICARDIS) 40 mg tablet Take 1 tablet (40 mg total) by mouth daily 90 tablet 1    TURMERIC PO Take by mouth      albuterol (2 5 mg/3 mL) 0 083 % nebulizer solution Take 1 vial (2 5 mg total) by nebulization every 4 (four) hours as needed for wheezing or shortness of breath 60 vial 5    Calcium Carbonate-Vitamin D 600-400 MG-UNIT per chew tablet Chew 1 tablet 2 (two) times a day for 90 days 180 tablet 0    cetirizine (ZyrTEC) 10 mg tablet Take 1 tablet (10 mg total) by mouth daily for 90 days 90 tablet 1    docusate sodium (COLACE) 100 mg capsule Take 100 mg by mouth (Patient not taking: No sig reported)       No current facility-administered medications for this visit  Allergies   Allergen Reactions    Sulfa Antibiotics Rash      Immunizations:     Immunization History   Administered Date(s) Administered    COVID-19 MODERNA VACC 0 5 ML IM 03/18/2021, 04/15/2021, 10/25/2021    INFLUENZA 11/19/2018, 10/02/2021    Influenza Quadrivalent Preservative Free 3 years and older IM 11/08/2017    Influenza Split High Dose Preservative Free IM 11/19/2018    Influenza Whole 11/08/2012, 10/23/2013, 10/24/2014    Influenza, Quadrivalent (nasal) 11/08/2017    Influenza, high dose seasonal 0 7 mL 10/15/2019, 10/06/2020    Influenza, seasonal, injectable 11/21/2016    Influenza, seasonal, injectable, preservative free 11/02/2015    Pneumococcal Conjugate 13-Valent 04/11/2019    Pneumococcal Polysaccharide PPV23 05/06/2013, 11/05/2020    Tdap 08/10/2016    Zoster 06/02/2014    Zoster Vaccine Recombinant 11/17/2021      Health Maintenance:         Topic Date Due    Hepatitis C Screening  Never done    Lung Cancer Screening  Never done    Breast Cancer Screening: Mammogram  12/21/2022    Colorectal Cancer Screening  06/28/2026         Topic Date Due    COVID-19 Vaccine (4 - Booster for Ilya Means series) 02/25/2022    Influenza Vaccine (1) 09/01/2022      Medicare Screening Tests and Risk Assessments:     Eva Chambers is here for her Subsequent Wellness visit  Health Risk Assessment:   Patient rates overall health as good  Patient feels that their physical health rating is same  Patient is satisfied with their life  Eyesight was rated as same  Hearing was rated as same  Patient feels that their emotional and mental health rating is same  Patients states they are never, rarely angry  Patient states they are sometimes unusually tired/fatigued  Pain experienced in the last 7 days has been a lot  Patient states that she has experienced no weight loss or gain in last 6 months  Depression Screening:   PHQ-2 Score: 0      Fall Risk Screening:    In the past year, patient has experienced: no history of falling in past year      Urinary Incontinence Screening:   Patient has not leaked urine accidently in the last six months  Home Safety:  Patient does not have trouble with stairs inside or outside of their home  Patient has working smoke alarms and has working carbon monoxide detector  Nutrition:   Current diet is Regular  Medications:   Patient is currently taking over-the-counter supplements  OTC medications include: see medication list  Patient is able to manage medications  Activities of Daily Living (ADLs)/Instrumental Activities of Daily Living (IADLs):   Walk and transfer into and out of bed and chair?: Yes  Dress and groom yourself?: Yes    Bathe or shower yourself?: Yes    Feed yourself?  Yes  Do your laundry/housekeeping?: Yes  Manage your money, pay your bills and track your expenses?: Yes  Make your own meals?: Yes    Do your own shopping?: Yes    Previous Hospitalizations:   Any hospitalizations or ED visits within the last 12 months?: No      Advance Care Planning:     Advanced directive: No      Cognitive Screening:   Provider or family/friend/caregiver concerned regarding cognition?: No    PREVENTIVE SCREENINGS      Cardiovascular Screening:    General: Screening Not Indicated and History Lipid Disorder      Diabetes Screening:     General: Screening Not Indicated and History Diabetes      Colorectal Cancer Screening:     General: Screening Current      Breast Cancer Screening:     General: Screening Current      Cervical Cancer Screening:    General: Screening Not Indicated    Screening, Brief Intervention, and Referral to Treatment (SBIRT)    Screening    Typical number of drinks in a week: 0    Single Item Drug Screening:  How often have you used an illegal drug (including marijuana) or a prescription medication for non-medical reasons in the past year? never    Single Item Drug Screen Score: 0  Interpretation: Negative screen for possible drug use disorder    Brief Intervention  Alcohol & drug use screenings were reviewed  No concerns regarding substance use disorder identified       No exam data present     Physical Exam:     /62 (BP Location: Left arm, Patient Position: Sitting, Cuff Size: Large)   Pulse 89   Temp (!) 97 3 °F (36 3 °C) (Temporal)   Resp 16   Ht 5' 0 75" (1 543 m)   Wt 80 7 kg (178 lb)   SpO2 92%   BMI 33 91 kg/m²     Physical Exam     Marybel Rodriguez,

## 2022-08-29 ENCOUNTER — APPOINTMENT (OUTPATIENT)
Dept: LAB | Facility: MEDICAL CENTER | Age: 72
End: 2022-08-29
Payer: COMMERCIAL

## 2022-08-29 DIAGNOSIS — E11.22 TYPE 2 DIABETES MELLITUS WITH STAGE 3 CHRONIC KIDNEY DISEASE, WITHOUT LONG-TERM CURRENT USE OF INSULIN, UNSPECIFIED WHETHER STAGE 3A OR 3B CKD (HCC): ICD-10-CM

## 2022-08-29 DIAGNOSIS — E78.5 HYPERLIPIDEMIA, UNSPECIFIED HYPERLIPIDEMIA TYPE: ICD-10-CM

## 2022-08-29 DIAGNOSIS — N18.30 TYPE 2 DIABETES MELLITUS WITH STAGE 3 CHRONIC KIDNEY DISEASE, WITHOUT LONG-TERM CURRENT USE OF INSULIN, UNSPECIFIED WHETHER STAGE 3A OR 3B CKD (HCC): ICD-10-CM

## 2022-08-29 DIAGNOSIS — E11.40 TYPE 2 DIABETES MELLITUS WITH DIABETIC NEUROPATHY, WITHOUT LONG-TERM CURRENT USE OF INSULIN (HCC): ICD-10-CM

## 2022-08-29 LAB
ALBUMIN SERPL BCP-MCNC: 3.3 G/DL (ref 3.5–5)
ALP SERPL-CCNC: 62 U/L (ref 46–116)
ALT SERPL W P-5'-P-CCNC: 28 U/L (ref 12–78)
ANION GAP SERPL CALCULATED.3IONS-SCNC: 5 MMOL/L (ref 4–13)
AST SERPL W P-5'-P-CCNC: 16 U/L (ref 5–45)
BILIRUB SERPL-MCNC: 0.4 MG/DL (ref 0.2–1)
BUN SERPL-MCNC: 14 MG/DL (ref 5–25)
CALCIUM ALBUM COR SERPL-MCNC: 9.6 MG/DL (ref 8.3–10.1)
CALCIUM SERPL-MCNC: 9 MG/DL (ref 8.3–10.1)
CHLORIDE SERPL-SCNC: 106 MMOL/L (ref 96–108)
CHOLEST SERPL-MCNC: 135 MG/DL
CO2 SERPL-SCNC: 28 MMOL/L (ref 21–32)
CREAT SERPL-MCNC: 1.19 MG/DL (ref 0.6–1.3)
GFR SERPL CREATININE-BSD FRML MDRD: 46 ML/MIN/1.73SQ M
GLUCOSE P FAST SERPL-MCNC: 143 MG/DL (ref 65–99)
HDLC SERPL-MCNC: 39 MG/DL
LDLC SERPL CALC-MCNC: 51 MG/DL (ref 0–100)
NONHDLC SERPL-MCNC: 96 MG/DL
POTASSIUM SERPL-SCNC: 4.2 MMOL/L (ref 3.5–5.3)
PROT SERPL-MCNC: 7.3 G/DL (ref 6.4–8.4)
SODIUM SERPL-SCNC: 139 MMOL/L (ref 135–147)
TRIGL SERPL-MCNC: 225 MG/DL

## 2022-08-29 PROCEDURE — 36415 COLL VENOUS BLD VENIPUNCTURE: CPT

## 2022-08-29 PROCEDURE — 80061 LIPID PANEL: CPT

## 2022-08-29 PROCEDURE — 83036 HEMOGLOBIN GLYCOSYLATED A1C: CPT

## 2022-08-29 PROCEDURE — 80053 COMPREHEN METABOLIC PANEL: CPT

## 2022-08-30 LAB
EST. AVERAGE GLUCOSE BLD GHB EST-MCNC: 140 MG/DL
HBA1C MFR BLD: 6.5 %

## 2022-08-30 PROCEDURE — 3044F HG A1C LEVEL LT 7.0%: CPT | Performed by: INTERNAL MEDICINE

## 2022-09-06 ENCOUNTER — OFFICE VISIT (OUTPATIENT)
Dept: OBGYN CLINIC | Facility: MEDICAL CENTER | Age: 72
End: 2022-09-06
Payer: COMMERCIAL

## 2022-09-06 VITALS
SYSTOLIC BLOOD PRESSURE: 125 MMHG | WEIGHT: 178 LBS | HEIGHT: 61 IN | BODY MASS INDEX: 33.61 KG/M2 | HEART RATE: 109 BPM | DIASTOLIC BLOOD PRESSURE: 74 MMHG

## 2022-09-06 DIAGNOSIS — M54.2 CHRONIC NECK PAIN: Primary | ICD-10-CM

## 2022-09-06 DIAGNOSIS — M50.30 DEGENERATIVE CERVICAL DISC: ICD-10-CM

## 2022-09-06 DIAGNOSIS — G89.29 CHRONIC NECK PAIN: Primary | ICD-10-CM

## 2022-09-06 DIAGNOSIS — S13.9XXA NECK SPRAIN, INITIAL ENCOUNTER: ICD-10-CM

## 2022-09-06 DIAGNOSIS — M47.812 CERVICAL SPONDYLOSIS: ICD-10-CM

## 2022-09-06 PROCEDURE — 1160F RVW MEDS BY RX/DR IN RCRD: CPT | Performed by: EMERGENCY MEDICINE

## 2022-09-06 PROCEDURE — 99204 OFFICE O/P NEW MOD 45 MIN: CPT | Performed by: EMERGENCY MEDICINE

## 2022-09-06 PROCEDURE — 3074F SYST BP LT 130 MM HG: CPT | Performed by: EMERGENCY MEDICINE

## 2022-09-06 PROCEDURE — 3078F DIAST BP <80 MM HG: CPT | Performed by: EMERGENCY MEDICINE

## 2022-09-06 RX ORDER — METHYLPREDNISOLONE 4 MG/1
TABLET ORAL
Qty: 1 EACH | Refills: 0 | Status: SHIPPED | OUTPATIENT
Start: 2022-09-06

## 2022-09-06 RX ORDER — CYCLOBENZAPRINE HCL 5 MG
5 TABLET ORAL 3 TIMES DAILY
Qty: 30 TABLET | Refills: 0 | Status: SHIPPED | OUTPATIENT
Start: 2022-09-06

## 2022-09-06 RX ORDER — METHOCARBAMOL 500 MG/1
500 TABLET, FILM COATED ORAL 2 TIMES DAILY PRN
Qty: 30 TABLET | Refills: 0 | Status: SHIPPED | OUTPATIENT
Start: 2022-09-06 | End: 2022-09-06

## 2022-09-06 NOTE — PATIENT INSTRUCTIONS
While taking the oral steroid Medrol Dose Pack, do not take any NSAIDs such as Advil, Motrin, ibuprofen, Motrin, Aleve, naproxen, Celebrex or Meloxicam   You can restart the NSAIDs after you finish the steroids  However you may take Tylenol 500mg every 4-6 hours as needed OR max 1,000mg per dose up to 3 times per day for a total of 3,000mg per day  While taking oral steroids, you may experience mild side effects such as feeling jittery or flushing  Please call if your side effects are significant or you have any questions

## 2022-09-06 NOTE — PROGRESS NOTES
Assessment/Plan:    Diagnoses and all orders for this visit:    Chronic neck pain  -     methylPREDNISolone 4 MG tablet therapy pack; Use as directed on package  -     Ambulatory Referral to Physical Therapy; Future    Degenerative cervical disc  -     methylPREDNISolone 4 MG tablet therapy pack; Use as directed on package  -     Ambulatory Referral to Physical Therapy; Future    Cervical spondylosis  -     methylPREDNISolone 4 MG tablet therapy pack; Use as directed on package  -     Ambulatory Referral to Physical Therapy; Future    Neck sprain, initial encounter  Comments:  methocarbol  Orders:  -     methylPREDNISolone 4 MG tablet therapy pack; Use as directed on package  -     Ambulatory Referral to Physical Therapy; Future  -     methocarbamol (ROBAXIN) 500 mg tablet; Take 1 tablet (500 mg total) by mouth 2 (two) times a day as needed for muscle spasms for up to 15 days    Will start Medrol dose pack, continue muscle relaxants PRN  PT  If no improvement given chronicity will order MRI C spine and refer to Pain Management    Return in about 4 weeks (around 10/4/2022)  Chief Complaint:     Chief Complaint   Patient presents with    Neck - Pain       Subjective:   Patient ID: Shila Mazariegos is a 73 y o  female  NP presents for worsening chronic right-sided neck pain denies any radicular symptoms no numbness tingling or weakness of the arms  She has been taking OTC medications p r n  [place] She was recently evaluated at the urgent care x-rays were obtained of the cervical spine  She was provided a muscle relaxant which did help some of her symptoms  Pain is generally worse with movement of the neck in any rib rotation  Review of Systems    The following portions of the patient's chart were reviewed and updated as appropriate:      Allergie  Allergies   Allergen Reactions    Sulfa Antibiotics Rash   s   Allergen Reactions    Sulfa Antibiotics Rash      Diagnosis Date    Asthma     Back pain     COPD (chronic obstructive pulmonary disease) (HCC)     COPD (chronic obstructive pulmonary disease) (HCC)     DVT (deep venous thrombosis) (HCC)     GERD (gastroesophageal reflux disease)     Hyperlipidemia     Hypertension        Past Surgical History:   Procedure Laterality Date    APPENDECTOMY      BREAST BIOPSY      BREAST CYST EXCISION Left 2010     SECTION      COLONOSCOPY      ESOPHAGOGASTRODUODENOSCOPY      HYSTERECTOMY      OOPHORECTOMY Bilateral        Social History     Socioeconomic History    Marital status: Single     Spouse name: Not on file    Number of children: Not on file    Years of education: Not on file    Highest education level: Not on file   Occupational History    Not on file   Tobacco Use    Smoking status: Current Every Day Smoker     Packs/day: 1 00     Years: 54 00     Pack years: 54 00     Types: Cigarettes    Smokeless tobacco: Never Used   Vaping Use    Vaping Use: Never used   Substance and Sexual Activity    Alcohol use: No    Drug use: No    Sexual activity: Not on file   Other Topics Concern    Not on file   Social History Narrative    Not on file     Social Determinants of Health     Financial Resource Strain: High Risk    Difficulty of Paying Living Expenses: Hard   Food Insecurity: Not on file   Transportation Needs: No Transportation Needs    Lack of Transportation (Medical): No    Lack of Transportation (Non-Medical):  No   Physical Activity: Not on file   Stress: Not on file   Social Connections: Not on file   Intimate Partner Violence: Not on file   Housing Stability: Not on file       Family History   Problem Relation Age of Onset    Colon cancer Mother 43    Esophageal cancer Father 64    Skin cancer Sister     No Known Problems Daughter     No Known Problems Maternal Grandmother     No Known Problems Maternal Grandfather     No Known Problems Paternal Grandmother     No Known Problems Paternal Grandfather     No Known Problems Son        Medications:    Current Outpatient Medications:     albuterol (2 5 mg/3 mL) 0 083 % nebulizer solution, Take 1 vial (2 5 mg total) by nebulization every 4 (four) hours as needed for wheezing or shortness of breath, Disp: 60 vial, Rfl: 5    albuterol (2 5 mg/3 mL) 0 083 % nebulizer solution, Take 3 mL (2 5 mg total) by nebulization every 6 (six) hours as needed for wheezing or shortness of breath, Disp: 360 mL, Rfl: 0    amLODIPine (NORVASC) 10 mg tablet, TAKE 1 TABLET BY MOUTH EVERY DAY, Disp: 90 tablet, Rfl: 1    ascorbic acid (VITAMIN C) 500 MG tablet, Take 500 mg by mouth daily, Disp: , Rfl:     atorvastatin (LIPITOR) 40 mg tablet, Take 1 tablet (40 mg total) by mouth daily, Disp: 90 tablet, Rfl: 1    B Complex-C (SUPER B COMPLEX/VITAMIN C PO), Take by mouth, Disp: , Rfl:     cetirizine (ZyrTEC) 10 mg tablet, Take 10 mg by mouth daily, Disp: , Rfl:     Cranberry 450 MG TABS, Take by mouth, Disp: , Rfl:     Cyanocobalamin (VITAMIN B-12 PO), Take by mouth, Disp: , Rfl:     fluticasone-salmeterol (ADVAIR DISKUS) 250-50 mcg/dose inhaler, Inhale 1 puff 2 (two) times a day, Disp: 3 Inhaler, Rfl: 1    gabapentin (NEURONTIN) 300 mg capsule, Take 1 capsule (300 mg total) by mouth 3 (three) times a day Please change from bid to tid, Disp: 270 capsule, Rfl: 1    metFORMIN (GLUCOPHAGE-XR) 500 mg 24 hr tablet, TAKE 1 TABLET BY MOUTH EVERY DAY WITH DINNER, Disp: 90 tablet, Rfl: 1    methocarbamol (ROBAXIN) 500 mg tablet, Take 1 tablet (500 mg total) by mouth 2 (two) times a day as needed for muscle spasms for up to 15 days, Disp: 30 tablet, Rfl: 0    methylPREDNISolone 4 MG tablet therapy pack, Use as directed on package, Disp: 1 each, Rfl: 0    Multiple Vitamins-Minerals (MULTIVITAMIN ADULT PO), Take by mouth, Disp: , Rfl:     pantoprazole (PROTONIX) 40 mg tablet, TAKE 1 TABLET BY MOUTH EVERY DAY, Disp: 90 tablet, Rfl: 1    telmisartan (MICARDIS) 40 mg tablet, Take 1 tablet (40 mg total) by mouth daily, Disp: 90 tablet, Rfl: 1    TURMERIC PO, Take by mouth, Disp: , Rfl:     Calcium Carbonate-Vitamin D 600-400 MG-UNIT per chew tablet, Chew 1 tablet 2 (two) times a day for 90 days, Disp: 180 tablet, Rfl: 0    cetirizine (ZyrTEC) 10 mg tablet, Take 1 tablet (10 mg total) by mouth daily for 90 days, Disp: 90 tablet, Rfl: 1    Patient Active Problem List   Diagnosis    Essential hypertension    Hyperlipidemia    COPD (chronic obstructive pulmonary disease) (HCC)    Snoring    Daytime sleepiness    Type 2 diabetes mellitus with diabetic neuropathy, without long-term current use of insulin (HCC)    Type 2 diabetes mellitus with stage 3 chronic kidney disease, without long-term current use of insulin ([facility])       Objective:  /74   Pulse (!) 109   Ht 5' 0 75" (1 543 m)   Wt 80 7 kg (178 lb)   BMI 33 91 kg/m²     Back Exam     Comments:  Limited rotation with pain to the right  Right upper extremity strength 5/5 diffusely            Physical Exam      Neurologic Exam    Procedures    I have personally reviewed pertinent films in PACS and my interpretation is Xrays C spine reveal severe degenerative disc disease C4-5, C5-6, C6-7

## 2022-09-08 ENCOUNTER — TELEPHONE (OUTPATIENT)
Dept: OBGYN CLINIC | Facility: CLINIC | Age: 72
End: 2022-09-08

## 2022-09-08 DIAGNOSIS — S13.9XXA NECK SPRAIN, INITIAL ENCOUNTER: Primary | ICD-10-CM

## 2022-09-08 RX ORDER — METHOCARBAMOL 500 MG/1
500 TABLET, FILM COATED ORAL 2 TIMES DAILY PRN
Qty: 30 TABLET | Refills: 0 | Status: SHIPPED | OUTPATIENT
Start: 2022-09-08 | End: 2022-09-26

## 2022-09-12 NOTE — PROGRESS NOTES
PT EVALUATION    Today's date: 22  Patient name: Julio Schwartz  : 1950  MRN: [id number]  Referring provider: Dorys Dee MD  Dx:   1  Chronic neck pain    2  Degenerative cervical disc    3  Cervical spondylosis    4  Neck sprain, initial encounter        ASSESSMENT:   Julio Schwartz is a 73 y o  female who presents with signs and symptoms consistent of acute on chronic cervical neck pain  Patient presents with pain, decreased strength, decreased ROM and decreased joint mobility  Patient's primary movement problem is related to postural dysfunction with severe cervical lordosis secondary to posterior tightness and weakness anteriorly in the deep cervical neck flexors  Patient also demonstrates trigger points throughout the upper trap on the R>L as well as hypertonicity in the suboccipitals bilaterally  Due to these impairments, Patient has difficulty performing a/iadls and recreational activities  Patient would benefit from skilled physical therapy to address the impairments, improve their level of function, and to improve their overall quality of life  Secondary to monetary reasons patient will complete complex       Impairments:    restricted ROM    decreased strength   pain with function   activity intolerance   Postural dysfunction   abnormal movement    Prognosis:  Good  Positive and negative prognostic indicator(s):  multiple comorbidities    Goals:    Short Term Goals: to be achieved by 4 weeks  1) Patient to be independent with basic HEP  2) Decrease pain to 5/10 at its worst   3) Increase cervical spine ROM by 5-10 degrees in all deficient planes  4) Increase UE strength by 1/2 MMT grade in all deficient planes  5) Improve joint mobility in cervical spine to normal     Long Term Goals: to be achieved by discharge  1) FOTO equal to or greater than target score indicating improvements with overall function  2) Patient to be independent with comprehensive HEP    3) Cervical spine ROM WNL all planes to improve a/iadls  4) Increase UE strength to 1 MMT grade in all planes to improve a/iadls  5) Lifting is improved to maximal level of function     Planned interventions:  home exercise program, patient education, manual therapy, graded activity, flexibility, functional range of motion exercises, strengthening, low level laser with IASTM and modalities prn    Duration in visits:  8-12  Frequency: 1-2 visits per week  Duration in weeks:  4-6    History of Current Injury: Patient notes that a few months ago she was having some general right sided neck pain and then after a week or so it went away for a month and now has come back in the last month and has been progressively getting worse  Patient denies a KASSANDRA and notes that she woke up one day it hurt and then progressively got worse from there  Patient notes that the pain is constant and then made worse by movements  Pain location: right sided UT  Pain descriptors: dull ache and sharp with movement   Pain at Currently: 7/10  Pain at Best: 5/10  Pain at Worst:  10/10      Aggravating factors: all movements  Easing factors: medication, roll on cream sometimes helps     Imaging: see imaging tab   Special Questions: Patient notes that she isn't having any issues with sleep and does not wake her up at night      Cancer:  Family history: Yes   Personal history: No  Recent weight loss: No  Night pain: No    Infection:  Recent history of infection: No  Recent or current fever: No    Fracture:  Recent history of trauma: No  History of corticosteroid use: No  History of bone density disorders: No (unsure no testing)    Dizziness: Yes (occasional and has been going on before the neck pain started)    Dysphagia: No  Dysarthria: No  Diploplia: No  Drop Attacks: No  Numbness: No  Nausea: No  Nystagmus: No      Hobbies/Interests: n/a  Occupation: n/a  Patient goals: Patient reports goals for physical therapy would be decreased pain        Objective     Concurrent Complaints  Negative for night pain, disturbed sleep, dizziness, faints, headaches and trouble swallowing    Postural Observations  Seated posture: poor  Standing posture: poor  Correction of posture: makes symptoms better    Additional Postural Observation Details  Rounded shoulders  Severe cervical lordosis and forward head posture  Palpation     Right   Hypertonic in the scalenes, sternocleidomastoid, suboccipitals and upper trapezius  Tenderness of the scalenes, sternocleidomastoid, suboccipitals and upper trapezius  Trigger point to scalenes, sternocleidomastoid, suboccipitals and upper trapezius  Tenderness   Cervical Spine   Tenderness in the facet joint and right transverse process  Neurological Testing     Sensation   Cervical/Thoracic   Left   Intact: light touch    Right   Intact: light touch    Active Range of Motion   Cervical/Thoracic Spine       Cervical    Flexion:  Restriction level: minimal  Extension: 20 degrees     with pain  Left lateral flexion: 45 degrees     with pain  Right lateral flexion: 45 degrees     with pain  Left rotation: 45 degrees  Right rotation: 45 degrees    with pain    Passive Range of Motion     Additional Passive Range of Motion Details  Limited by hypomobility and pain  All planes      Joint Play     Hypomobile: C3, C4, C5, C6, C7, T1 and T2     Pain: C4, C5, C6 and C7     Strength/Myotome Testing   Cervical Spine     Left   Normal strength    Right   Normal strength    Tests   Cervical   Positive neck flexor muscle endurance test     Right   Positive cervical flexion-rotation test    Neuro Exam:     Headaches   Patient reports headaches: No            Precautions: GERD, COPD, asthma, HTN       Manuals             Suboccipital release              Manual stretching with myofascial release              UT [facility]                           Neuro Re-Ed             Chin tucks              Supine AROM with holds (Sidebending and rotatio focus)              Extension isometric              Upper cervical flexion with hold----> with lift              Postural re-ed with scapular retraction and push down on half bolsters sitting DEMETRIA                                       Ther Ex             Sidebeindg SNAG              Rotational SNAG              Levator scap stretch             UT stretch              Light resisted rows              Light resisted extensions                           Arm bike              Ther Activity                                       Gait Training                                       Modalities

## 2022-09-13 ENCOUNTER — EVALUATION (OUTPATIENT)
Dept: PHYSICAL THERAPY | Facility: CLINIC | Age: 72
End: 2022-09-13
Payer: COMMERCIAL

## 2022-09-13 DIAGNOSIS — M50.30 DEGENERATIVE CERVICAL DISC: ICD-10-CM

## 2022-09-13 DIAGNOSIS — G89.29 CHRONIC NECK PAIN: ICD-10-CM

## 2022-09-13 DIAGNOSIS — S13.9XXA NECK SPRAIN, INITIAL ENCOUNTER: ICD-10-CM

## 2022-09-13 DIAGNOSIS — M47.812 CERVICAL SPONDYLOSIS: ICD-10-CM

## 2022-09-13 DIAGNOSIS — M54.2 CHRONIC NECK PAIN: ICD-10-CM

## 2022-09-13 PROCEDURE — 97162 PT EVAL MOD COMPLEX 30 MIN: CPT | Performed by: PHYSICAL THERAPIST

## 2022-09-13 PROCEDURE — 97110 THERAPEUTIC EXERCISES: CPT | Performed by: PHYSICAL THERAPIST

## 2022-09-26 DIAGNOSIS — S13.9XXA NECK SPRAIN, INITIAL ENCOUNTER: ICD-10-CM

## 2022-09-26 RX ORDER — METHOCARBAMOL 500 MG/1
500 TABLET, FILM COATED ORAL 2 TIMES DAILY PRN
Qty: 30 TABLET | Refills: 0 | Status: SHIPPED | OUTPATIENT
Start: 2022-09-26 | End: 2022-10-07

## 2022-10-04 ENCOUNTER — OFFICE VISIT (OUTPATIENT)
Dept: OBGYN CLINIC | Facility: MEDICAL CENTER | Age: 72
End: 2022-10-04
Payer: COMMERCIAL

## 2022-10-04 VITALS
BODY MASS INDEX: 33.61 KG/M2 | SYSTOLIC BLOOD PRESSURE: 145 MMHG | HEART RATE: 106 BPM | HEIGHT: 61 IN | DIASTOLIC BLOOD PRESSURE: 75 MMHG | WEIGHT: 178 LBS

## 2022-10-04 DIAGNOSIS — G89.29 CHRONIC NECK PAIN: Primary | ICD-10-CM

## 2022-10-04 DIAGNOSIS — M47.812 CERVICAL SPONDYLOSIS: ICD-10-CM

## 2022-10-04 DIAGNOSIS — M50.30 DEGENERATIVE CERVICAL DISC: ICD-10-CM

## 2022-10-04 DIAGNOSIS — M54.2 CHRONIC NECK PAIN: Primary | ICD-10-CM

## 2022-10-04 PROCEDURE — 99212 OFFICE O/P EST SF 10 MIN: CPT | Performed by: EMERGENCY MEDICINE

## 2022-10-04 NOTE — PROGRESS NOTES
Assessment/Plan:    Diagnoses and all orders for this visit:    Chronic neck pain    Degenerative cervical disc    Cervical spondylosis    Patient much improved after Medrol pack  Given low GFR check with PCP regarding NSAIDs  If worsening symptoms t/c MRI C spine and Pain Management referral    No follow-ups on file  Chief Complaint:     Chief Complaint   Patient presents with    Neck - Follow-up, Pain    Right Shoulder - Follow-up, Pain       Subjective:   Patient ID: [id number] Joseph is a 73 y o  female  Patient returns with improvement s/p Medrol dose pack, she attended PT eval but doing HEP due to cost       Initial note:  NP presents for worsening chronic right-sided neck pain denies any radicular symptoms no numbness tingling or weakness of the arms  She has been taking OTC medications p r n  Valery Renee She was recently evaluated at the urgent care x-rays were obtained of the cervical spine  She was provided a muscle relaxant which did help some of her symptoms  Pain is generally worse with movement of the neck in any rib rotation  Review of Systems    The following portions of the patient's chart were reviewed and updated as appropriate:    Allergy:    Allergies   Allergen Reactions    Sulfa Antibiotics Rash         Past Medical History:   Diagnosis Date    Asthma     Back pain     COPD (chronic obstructive pulmonary disease) (HCC)     COPD (chronic obstructive pulmonary disease) (HCC)     DVT (deep venous thrombosis) (HCC)     GERD (gastroesophageal reflux disease)     Hyperlipidemia     Hypertension        Past Surgical History:   Procedure Laterality Date    APPENDECTOMY      BREAST BIOPSY      BREAST CYST EXCISION Left 2010     SECTION      COLONOSCOPY      ESOPHAGOGASTRODUODENOSCOPY      HYSTERECTOMY      OOPHORECTOMY Bilateral        Social History     Socioeconomic History    Marital status: Single     Spouse name: Not on file    Number of children: Not on file  Years of education: Not on file    Highest education level: Not on file   Occupational History    Not on file   Tobacco Use    Smoking status: Current Every Day Smoker     Packs/day: 1 00     Years: 54 00     Pack years: 54 00     Types: Cigarettes    Smokeless tobacco: Never Used   Vaping Use    Vaping Use: Never used   Substance and Sexual Activity    Alcohol use: No    Drug use: No    Sexual activity: Not on file   Other Topics Concern    Not on file   Social History Narrative    Not on file     Social Determinants of Health     Financial Resource Strain: High Risk    Difficulty of Paying Living Expenses: Hard   Food Insecurity: Not on file   Transportation Needs: No Transportation Needs    Lack of Transportation (Medical): No    Lack of Transportation (Non-Medical):  No   Physical Activity: Not on file   Stress: Not on file   Social Connections: Not on file   Intimate Partner Violence: Not on file   Housing Stability: Not on file       Family History   Problem Relation Age of Onset    Colon cancer Mother 43    Esophageal cancer Father 64    Skin cancer Sister     No Known Problems Daughter     No Known Problems Maternal Grandmother     No Known Problems Maternal Grandfather     No Known Problems Paternal Grandmother     No Known Problems Paternal Grandfather     No Known Problems Son        Medications:    Current Outpatient Medications:     albuterol (2 5 mg/3 mL) 0 083 % nebulizer solution, Take 1 vial (2 5 mg total) by nebulization every 4 (four) hours as needed for wheezing or shortness of breath, Disp: 60 vial, Rfl: 5    albuterol (2 5 mg/3 mL) 0 083 % nebulizer solution, Take 3 mL (2 5 mg total) by nebulization every 6 (six) hours as needed for wheezing or shortness of breath, Disp: 360 mL, Rfl: 0    amLODIPine (NORVASC) 10 mg tablet, TAKE 1 TABLET BY MOUTH EVERY DAY, Disp: 90 tablet, Rfl: 1    ascorbic acid (VITAMIN C) 500 MG tablet, Take 500 mg by mouth daily, Disp: , Rfl:   atorvastatin (LIPITOR) 40 mg tablet, Take 1 tablet (40 mg total) by mouth daily, Disp: 90 tablet, Rfl: 1    B Complex-C (SUPER B COMPLEX/VITAMIN C PO), Take by mouth, Disp: , Rfl:     cetirizine (ZyrTEC) 10 mg tablet, Take 10 mg by mouth daily, Disp: , Rfl:     Cranberry 450 MG TABS, Take by mouth, Disp: , Rfl:     Cyanocobalamin (VITAMIN B-12 PO), Take by mouth, Disp: , Rfl:     cyclobenzaprine (FLEXERIL) 5 mg tablet, Take 1 tablet (5 mg total) by mouth 3 (three) times a day, Disp: 30 tablet, Rfl: 0    fluticasone-salmeterol (ADVAIR DISKUS) 250-50 mcg/dose inhaler, Inhale 1 puff 2 (two) times a day, Disp: 3 Inhaler, Rfl: 1    gabapentin (NEURONTIN) 300 mg capsule, Take 1 capsule (300 mg total) by mouth 3 (three) times a day Please change from bid to tid, Disp: 270 capsule, Rfl: 1    metFORMIN (GLUCOPHAGE-XR) 500 mg 24 hr tablet, TAKE 1 TABLET BY MOUTH EVERY DAY WITH DINNER, Disp: 90 tablet, Rfl: 1    methocarbamol (ROBAXIN) 500 mg tablet, TAKE 1 TABLET (500 MG TOTAL) BY MOUTH 2 (TWO) TIMES A DAY AS NEEDED FOR MUSCLE SPASMS, Disp: 30 tablet, Rfl: 0    methylPREDNISolone 4 MG tablet therapy pack, Use as directed on package, Disp: 1 each, Rfl: 0    Multiple Vitamins-Minerals (MULTIVITAMIN ADULT PO), Take by mouth, Disp: , Rfl:     pantoprazole (PROTONIX) 40 mg tablet, TAKE 1 TABLET BY MOUTH EVERY DAY, Disp: 90 tablet, Rfl: 1    telmisartan (MICARDIS) 40 mg tablet, Take 1 tablet (40 mg total) by mouth daily, Disp: 90 tablet, Rfl: 1    TURMERIC PO, Take by mouth, Disp: , Rfl:     Calcium Carbonate-Vitamin D 600-400 MG-UNIT per chew tablet, Chew 1 tablet 2 (two) times a day for 90 days, Disp: 180 tablet, Rfl: 0    cetirizine (ZyrTEC) 10 mg tablet, Take 1 tablet (10 mg total) by mouth daily for 90 days, Disp: 90 tablet, Rfl: 1    Patient Active Problem List   Diagnosis    Essential hypertension    Hyperlipidemia    COPD (chronic obstructive pulmonary disease) (HCC)    Snoring    Daytime sleepiness    Type 2 diabetes mellitus with diabetic neuropathy, without long-term current use of insulin (HCC)    Type 2 diabetes mellitus with stage 3 chronic kidney disease, without long-term current use of insulin (HCC)       Objective:  /75   Pulse (!) 106   Ht 5' 0 75" (1 543 m)   Wt 80 7 kg (178 lb)   BMI 33 91 kg/m²     Ortho Exam    Physical Exam      Neurologic Exam    Procedures    I have personally reviewed the written report of the pertinent studies       Moderate to severe degenerative disc disease C4-5, C5-6, C6-7

## 2022-10-07 DIAGNOSIS — S13.9XXA NECK SPRAIN, INITIAL ENCOUNTER: ICD-10-CM

## 2022-10-07 RX ORDER — METHOCARBAMOL 500 MG/1
500 TABLET, FILM COATED ORAL 2 TIMES DAILY PRN
Qty: 30 TABLET | Refills: 0 | Status: SHIPPED | OUTPATIENT
Start: 2022-10-07 | End: 2022-10-27

## 2022-10-12 DIAGNOSIS — G62.9 NEUROPATHY: ICD-10-CM

## 2022-10-12 RX ORDER — GABAPENTIN 300 MG/1
300 CAPSULE ORAL 3 TIMES DAILY
Qty: 270 CAPSULE | Refills: 0 | Status: SHIPPED | OUTPATIENT
Start: 2022-10-12

## 2022-10-17 NOTE — TELEPHONE ENCOUNTER
Dr Betancourt  Re: Medications  #: [phone]    Patient states she was to get methocarbamol but when she got notification from pharmacy she states she is getting cyclobenzaprine  She is confused as to why it was changed  She would like a call back to discuss  English

## 2022-10-17 NOTE — PROGRESS NOTES
PT Discharge    Patient unable to attend therapy secondary to monetary reasons  Patient canceled only follow up  Patient was eval only and has no returned since 9/20/22  Patient appropriate for DC this date

## 2022-10-27 DIAGNOSIS — S13.9XXA NECK SPRAIN, INITIAL ENCOUNTER: ICD-10-CM

## 2022-10-27 RX ORDER — METHOCARBAMOL 500 MG/1
500 TABLET, FILM COATED ORAL 2 TIMES DAILY PRN
Qty: 30 TABLET | Refills: 0 | Status: SHIPPED | OUTPATIENT
Start: 2022-10-27

## 2022-11-01 LAB
LEFT EYE DIABETIC RETINOPATHY: NORMAL
RIGHT EYE DIABETIC RETINOPATHY: NORMAL

## 2022-11-01 PROCEDURE — 2023F DILAT RTA XM W/O RTNOPTHY: CPT | Performed by: EMERGENCY MEDICINE

## 2022-11-29 ENCOUNTER — OFFICE VISIT (OUTPATIENT)
Dept: FAMILY MEDICINE CLINIC | Facility: CLINIC | Age: 72
End: 2022-11-29

## 2022-11-29 VITALS
DIASTOLIC BLOOD PRESSURE: 80 MMHG | TEMPERATURE: 97.6 F | HEIGHT: 61 IN | BODY MASS INDEX: 33.87 KG/M2 | WEIGHT: 179.4 LBS | HEART RATE: 88 BPM | SYSTOLIC BLOOD PRESSURE: 128 MMHG | OXYGEN SATURATION: 93 %

## 2022-11-29 DIAGNOSIS — S13.9XXA NECK SPRAIN, INITIAL ENCOUNTER: ICD-10-CM

## 2022-11-29 DIAGNOSIS — G62.9 NEUROPATHY: ICD-10-CM

## 2022-11-29 DIAGNOSIS — I10 ESSENTIAL HYPERTENSION: ICD-10-CM

## 2022-11-29 DIAGNOSIS — E78.5 DYSLIPIDEMIA: ICD-10-CM

## 2022-11-29 DIAGNOSIS — N18.30 TYPE 2 DIABETES MELLITUS WITH STAGE 3 CHRONIC KIDNEY DISEASE, WITHOUT LONG-TERM CURRENT USE OF INSULIN, UNSPECIFIED WHETHER STAGE 3A OR 3B CKD (HCC): Primary | ICD-10-CM

## 2022-11-29 DIAGNOSIS — E11.22 TYPE 2 DIABETES MELLITUS WITH STAGE 3 CHRONIC KIDNEY DISEASE, WITHOUT LONG-TERM CURRENT USE OF INSULIN, UNSPECIFIED WHETHER STAGE 3A OR 3B CKD (HCC): Primary | ICD-10-CM

## 2022-11-29 LAB — SL AMB POCT HEMOGLOBIN AIC: 6.9 (ref ?–6.5)

## 2022-11-29 RX ORDER — METHOCARBAMOL 500 MG/1
250 TABLET, FILM COATED ORAL
Qty: 30 TABLET | Refills: 0 | Status: SHIPPED | OUTPATIENT
Start: 2022-11-29

## 2022-11-29 RX ORDER — AMLODIPINE BESYLATE 10 MG/1
10 TABLET ORAL DAILY
Qty: 90 TABLET | Refills: 1 | Status: SHIPPED | OUTPATIENT
Start: 2022-11-29

## 2022-11-29 RX ORDER — ATORVASTATIN CALCIUM 40 MG/1
40 TABLET, FILM COATED ORAL DAILY
Qty: 90 TABLET | Refills: 1 | Status: SHIPPED | OUTPATIENT
Start: 2022-11-29

## 2022-11-29 RX ORDER — GABAPENTIN 300 MG/1
300 CAPSULE ORAL 3 TIMES DAILY
Qty: 270 CAPSULE | Refills: 0 | Status: SHIPPED | OUTPATIENT
Start: 2022-11-29

## 2022-11-29 RX ORDER — TELMISARTAN 40 MG/1
40 TABLET ORAL DAILY
Qty: 90 TABLET | Refills: 1 | Status: SHIPPED | OUTPATIENT
Start: 2022-11-29

## 2022-11-29 NOTE — ASSESSMENT & PLAN NOTE
Lab Results   Component Value Date    HGBA1C 6 9 (A) 11/29/2022     Stable  Continue current medication

## 2022-11-29 NOTE — ASSESSMENT & PLAN NOTE
128/80 today  Goal blood pressure should be less than 130/80  Refilled medication including mycardis and amlodipine

## 2022-11-29 NOTE — PROGRESS NOTES
Name: Lulu Henderson      : 1950      MRN: [id number]  Encounter Provider: Traci Parra MD  Encounter Date: 2022   Encounter department: [address] [place] [address]  Type 2 diabetes mellitus with stage 3 chronic kidney disease, without long-term current use of insulin, unspecified whether stage 3a or 3b CKD ([facility] [address] )  Assessment & Plan:    Lab Results   Component Value Date    HGBA1C 6 9 (A) 2022     Stable  Continue current medication    Orders:  -     POCT hemoglobin A1c    2  Neuropathy  Comments:  stable on gabapentine  Orders:  -     gabapentin (NEURONTIN) 300 mg capsule; Take 1 capsule (300 mg total) by mouth 3 (three) times a day Please change from bid to tid    3  Dyslipidemia  Comments:  on statin  Orders:  -     atorvastatin (LIPITOR) 40 mg tablet; Take 1 tablet (40 mg total) by mouth daily    4  Essential hypertension  Comments:  increase norvasc  Assessment & Plan:  128/80 today  Goal blood pressure should be less than 130/80  Refilled medication including mycardis and amlodipine    Orders:  -     telmisartan (MICARDIS) 40 mg tablet; Take 1 tablet (40 mg total) by mouth daily  -     amLODIPine (NORVASC) 10 mg tablet; Take 1 tablet (10 mg total) by mouth daily    5  Essential hypertension  Comments:  stable  Assessment & Plan:  128/80 today  Goal blood pressure should be less than 130/80  Refilled medication including mycardis and amlodipine    Orders:  -     telmisartan (MICARDIS) 40 mg tablet; Take 1 tablet (40 mg total) by mouth daily  -     amLODIPine (NORVASC) 10 mg tablet; Take 1 tablet (10 mg total) by mouth daily    6  Neck sprain, initial encounter  -     methocarbamol (ROBAXIN) 500 mg tablet; Take 0 5 tablets (250 mg total) by mouth daily at bedtime      Tobacco Cessation Counseling: Tobacco cessation counseling and education was provided   The patient is sincerely urged to quit consumption of tobacco  She is not ready to quit tobacco  The numerous health risks of tobacco consumption were discussed  If she decides to quit, there are a number of helpful adjunctive aids, and she can see me to discuss nicotine replacement therapy, chantix, or bupropion anytime in the future  Subjective      HPI     82-year-old female patient presents for follow-up regarding her chronic medical condition  Patient was last seen 08/18/2022, for her annual Medicare wellness exam   At that time she discussed her diabetes, hypertension, neck sprain, dyslipidemia with her PCP  Today patient reports she is overall doing well, her hemoglobin A1c is 6 9 today which is slightly increased from her previous readings  Patient has been compliant with medication, requesting refill on gabapentin, atorvastatin, pantoprazole, amlodipine, Micardis  Patient's blood pressure is 128/80 today  She is also requesting refill methocarbamol  Denies any lightheadedness dizziness while using the medication  Patient reports her neck sprain is improving but she continues to require some medication at night  Patient continues to use tobacco, is not ready to quit at this time  Patient "kind of overdid it" over the holiday in terms of food  Uses 2L NC oxygen at night    Review of Systems   Constitutional: Negative for chills and fever  HENT: Negative for congestion, rhinorrhea and sore throat  Voice change: Hoarse voice  Respiratory: Positive for cough ( smoker's cough)  Negative for shortness of breath  Cardiovascular: Negative for chest pain  Gastrointestinal: Negative for abdominal pain  Musculoskeletal: Positive for neck pain  Negative for arthralgias  Neurological: Negative for dizziness, light-headedness and headaches  Psychiatric/Behavioral: Positive for sleep disturbance          Current Outpatient Medications on File Prior to Visit   Medication Sig   • albuterol (2 5 mg/3 mL) 0 083 % nebulizer solution Take 1 vial (2 5 mg total) by nebulization every 4 (four) hours as needed for wheezing or shortness of breath   • albuterol (2 5 mg/3 mL) 0 083 % nebulizer solution Take 3 mL (2 5 mg total) by nebulization every 6 (six) hours as needed for wheezing or shortness of breath   • ascorbic acid (VITAMIN C) 500 MG tablet Take 500 mg by mouth daily   • B Complex-C (SUPER B COMPLEX/VITAMIN C PO) Take by mouth   • cetirizine (ZyrTEC) 10 mg tablet Take 10 mg by mouth daily   • Cranberry 450 MG TABS Take by mouth   • Cyanocobalamin (VITAMIN B-12 PO) Take by mouth   • fluticasone-salmeterol (ADVAIR DISKUS) 250-50 mcg/dose inhaler Inhale 1 puff 2 (two) times a day (Patient taking differently: Inhale 1 puff 2 (two) times a day As needed)   • metFORMIN (GLUCOPHAGE-XR) 500 mg 24 hr tablet TAKE 1 TABLET BY MOUTH EVERY DAY WITH DINNER   • Multiple Vitamins-Minerals (MULTIVITAMIN ADULT PO) Take by mouth   • pantoprazole (PROTONIX) 40 mg tablet TAKE 1 TABLET BY MOUTH EVERY DAY   • TURMERIC PO Take by mouth   • [DISCONTINUED] amLODIPine (NORVASC) 10 mg tablet TAKE 1 TABLET BY MOUTH EVERY DAY   • [DISCONTINUED] atorvastatin (LIPITOR) 40 mg tablet Take 1 tablet (40 mg total) by mouth daily   • [DISCONTINUED] cyclobenzaprine (FLEXERIL) 5 mg tablet Take 1 tablet (5 mg total) by mouth 3 (three) times a day (Patient taking differently: Take 5 mg by mouth 3 (three) times a day At bedtime)   • [DISCONTINUED] gabapentin (NEURONTIN) 300 mg capsule TAKE 1 CAPSULE (300 MG TOTAL) BY MOUTH 3 (THREE) TIMES A DAY PLEASE CHANGE FROM BID TO TID   • [DISCONTINUED] methocarbamol (ROBAXIN) 500 mg tablet TAKE 1 TABLET (500 MG TOTAL) BY MOUTH 2 (TWO) TIMES A DAY AS NEEDED FOR MUSCLE SPASMS   • [DISCONTINUED] telmisartan (MICARDIS) 40 mg tablet Take 1 tablet (40 mg total) by mouth daily   • Calcium Carbonate-Vitamin D 600-400 MG-UNIT per chew tablet Chew 1 tablet 2 (two) times a day for 90 days   • cetirizine (ZyrTEC) 10 mg tablet Take 1 tablet (10 mg total) by mouth daily for 90 days   • methylPREDNISolone 4 MG tablet therapy pack Use as directed on package (Patient not taking: Reported on 11/29/2022)       Objective     /80 (BP Location: Right arm, Patient Position: Sitting, Cuff Size: Large)   Pulse 88   Temp 97 6 °F (36 4 °C) (Temporal)   Ht 5' 0 75" (1 543 m)   Wt 81 4 kg (179 lb 6 4 oz)   SpO2 93%   BMI 34 18 kg/m²     Physical Exam  Vitals reviewed  Constitutional:       General: She is not in acute distress  Appearance: Normal appearance  She is not ill-appearing, toxic-appearing or diaphoretic  Comments: Hoarse voice   HENT:      Mouth/Throat:      Comments: Dentures on top and bottom   Cardiovascular:      Rate and Rhythm: Normal rate and regular rhythm  Pulses: Normal pulses  Heart sounds: Normal heart sounds  No murmur heard  Pulmonary:      Effort: Pulmonary effort is normal  No respiratory distress  Breath sounds: Normal breath sounds  Abdominal:      General: Abdomen is flat  Bowel sounds are normal  There is no distension  Palpations: Abdomen is soft  Musculoskeletal:         General: No swelling, tenderness, deformity or signs of injury  Normal range of motion  Skin:     General: Skin is warm and dry  Capillary Refill: Capillary refill takes less than 2 seconds  Coloration: Skin is not jaundiced  Neurological:      General: No focal deficit present  Mental Status: She is alert and oriented to person, place, and time     Psychiatric:         Mood and Affect: Mood normal           Kay Mayers MD

## 2023-02-23 ENCOUNTER — RA CDI HCC (OUTPATIENT)
Dept: OTHER | Facility: HOSPITAL | Age: 73
End: 2023-02-23

## 2023-02-23 DIAGNOSIS — R10.13 DYSPEPSIA: ICD-10-CM

## 2023-02-23 RX ORDER — PANTOPRAZOLE SODIUM 40 MG/1
TABLET, DELAYED RELEASE ORAL
Qty: 90 TABLET | Refills: 1 | Status: SHIPPED | OUTPATIENT
Start: 2023-02-23

## 2023-02-23 NOTE — PROGRESS NOTES
[facility]von[facility]  coding opportunities       Chart reviewed, no opportunity found:   [address]        Patients Insurance     Medicare Insurance: [employer]

## 2023-02-23 NOTE — PROGRESS NOTES
[facility]  coding opportunities          Chart Reviewed number of suggestions sent to Provider: 1   [id number]    Patients Insurance     Medicare Insurance: [employer]

## 2023-03-16 ENCOUNTER — OFFICE VISIT (OUTPATIENT)
Dept: FAMILY MEDICINE CLINIC | Facility: CLINIC | Age: 73
End: 2023-03-16

## 2023-03-16 VITALS
BODY MASS INDEX: 34.17 KG/M2 | WEIGHT: 181 LBS | SYSTOLIC BLOOD PRESSURE: 120 MMHG | OXYGEN SATURATION: 91 % | HEART RATE: 102 BPM | DIASTOLIC BLOOD PRESSURE: 60 MMHG | HEIGHT: 61 IN | TEMPERATURE: 97.6 F

## 2023-03-16 DIAGNOSIS — Z12.31 ENCOUNTER FOR SCREENING MAMMOGRAM FOR MALIGNANT NEOPLASM OF BREAST: ICD-10-CM

## 2023-03-16 DIAGNOSIS — N18.30 TYPE 2 DIABETES MELLITUS WITH STAGE 3 CHRONIC KIDNEY DISEASE, WITHOUT LONG-TERM CURRENT USE OF INSULIN, UNSPECIFIED WHETHER STAGE 3A OR 3B CKD (HCC): ICD-10-CM

## 2023-03-16 DIAGNOSIS — J44.1 COPD EXACERBATION (HCC): ICD-10-CM

## 2023-03-16 DIAGNOSIS — E11.40 TYPE 2 DIABETES MELLITUS WITH DIABETIC NEUROPATHY, WITHOUT LONG-TERM CURRENT USE OF INSULIN (HCC): Primary | ICD-10-CM

## 2023-03-16 DIAGNOSIS — Z12.2 SCREENING FOR LUNG CANCER: ICD-10-CM

## 2023-03-16 DIAGNOSIS — E11.22 TYPE 2 DIABETES MELLITUS WITH STAGE 3 CHRONIC KIDNEY DISEASE, WITHOUT LONG-TERM CURRENT USE OF INSULIN, UNSPECIFIED WHETHER STAGE 3A OR 3B CKD (HCC): ICD-10-CM

## 2023-03-16 LAB — SL AMB POCT HEMOGLOBIN AIC: 7.1 (ref ?–6.5)

## 2023-03-16 NOTE — PROGRESS NOTES
Name: Nely Barlow      : 1950      MRN: [id number]  Encounter Provider: Beau Ramírez MD  Encounter Date: 3/16/2023   Encounter department: [address] [place] [address]     1  Type 2 diabetes mellitus with diabetic neuropathy, without long-term current use of insulin (HCC)  -     POCT hemoglobin A1c    2  Encounter for screening mammogram for malignant neoplasm of breast  -     Mammo screening bilateral w 3d & cad; Future; Expected date: 2023    3  COPD exacerbation ([place] [id number] )    4  Type 2 diabetes mellitus with stage 3 chronic kidney disease, without long-term current use of insulin, unspecified whether stage 3a or 3b CKD ([place] [address] )    5  Screening for lung cancer  Assessment & Plan:  I discussed with her that she is a candidate for lung cancer CT screening  The following Shared Decision-Making points were covered:  1  Benefits of screening were discussed, including the rates of reduction in death from lung cancer and other causes  Harms of screening were reviewed, including false positive tests, radiation exposure levels, risks of invasive procedures, risks of complications of screening, and risk of overdiagnosis  2  I counseled on the importance of adherence to annual lung cancer LDCT screening, impact of co-morbidities, and ability or willingness to undergo diagnosis and treatment  3  I counseled on the importance of maintaining abstinence as a former smoker or was counseled on the importance of smoking cessation if a current smoker    Review of Eligibility Criteria: She meets all of the criteria for Lung Cancer Screening  · She is 81 y o  · She has 20 pack year tobacco history and is a current smoker or has quit within the past 15 years  · She presents no signs or symptoms of lung cancer    After discussion, the patient decided to elect lung cancer screening      Orders:  -     CT lung screening program; Future; Expected date: 2023      Patient diabetes stable at this time, continue current dosage of metformin  Patient is not ready to quit smoking at this time, agreeable with lung cancer screening, will provide prescription for CT scan  Will provide prescription for mammogram       Tobacco Cessation Counseling: Tobacco cessation counseling and education was provided  The patient is sincerely urged to quit consumption of tobacco  She is not ready to quit tobacco  The numerous health risks of tobacco consumption were discussed  If she decides to quit, there are a number of helpful adjunctive aids, and she can see me to discuss nicotine replacement therapy, chantix, or bupropion anytime in the future  BMI Counseling: Body mass index is 34 48 kg/m²  The BMI is above normal  Nutrition recommendations include reducing portion sizes, 3-5 servings of fruits/vegetables daily and consuming healthier snacks  Exercise recommendations include moderate aerobic physical activity for 150 minutes/week  Subjective     HPI     30-year-old female patient presents for evaluation of her chronic medical condition  Patient was last seen in 1/29/2022 for condition  Hemoglobin A1c was 6 9 at that time  hba1c is 7 1 today  Patient's been compliant with her medication  Neuropathy, dyslipidemia, hypertension was stable  Patient did have some neck pain which is improving with occasional flareups  Patient's blood pressure today is mildly elevated, 156/70  Requesting mammogram prescription as her mammogram was more than 1 year ago  Review of Systems   Constitutional: Positive for chills  Negative for fever  HENT: Negative for congestion, rhinorrhea and sore throat  URI symptoms, last Sunday  covid-19 negative   Eyes: Positive for visual disturbance  Respiratory: Positive for shortness of breath (baseline COPD)  Cardiovascular: Negative for chest pain  Gastrointestinal: Positive for nausea  Negative for abdominal pain, constipation, diarrhea and vomiting          3 hour 1 week ago   Neurological: Negative for headaches  Psychiatric/Behavioral: Negative for sleep disturbance  Past Medical History:   Diagnosis Date   • Asthma    • Back pain    • COPD (chronic obstructive pulmonary disease) (HCC)    • COPD (chronic obstructive pulmonary disease) (HCC)    • DVT (deep venous thrombosis) (HCC)    • GERD (gastroesophageal reflux disease)    • Hyperlipidemia    • Hypertension      Past Surgical History:   Procedure Laterality Date   • APPENDECTOMY     • BREAST BIOPSY     • BREAST CYST EXCISION Left    •  SECTION     • COLONOSCOPY     • ESOPHAGOGASTRODUODENOSCOPY     • HYSTERECTOMY     • OOPHORECTOMY Bilateral      Family History   Problem Relation Age of Onset   • Colon cancer Mother 43   • Esophageal cancer Father 64   • Skin cancer Sister    • No Known Problems Daughter    • No Known Problems Maternal Grandmother    • No Known Problems Maternal Grandfather    • No Known Problems Paternal Grandmother    • No Known Problems Paternal Grandfather    • No Known Problems Son      Social History     Socioeconomic History   • Marital status: Single     Spouse name: None   • Number of children: None   • Years of education: None   • Highest education level: None   Occupational History   • None   Tobacco Use   • Smoking status: Every Day     Packs/day: 1 00     Years: 54 00     Pack years: 54 00     Types: Cigarettes   • Smokeless tobacco: Never   Vaping Use   • Vaping Use: Never used   Substance and Sexual Activity   • Alcohol use: No   • Drug use: No   • Sexual activity: None   Other Topics Concern   • None   Social History Narrative   • None     Social Determinants of Health     Financial Resource Strain: High Risk   • Difficulty of Paying Living Expenses: Hard   Food Insecurity: Not on file   Transportation Needs: No Transportation Needs   • Lack of Transportation (Medical): No   • Lack of Transportation (Non-Medical):  No   Physical Activity: Not on file   Stress: Not on file   Social Connections: Not on file   Intimate Partner Violence: Not on file   Housing Stability: Not on file     Current Outpatient Medications on File Prior to Visit   Medication Sig   • albuterol (2 5 mg/3 mL) 0 083 % nebulizer solution Take 1 vial (2 5 mg total) by nebulization every 4 (four) hours as needed for wheezing or shortness of breath   • albuterol (2 5 mg/3 mL) 0 083 % nebulizer solution Take 3 mL (2 5 mg total) by nebulization every 6 (six) hours as needed for wheezing or shortness of breath   • amLODIPine (NORVASC) 10 mg tablet Take 1 tablet (10 mg total) by mouth daily   • ascorbic acid (VITAMIN C) 500 MG tablet Take 500 mg by mouth daily   • atorvastatin (LIPITOR) 40 mg tablet Take 1 tablet (40 mg total) by mouth daily   • B Complex-C (SUPER B COMPLEX/VITAMIN C PO) Take by mouth   • cetirizine (ZyrTEC) 10 mg tablet Take 10 mg by mouth daily   • Cranberry 450 MG TABS Take by mouth   • Cyanocobalamin (VITAMIN B-12 PO) Take by mouth   • fluticasone-salmeterol (ADVAIR DISKUS) 250-50 mcg/dose inhaler Inhale 1 puff 2 (two) times a day (Patient taking differently: Inhale 1 puff 2 (two) times a day As needed)   • gabapentin (NEURONTIN) 300 mg capsule Take 1 capsule (300 mg total) by mouth 3 (three) times a day Please change from bid to tid   • metFORMIN (GLUCOPHAGE-XR) 500 mg 24 hr tablet TAKE 1 TABLET BY MOUTH EVERY DAY WITH DINNER   • methocarbamol (ROBAXIN) 500 mg tablet Take 0 5 tablets (250 mg total) by mouth daily at bedtime   • Multiple Vitamins-Minerals (MULTIVITAMIN ADULT PO) Take by mouth   • pantoprazole (PROTONIX) 40 mg tablet TAKE 1 TABLET BY MOUTH EVERY DAY   • telmisartan (MICARDIS) 40 mg tablet Take 1 tablet (40 mg total) by mouth daily   • TURMERIC PO Take by mouth   • Calcium Carbonate-Vitamin D 600-400 MG-UNIT per chew tablet Chew 1 tablet 2 (two) times a day for 90 days   • methylPREDNISolone 4 MG tablet therapy pack Use as directed on package (Patient not taking: Reported on 11/29/2022)     Allergies   Allergen Reactions   • Sulfa Antibiotics Rash     Immunization History   Administered Date(s) Administered   • COVID-19 MODERNA VACC 0 5 ML IM 03/18/2021, 04/15/2021, 10/25/2021   • INFLUENZA 11/19/2018, 10/02/2021, 10/04/2022   • Influenza Quadrivalent Preservative Free 3 years and older IM 11/08/2017   • Influenza Split High Dose Preservative Free IM 11/19/2018   • Influenza Whole 11/08/2012, 10/23/2013, 10/24/2014   • Influenza, Quadrivalent (nasal) 11/08/2017   • Influenza, high dose seasonal 0 7 mL 10/15/2019, 10/06/2020   • Influenza, seasonal, injectable 11/21/2016   • Influenza, seasonal, injectable, preservative free 11/02/2015   • Pneumococcal Conjugate 13-Valent 04/11/2019   • Pneumococcal Polysaccharide PPV23 05/06/2013, 11/05/2020   • Tdap 08/10/2016   • Zoster 06/02/2014   • Zoster Vaccine Recombinant 11/17/2021       Objective     /60   Pulse 102   Temp 97 6 °F (36 4 °C)   Ht 5' 0 75" (1 543 m)   Wt 82 1 kg (181 lb)   SpO2 91%   BMI 34 48 kg/m²     Physical Exam  Vitals reviewed  Constitutional:       General: She is not in acute distress  Appearance: Normal appearance  She is not ill-appearing, toxic-appearing or diaphoretic  Cardiovascular:      Rate and Rhythm: Normal rate and regular rhythm  Pulses: Normal pulses  Heart sounds: Normal heart sounds  No murmur heard  Pulmonary:      Effort: Pulmonary effort is normal  No respiratory distress  Breath sounds: Normal breath sounds  Comments: Mild wheezing at the bases of the lung  Abdominal:      General: Abdomen is flat  Bowel sounds are normal  There is no distension  Palpations: Abdomen is soft  Musculoskeletal:         General: No swelling, tenderness, deformity or signs of injury  Normal range of motion  Skin:     General: Skin is warm and dry  Capillary Refill: Capillary refill takes less than 2 seconds  Coloration: Skin is not jaundiced     Neurological: General: No focal deficit present  Mental Status: She is alert and oriented to person, place, and time     Psychiatric:         Mood and Affect: Mood normal             Naeem Salazar MD

## 2023-03-16 NOTE — ASSESSMENT & PLAN NOTE
I discussed with her that she is a candidate for lung cancer CT screening  The following Shared Decision-Making points were covered:  1  Benefits of screening were discussed, including the rates of reduction in death from lung cancer and other causes  Harms of screening were reviewed, including false positive tests, radiation exposure levels, risks of invasive procedures, risks of complications of screening, and risk of overdiagnosis  2  I counseled on the importance of adherence to annual lung cancer LDCT screening, impact of co-morbidities, and ability or willingness to undergo diagnosis and treatment  3  I counseled on the importance of maintaining abstinence as a former smoker or was counseled on the importance of smoking cessation if a current smoker    Review of Eligibility Criteria: She meets all of the criteria for Lung Cancer Screening  · She is 81 y o  · She has 20 pack year tobacco history and is a current smoker or has quit within the past 15 years  · She presents no signs or symptoms of lung cancer    After discussion, the patient decided to elect lung cancer screening

## 2023-03-23 ENCOUNTER — HOSPITAL ENCOUNTER (OUTPATIENT)
Dept: RADIOLOGY | Facility: MEDICAL CENTER | Age: 73
Discharge: HOME | End: 2023-03-23

## 2023-03-23 DIAGNOSIS — Z12.2 SCREENING FOR LUNG CANCER: ICD-10-CM

## 2023-05-03 ENCOUNTER — HOSPITAL ENCOUNTER (OUTPATIENT)
Dept: RADIOLOGY | Facility: MEDICAL CENTER | Age: 73
Discharge: HOME | End: 2023-05-03

## 2023-05-03 VITALS — BODY MASS INDEX: 34.17 KG/M2 | HEIGHT: 61 IN | WEIGHT: 181 LBS

## 2023-05-03 DIAGNOSIS — Z12.31 ENCOUNTER FOR SCREENING MAMMOGRAM FOR MALIGNANT NEOPLASM OF BREAST: ICD-10-CM

## 2023-06-19 ENCOUNTER — OFFICE VISIT (OUTPATIENT)
Dept: FAMILY MEDICINE CLINIC | Facility: CLINIC | Age: 73
End: 2023-06-19
Payer: COMMERCIAL

## 2023-06-19 VITALS
TEMPERATURE: 97.6 F | SYSTOLIC BLOOD PRESSURE: 132 MMHG | HEART RATE: 104 BPM | HEIGHT: 61 IN | BODY MASS INDEX: 33.99 KG/M2 | DIASTOLIC BLOOD PRESSURE: 66 MMHG | OXYGEN SATURATION: 94 % | WEIGHT: 180 LBS

## 2023-06-19 DIAGNOSIS — M54.12 CERVICAL RADICULOPATHY: ICD-10-CM

## 2023-06-19 DIAGNOSIS — E11.40 TYPE 2 DIABETES MELLITUS WITH DIABETIC NEUROPATHY, WITHOUT LONG-TERM CURRENT USE OF INSULIN (HCC): Primary | ICD-10-CM

## 2023-06-19 DIAGNOSIS — J44.9 CHRONIC OBSTRUCTIVE PULMONARY DISEASE, UNSPECIFIED COPD TYPE (HCC): ICD-10-CM

## 2023-06-19 DIAGNOSIS — R10.12 LEFT UPPER QUADRANT ABDOMINAL PAIN: ICD-10-CM

## 2023-06-19 DIAGNOSIS — K21.9 GASTROESOPHAGEAL REFLUX DISEASE WITHOUT ESOPHAGITIS: ICD-10-CM

## 2023-06-19 LAB — SL AMB POCT HEMOGLOBIN AIC: 6.9 (ref ?–6.5)

## 2023-06-19 PROCEDURE — 99214 OFFICE O/P EST MOD 30 MIN: CPT | Performed by: FAMILY MEDICINE

## 2023-06-19 PROCEDURE — 82570 ASSAY OF URINE CREATININE: CPT | Performed by: FAMILY MEDICINE

## 2023-06-19 PROCEDURE — 83036 HEMOGLOBIN GLYCOSYLATED A1C: CPT | Performed by: FAMILY MEDICINE

## 2023-06-19 PROCEDURE — 82043 UR ALBUMIN QUANTITATIVE: CPT | Performed by: FAMILY MEDICINE

## 2023-06-19 RX ORDER — SUCRALFATE 1 G/1
1 TABLET ORAL 4 TIMES DAILY
Qty: 28 TABLET | Refills: 0 | Status: SHIPPED | OUTPATIENT
Start: 2023-06-19 | End: 2023-06-26

## 2023-06-19 RX ORDER — UREA 10 %
500 LOTION (ML) TOPICAL ONCE
COMMUNITY

## 2023-06-19 RX ORDER — CYCLOBENZAPRINE HCL 10 MG
10 TABLET ORAL
Qty: 30 TABLET | Refills: 1 | Status: SHIPPED | OUTPATIENT
Start: 2023-06-19

## 2023-06-19 NOTE — PROGRESS NOTES
Name: Alvin Johnson      : 1950      MRN: [id number]  Encounter Provider: Opal Carranza MD  Encounter Date: 2023   Encounter department: [address] [place] [address]     1  Type 2 diabetes mellitus with diabetic neuropathy, without long-term current use of insulin ([facility])  -     POCT hemoglobin A1c  -     Albumin / creatinine urine ratio; Future; Expected date: 2023  -     sucralfate (CARAFATE) 1 g tablet; Take 1 tablet (1 g total) by mouth 4 (four) times a day for 7 days    2  Gastroesophageal reflux disease without esophagitis    3  Chronic obstructive pulmonary disease, unspecified COPD type ([facility] [address] )    4  Left upper quadrant abdominal pain  -     CT abdomen pelvis wo contrast; Future; Expected date: 2023    5  Cervical radiculopathy  -     cyclobenzaprine (FLEXERIL) 10 mg tablet; Take 1 tablet (10 mg total) by mouth daily at bedtime      Patient's diabetes is well controlled, hemoglobin A1c of 6 9 today  Concern regarding persistent abdominal pain in the left upper quadrant  Exam demonstrates significant tenderness over the left upper quadrant, no palpable mass, CT scan from 2023 the lung does not demonstrate any significant abnormality of the visualized structures on the left upper quadrant  Differential diagnosis include costochondritis, gastritis, gastric ulcer  We will start patient on Carafate for gastritis/gastric ulcer  Obtain CT scan of the abdomen for evaluation    Patient requesting refill for her Flexeril, this medication was discontinued and substituted with methocarbamol back in 2022  She use it for her neck pain to sleep at night  Pt Understands that taking this medication may increase risk of fall, patient would still like to use this medication       Subjective     HPI     79-year-old female patient presents for follow-up regarding her type 2 diabetes  Her hemoglobin A1c is 6 9 today    This is being stable compared to her last "evaluation  She reports compliance with medication  Her main concern today is regarding persistent left upper quadrant abdominal pain  Patient unable to recognize any specific triggers  Reports the pain is stabbing, sharp  She reports she does also have pain when she reached down on the right upper quadrant  Review of Systems   Constitutional: Negative for chills and fever  HENT: Negative for congestion, rhinorrhea and sore throat  Respiratory: Positive for cough  Cigarette cough   Cardiovascular: Negative for chest pain  Gastrointestinal: Positive for abdominal pain  Negative for abdominal distention, constipation, diarrhea, nausea and vomiting  Stool is now \"short and soft\"   Neurological: Negative for headaches         Past Medical History:   Diagnosis Date   • Asthma    • Back pain    • COPD (chronic obstructive pulmonary disease) ([facility])    • COPD (chronic obstructive pulmonary disease) (HCC)    • DVT (deep venous thrombosis) ([facility])    • GERD (gastroesophageal reflux disease)    • Hyperlipidemia    • Hypertension      Past Surgical History:   Procedure Laterality Date   • APPENDECTOMY     • BREAST BIOPSY     • BREAST CYST EXCISION Left    •  SECTION     • COLONOSCOPY     • ESOPHAGOGASTRODUODENOSCOPY     • HYSTERECTOMY     • OOPHORECTOMY Bilateral      Family History   Problem Relation Age of Onset   • Colon cancer Mother 43   • Esophageal cancer Father 64   • Skin cancer Sister    • No Known Problems Daughter    • No Known Problems Maternal Grandmother    • No Known Problems Maternal Grandfather    • No Known Problems Paternal Grandmother    • No Known Problems Paternal Grandfather    • No Known Problems Son      Social History     Socioeconomic History   • Marital status: Single     Spouse name: Not on file   • Number of children: Not on file   • Years of education: Not on file   • Highest education level: Not on file   Occupational History   • Not on file   Tobacco Use " • Smoking status: Every Day     Packs/day: 1 00     Years: 54 00     Total pack years: 54 00     Types: Cigarettes   • Smokeless tobacco: Never   Vaping Use   • Vaping Use: Never used   Substance and Sexual Activity   • Alcohol use: No   • Drug use: No   • Sexual activity: Not on file   Other Topics Concern   • Not on file   Social History Narrative   • Not on file     Social Determinants of Health     Financial Resource Strain: High Risk (8/18/2022)    Overall Financial Resource Strain (CARDIA)    • Difficulty of Paying Living Expenses: Hard   Food Insecurity: Not on file   Transportation Needs: No Transportation Needs (8/18/2022)    PRAPARE - Transportation    • Lack of Transportation (Medical): No    • Lack of Transportation (Non-Medical):  No   Physical Activity: Not on file   Stress: Not on file   Social Connections: Not on file   Intimate Partner Violence: Not on file   Housing Stability: Not on file     Current Outpatient Medications on File Prior to Visit   Medication Sig   • amLODIPine (NORVASC) 10 mg tablet Take 1 tablet (10 mg total) by mouth daily   • ascorbic acid (VITAMIN C) 500 MG tablet Take 500 mg by mouth daily   • atorvastatin (LIPITOR) 40 mg tablet Take 1 tablet (40 mg total) by mouth daily   • B Complex-C (SUPER B COMPLEX/VITAMIN C PO) Take by mouth   • Calcium Carbonate-Vitamin D 600-400 MG-UNIT per chew tablet Chew 1 tablet 2 (two) times a day for 90 days   • cetirizine (ZyrTEC) 10 mg tablet Take 10 mg by mouth daily   • Cranberry 450 MG TABS Take by mouth   • fluticasone-salmeterol (ADVAIR DISKUS) 250-50 mcg/dose inhaler Inhale 1 puff 2 (two) times a day (Patient taking differently: Inhale 1 puff 2 (two) times a day As needed)   • gabapentin (NEURONTIN) 300 mg capsule Take 1 capsule (300 mg total) by mouth 3 (three) times a day Please change from bid to tid   • magnesium gluconate (MAGONATE) 500 mg tablet Take 500 mg by mouth 1 (one) time   • metFORMIN (GLUCOPHAGE-XR) 500 mg 24 hr tablet TAKE 1 TABLET BY MOUTH EVERY DAY WITH DINNER   • Multiple Vitamins-Minerals (MULTIVITAMIN ADULT PO) Take by mouth   • pantoprazole (PROTONIX) 40 mg tablet TAKE 1 TABLET BY MOUTH EVERY DAY   • telmisartan (MICARDIS) 40 mg tablet Take 1 tablet (40 mg total) by mouth daily   • TURMERIC PO Take by mouth   • albuterol (2 5 mg/3 mL) 0 083 % nebulizer solution Take 1 vial (2 5 mg total) by nebulization every 4 (four) hours as needed for wheezing or shortness of breath (Patient not taking: Reported on 6/19/2023)   • albuterol (2 5 mg/3 mL) 0 083 % nebulizer solution Take 3 mL (2 5 mg total) by nebulization every 6 (six) hours as needed for wheezing or shortness of breath (Patient not taking: Reported on 6/19/2023)   • Cyanocobalamin (VITAMIN B-12 PO) Take by mouth (Patient not taking: Reported on 6/19/2023)   • methylPREDNISolone 4 MG tablet therapy pack Use as directed on package (Patient not taking: Reported on 11/29/2022)   • [DISCONTINUED] methocarbamol (ROBAXIN) 500 mg tablet Take 0 5 tablets (250 mg total) by mouth daily at bedtime (Patient not taking: Reported on 6/19/2023)     Allergies   Allergen Reactions   • Sulfa Antibiotics Rash     Immunization History   Administered Date(s) Administered   • COVID-19 MODERNA VACC 0 5 ML IM 03/18/2021, 04/15/2021, 10/25/2021   • INFLUENZA 11/19/2018, 10/02/2021, 10/04/2022   • Influenza Quadrivalent Preservative Free 3 years and older IM 11/08/2017   • Influenza Split High Dose Preservative Free IM 11/19/2018   • Influenza Whole 11/08/2012, 10/23/2013, 10/24/2014   • Influenza, Quadrivalent (nasal) 11/08/2017   • Influenza, high dose seasonal 0 7 mL 10/15/2019, 10/06/2020   • Influenza, seasonal, injectable 11/21/2016   • Influenza, seasonal, injectable, preservative free 11/02/2015   • Pneumococcal Conjugate 13-Valent 04/11/2019   • Pneumococcal Polysaccharide PPV23 05/06/2013, 11/05/2020   • Tdap 08/10/2016   • Zoster 06/02/2014   • Zoster Vaccine Recombinant 11/17/2021 "      Objective     /66 (BP Location: Right arm, Patient Position: Sitting, Cuff Size: Large)   Pulse 104   Temp 97 6 °F (36 4 °C) (Temporal)   Ht 5' 0 75\" (1 543 m)   Wt 81 6 kg (180 lb)   SpO2 94%   BMI 34 29 kg/m²     Physical Exam  Vitals reviewed  Constitutional:       General: She is not in acute distress  Appearance: Normal appearance  She is obese  She is not ill-appearing, toxic-appearing or diaphoretic  Cardiovascular:      Rate and Rhythm: Normal rate and regular rhythm  Pulses: no weak pulses          Dorsalis pedis pulses are 1+ on the right side and 1+ on the left side  Heart sounds: Normal heart sounds  No murmur heard  Pulmonary:      Effort: Pulmonary effort is normal       Breath sounds: Wheezing present  Comments: Mild wheezing through out  Abdominal:      Tenderness: There is abdominal tenderness  Comments: Tenderness over the left upper quadrant   Musculoskeletal:         General: No swelling or deformity  Feet:      Right foot:      Skin integrity: No ulcer, skin breakdown, erythema, warmth, callus or dry skin  Left foot:      Skin integrity: No ulcer, skin breakdown, erythema, warmth, callus or dry skin  Skin:     General: Skin is warm  Capillary Refill: Capillary refill takes less than 2 seconds  Neurological:      General: No focal deficit present  Mental Status: She is alert  Psychiatric:         Mood and Affect: Mood normal         Patient's shoes and socks removed  Right Foot/Ankle   Right Foot Inspection  Skin Exam: skin normal and skin intact  No dry skin, no warmth, no callus, no erythema, no maceration, no abnormal color, no pre-ulcer, no ulcer and no callus  Toe Exam: No swelling, no tenderness, erythema and  no right toe deformity    Sensory   Vibration: intact  Proprioception: intact  Monofilament testing: intact    Vascular  Capillary refills: < 3 seconds  The right DP pulse is 1+       Left Foot/Ankle  Left " Foot Inspection  Skin Exam: skin normal and skin intact  No dry skin, no warmth, no erythema, no maceration, normal color, no pre-ulcer, no ulcer and no callus  Toe Exam: No swelling, no tenderness, no erythema and no left toe deformity  Sensory   Vibration: intact  Proprioception: intact  Monofilament testing: intact    Vascular  Capillary refills: < 3 seconds  The left DP pulse is 1+       Assign Risk Category  No deformity present  No loss of protective sensation  No weak pulses  Risk: 0          Dominik Higgins MD

## 2023-06-20 LAB
CREAT UR-MCNC: 210 MG/DL
MICROALBUMIN UR-MCNC: 59.9 MG/L (ref 0–20)
MICROALBUMIN/CREAT 24H UR: 29 MG/G CREATININE (ref 0–30)

## 2023-06-22 DIAGNOSIS — I10 ESSENTIAL HYPERTENSION: ICD-10-CM

## 2023-06-22 RX ORDER — TELMISARTAN 40 MG/1
TABLET ORAL
Qty: 90 TABLET | Refills: 1 | Status: SHIPPED | OUTPATIENT
Start: 2023-06-22

## 2023-06-26 ENCOUNTER — HOSPITAL ENCOUNTER (OUTPATIENT)
Dept: RADIOLOGY | Facility: MEDICAL CENTER | Age: 73
Discharge: HOME | End: 2023-06-26
Payer: COMMERCIAL

## 2023-06-26 DIAGNOSIS — R10.12 LEFT UPPER QUADRANT ABDOMINAL PAIN: ICD-10-CM

## 2023-06-26 PROCEDURE — G1004 CDSM NDSC: HCPCS

## 2023-06-26 PROCEDURE — 74176 CT ABD & PELVIS W/O CONTRAST: CPT

## 2023-07-14 DIAGNOSIS — E78.5 DYSLIPIDEMIA: ICD-10-CM

## 2023-07-14 RX ORDER — ATORVASTATIN CALCIUM 40 MG/1
TABLET, FILM COATED ORAL
Qty: 90 TABLET | Refills: 1 | Status: SHIPPED | OUTPATIENT
Start: 2023-07-14

## 2023-07-15 DIAGNOSIS — I10 ESSENTIAL HYPERTENSION: ICD-10-CM

## 2023-07-17 RX ORDER — AMLODIPINE BESYLATE 10 MG/1
TABLET ORAL
Qty: 90 TABLET | Refills: 1 | Status: SHIPPED | OUTPATIENT
Start: 2023-07-17

## 2023-08-22 DIAGNOSIS — R10.13 DYSPEPSIA: ICD-10-CM

## 2023-08-22 RX ORDER — PANTOPRAZOLE SODIUM 40 MG/1
TABLET, DELAYED RELEASE ORAL
Qty: 90 TABLET | Refills: 1 | Status: SHIPPED | OUTPATIENT
Start: 2023-08-22

## 2023-09-20 ENCOUNTER — OFFICE VISIT (OUTPATIENT)
Dept: FAMILY MEDICINE CLINIC | Facility: CLINIC | Age: 73
End: 2023-09-20
Payer: COMMERCIAL

## 2023-09-20 VITALS
TEMPERATURE: 97.5 F | WEIGHT: 178 LBS | SYSTOLIC BLOOD PRESSURE: 132 MMHG | DIASTOLIC BLOOD PRESSURE: 72 MMHG | HEART RATE: 104 BPM | BODY MASS INDEX: 33.61 KG/M2 | OXYGEN SATURATION: 92 % | HEIGHT: 61 IN

## 2023-09-20 DIAGNOSIS — Z78.0 ASYMPTOMATIC MENOPAUSAL STATE: ICD-10-CM

## 2023-09-20 DIAGNOSIS — M54.12 CERVICAL RADICULOPATHY: ICD-10-CM

## 2023-09-20 DIAGNOSIS — N18.30 TYPE 2 DIABETES MELLITUS WITH STAGE 3 CHRONIC KIDNEY DISEASE, WITHOUT LONG-TERM CURRENT USE OF INSULIN (HCC): ICD-10-CM

## 2023-09-20 DIAGNOSIS — Z23 NEED FOR IMMUNIZATION AGAINST INFLUENZA: ICD-10-CM

## 2023-09-20 DIAGNOSIS — E11.40 TYPE 2 DIABETES MELLITUS WITH DIABETIC NEUROPATHY, WITHOUT LONG-TERM CURRENT USE OF INSULIN (HCC): Primary | ICD-10-CM

## 2023-09-20 DIAGNOSIS — Z00.00 MEDICARE ANNUAL WELLNESS VISIT, SUBSEQUENT: ICD-10-CM

## 2023-09-20 DIAGNOSIS — G62.9 NEUROPATHY: ICD-10-CM

## 2023-09-20 DIAGNOSIS — E11.22 TYPE 2 DIABETES MELLITUS WITH STAGE 3 CHRONIC KIDNEY DISEASE, WITHOUT LONG-TERM CURRENT USE OF INSULIN (HCC): ICD-10-CM

## 2023-09-20 DIAGNOSIS — E78.5 DYSLIPIDEMIA: ICD-10-CM

## 2023-09-20 DIAGNOSIS — I10 ESSENTIAL HYPERTENSION: ICD-10-CM

## 2023-09-20 LAB — SL AMB POCT HEMOGLOBIN AIC: 6.9 (ref ?–6.5)

## 2023-09-20 PROCEDURE — G0439 PPPS, SUBSEQ VISIT: HCPCS | Performed by: FAMILY MEDICINE

## 2023-09-20 PROCEDURE — G0008 ADMIN INFLUENZA VIRUS VAC: HCPCS

## 2023-09-20 PROCEDURE — 83036 HEMOGLOBIN GLYCOSYLATED A1C: CPT | Performed by: FAMILY MEDICINE

## 2023-09-20 PROCEDURE — 90662 IIV NO PRSV INCREASED AG IM: CPT

## 2023-09-20 RX ORDER — CYCLOBENZAPRINE HCL 10 MG
10 TABLET ORAL
Qty: 90 TABLET | Refills: 0 | Status: SHIPPED | OUTPATIENT
Start: 2023-09-20

## 2023-09-20 RX ORDER — METFORMIN HYDROCHLORIDE 500 MG/1
500 TABLET, EXTENDED RELEASE ORAL
Qty: 90 TABLET | Refills: 1 | Status: SHIPPED | OUTPATIENT
Start: 2023-09-20

## 2023-09-20 RX ORDER — GABAPENTIN 300 MG/1
300 CAPSULE ORAL 3 TIMES DAILY
Qty: 270 CAPSULE | Refills: 0 | Status: SHIPPED | OUTPATIENT
Start: 2023-09-20

## 2023-09-20 NOTE — PATIENT INSTRUCTIONS
Medicare Preventive Visit Patient Instructions  Thank you for completing your Welcome to Medicare Visit or Medicare Annual Wellness Visit today. Your next wellness visit will be due in one year (9/20/2024). The screening/preventive services that you may require over the next 5-10 years are detailed below. Some tests may not apply to you based off risk factors and/or age. Screening tests ordered at today's visit but not completed yet may show as past due. Also, please note that scanned in results may not display below. Preventive Screenings:  Service Recommendations Previous Testing/Comments   Colorectal Cancer Screening  * Colonoscopy    * Fecal Occult Blood Test (FOBT)/Fecal Immunochemical Test (FIT)  * Fecal DNA/Cologuard Test  * Flexible Sigmoidoscopy Age: 43-73 years old   Colonoscopy: every 10 years (may be performed more frequently if at higher risk)  OR  FOBT/FIT: every 1 year  OR  Cologuard: every 3 years  OR  Sigmoidoscopy: every 5 years  Screening may be recommended earlier than age 39 if at higher risk for colorectal cancer. Also, an individualized decision between you and your healthcare provider will decide whether screening between the ages of 77-80 would be appropriate. Colonoscopy: 06/28/2016  FOBT/FIT: Not on file  Cologuard: Not on file  Sigmoidoscopy: Not on file    Screening Current     Breast Cancer Screening Age: 36 years old  Frequency: every 1-2 years  Not required if history of left and right mastectomy Mammogram: 05/03/2023    Screening Current   Cervical Cancer Screening Between the ages of 21-29, pap smear recommended once every 3 years. Between the ages of 32-69, can perform pap smear with HPV co-testing every 5 years.    Recommendations may differ for women with a history of total hysterectomy, cervical cancer, or abnormal pap smears in past. Pap Smear: Not on file    Screening Not Indicated   Hepatitis C Screening Once for adults born between 1945 and 1965  More frequently in patients at high risk for Hepatitis C Hep C Antibody: Not on file        Diabetes Screening 1-2 times per year if you're at risk for diabetes or have pre-diabetes Fasting glucose: 143 mg/dL (8/29/2022)  A1C: 6.9 (6/19/2023)  Screening Not Indicated  History Diabetes   Cholesterol Screening Once every 5 years if you don't have a lipid disorder. May order more often based on risk factors. Lipid panel: 08/29/2022    Screening Not Indicated  History Lipid Disorder     Other Preventive Screenings Covered by Medicare:  1. Abdominal Aortic Aneurysm (AAA) Screening: covered once if your at risk. You're considered to be at risk if you have a family history of AAA. 2. Lung Cancer Screening: covers low dose CT scan once per year if you meet all of the following conditions: (1) Age 48-67; (2) No signs or symptoms of lung cancer; (3) Current smoker or have quit smoking within the last 15 years; (4) You have a tobacco smoking history of at least 20 pack years (packs per day multiplied by number of years you smoked); (5) You get a written order from a healthcare provider. 3. Glaucoma Screening: covered annually if you're considered high risk: (1) You have diabetes OR (2) Family history of glaucoma OR (3)  aged 48 and older OR (3)  American aged 72 and older  3. Osteoporosis Screening: covered every 2 years if you meet one of the following conditions: (1) You're estrogen deficient and at risk for osteoporosis based off medical history and other findings; (2) Have a vertebral abnormality; (3) On glucocorticoid therapy for more than 3 months; (4) Have primary hyperparathyroidism; (5) On osteoporosis medications and need to assess response to drug therapy. · Last bone density test (DXA Scan): 05/22/2019.  5. HIV Screening: covered annually if you're between the age of 15-65. Also covered annually if you are younger than 13 and older than 72 with risk factors for HIV infection.  For pregnant patients, it is covered up to 3 times per pregnancy. Immunizations:  Immunization Recommendations   Influenza Vaccine Annual influenza vaccination during flu season is recommended for all persons aged >= 6 months who do not have contraindications   Pneumococcal Vaccine   * Pneumococcal conjugate vaccine = PCV13 (Prevnar 13), PCV15 (Vaxneuvance), PCV20 (Prevnar 20)  * Pneumococcal polysaccharide vaccine = PPSV23 (Pneumovax) Adults 20-63 years old: 1-3 doses may be recommended based on certain risk factors  Adults 72 years old: 1-2 doses may be recommended based off what pneumonia vaccine you previously received   Hepatitis B Vaccine 3 dose series if at intermediate or high risk (ex: diabetes, end stage renal disease, liver disease)   Tetanus (Td) Vaccine - COST NOT COVERED BY MEDICARE PART B Following completion of primary series, a booster dose should be given every 10 years to maintain immunity against tetanus. Td may also be given as tetanus wound prophylaxis. Tdap Vaccine - COST NOT COVERED BY MEDICARE PART B Recommended at least once for all adults. For pregnant patients, recommended with each pregnancy. Shingles Vaccine (Shingrix) - COST NOT COVERED BY MEDICARE PART B  2 shot series recommended in those aged 48 and above     Health Maintenance Due:      Topic Date Due   • Hepatitis C Screening  Never done   • Lung Cancer Screening  03/23/2024   • Breast Cancer Screening: Mammogram  05/03/2024   • Colorectal Cancer Screening  06/28/2026     Immunizations Due:      Topic Date Due   • COVID-19 Vaccine (4 - Moderna series) 12/20/2021   • Influenza Vaccine (1) 09/01/2023     Advance Directives   What are advance directives? Advance directives are legal documents that state your wishes and plans for medical care. These plans are made ahead of time in case you lose your ability to make decisions for yourself.  Advance directives can apply to any medical decision, such as the treatments you want, and if you want to donate organs. What are the types of advance directives? There are many types of advance directives, and each state has rules about how to use them. You may choose a combination of any of the following:  · Living will: This is a written record of the treatment you want. You can also choose which treatments you do not want, which to limit, and which to stop at a certain time. This includes surgery, medicine, IV fluid, and tube feedings. · Durable power of  for healthcare [facility]): This is a written record that states who you want to make healthcare choices for you when you are unable to make them for yourself. This person, called a proxy, is usually a family member or a friend. You may choose more than 1 proxy. · Do not resuscitate (DNR) order:  A DNR order is used in case your heart stops beating or you stop breathing. It is a request not to have certain forms of treatment, such as CPR. A DNR order may be included in other types of advance directives. · Medical directive: This covers the care that you want if you are in a coma, near death, or unable to make decisions for yourself. You can list the treatments you want for each condition. Treatment may include pain medicine, surgery, blood transfusions, dialysis, IV or tube feedings, and a ventilator (breathing machine). · Values history: This document has questions about your views, beliefs, and how you feel and think about life. This information can help others choose the care that you would choose. Why are advance directives important? An advance directive helps you control your care. Although spoken wishes may be used, it is better to have your wishes written down. Spoken wishes can be misunderstood, or not followed. Treatments may be given even if you do not want them. An advance directive may make it easier for your family to make difficult choices about your care.    Cigarette Smoking and Your Health   Risks to your health if you smoke:  Nicotine and other chemicals found in tobacco damage every cell in your body. Even if you are a light smoker, you have an increased risk for cancer, heart disease, and lung disease. If you are pregnant or have diabetes, smoking increases your risk for complications. Benefits to your health if you stop smoking:   · You decrease respiratory symptoms such as coughing, wheezing, and shortness of breath. · You reduce your risk for cancers of the lung, mouth, throat, kidney, bladder, pancreas, stomach, and cervix. If you already have cancer, you increase the benefits of chemotherapy. You also reduce your risk for cancer returning or a second cancer from developing. · You reduce your risk for heart disease, blood clots, heart attack, and stroke. · You reduce your risk for lung infections, and diseases such as pneumonia, asthma, chronic bronchitis, and emphysema. · Your circulation improves. More oxygen can be delivered to your body. If you have diabetes, you lower your risk for complications, such as kidney, artery, and eye diseases. You also lower your risk for nerve damage. Nerve damage can lead to amputations, poor vision, and blindness. · You improve your body's ability to heal and to fight infections. For more information and support to stop smoking:   · [employer] go[employer]  Phone: 1- 130 - 044-0175  Web Address: www.[employer]  Weight Management   Why it is important to manage your weight:  Being overweight increases your risk of health conditions such as heart disease, high blood pressure, type 2 diabetes, and certain types of cancer. It can also increase your risk for osteoarthritis, sleep apnea, and other respiratory problems. Aim for a slow, steady weight loss. Even a small amount of weight loss can lower your risk of health problems. How to lose weight safely:  A safe and healthy way to lose weight is to eat fewer calories and get regular exercise.  You can lose up about 1 pound a week by decreasing the number of calories you eat by 500 calories each day. Healthy meal plan for weight management:  A healthy meal plan includes a variety of foods, contains fewer calories, and helps you stay healthy. A healthy meal plan includes the following:  · Eat whole-grain foods more often. A healthy meal plan should contain fiber. Fiber is the part of grains, fruits, and vegetables that is not broken down by your body. Whole-grain foods are healthy and provide extra fiber in your diet. Some examples of whole-grain foods are whole-wheat breads and pastas, oatmeal, brown rice, and bulgur. · Eat a variety of vegetables every day. Include dark, leafy greens such as spinach, kale, angel greens, and mustard greens. Eat yellow and orange vegetables such as carrots, sweet potatoes, and winter squash. · Eat a variety of fruits every day. Choose fresh or canned fruit (canned in its own juice or light syrup) instead of juice. Fruit juice has very little or no fiber. · Eat low-fat dairy foods. Drink fat-free (skim) milk or 1% milk. Eat fat-free yogurt and low-fat cottage cheese. Try low-fat cheeses such as mozzarella and other reduced-fat cheeses. · Choose meat and other protein foods that are low in fat. Choose beans or other legumes such as split peas or lentils. Choose fish, skinless poultry (chicken or turkey), or lean cuts of red meat (beef or pork). Before you cook meat or poultry, cut off any visible fat. · Use less fat and oil. Try baking foods instead of frying them. Add less fat, such as margarine, sour cream, regular salad dressing and mayonnaise to foods. Eat fewer high-fat foods. Some examples of high-fat foods include french fries, doughnuts, ice cream, and cakes. · Eat fewer sweets. Limit foods and drinks that are high in sugar. This includes candy, cookies, regular soda, and sweetened drinks. Exercise:  Exercise at least 30 minutes per day on most days of the week.  Some examples of exercise include walking, biking, dancing, and swimming. You can also fit in more physical activity by taking the stairs instead of the elevator or parking farther away from stores. Ask your healthcare provider about the best exercise plan for you. © Copyright [employer] 2018 Information is for End User's use only and may not be sold, redistributed or otherwise used for commercial purposes.  All illustrations and images included in CareNotes® are the copyrighted property of [employer] or  [employer]

## 2023-09-20 NOTE — PROGRESS NOTES
Assessment and Plan:     Problem List Items Addressed This Visit        Endocrine    Type 2 diabetes mellitus with diabetic neuropathy, without long-term current use of insulin (HCC) - Primary    Relevant Medications    metFORMIN (GLUCOPHAGE-XR) 500 mg 24 hr tablet    Other Relevant Orders    POCT hemoglobin A1c (Completed)    Type 2 diabetes mellitus with stage 3 chronic kidney disease, without long-term current use of insulin (HCC)    Relevant Medications    metFORMIN (GLUCOPHAGE-XR) 500 mg 24 hr tablet       Cardiovascular and Mediastinum    Essential hypertension    Relevant Orders    Comprehensive metabolic panel       Nervous and Auditory    Neuropathy    Relevant Medications    gabapentin (NEURONTIN) 300 mg capsule   Other Visit Diagnoses     Medicare annual wellness visit, subsequent        Cervical radiculopathy        Relevant Medications    cyclobenzaprine (FLEXERIL) 10 mg tablet    Dyslipidemia        Relevant Orders    Lipid Panel with Direct LDL reflex    Asymptomatic menopausal state        Relevant Orders    DXA bone density spine hip and pelvis    Need for immunization against influenza        Relevant Orders    influenza vaccine, high-dose, PF 0.7 mL (FLUZONE HIGH-DOSE) (Completed)           Preventive health issues were discussed with patient, and age appropriate screening tests were ordered as noted in patient's After Visit Summary. Personalized health advice and appropriate referrals for health education or preventive services given if needed, as noted in patient's After Visit Summary. History of Present Illness:     Patient presents for a Medicare Wellness Visit    HPI     Patient here for annual Medicare wellness today, overall she is doing well with diabetes denies any hypoglycemic episodes. Requesting refill for her medication including gabapentin, Flexeril, metformin at this time.   Patient would like to have a 3-month supplies of Flexeril as she is having to  30 day supply at a time    Patient Care Team:  Madhavi Camilo MD as PCP - General (Family Medicine)     Review of Systems:     Review of Systems     Problem List:     Patient Active Problem List   Diagnosis   • Essential hypertension   • Hyperlipidemia   • COPD (chronic obstructive pulmonary disease) ([address])   • Snoring   • Daytime sleepiness   • Type 2 diabetes mellitus with diabetic neuropathy, without long-term current use of insulin ([address])   • Type 2 diabetes mellitus with stage 3 chronic kidney disease, without long-term current use of insulin (HCC)   • Neuropathy   • Screening for lung cancer   • GERD (gastroesophageal reflux disease)      Past Medical and Surgical History:     Past Medical History:   Diagnosis Date   • Asthma    • Back pain    • COPD (chronic obstructive pulmonary disease) ([address])    • COPD (chronic obstructive pulmonary disease) ([address])    • DVT (deep venous thrombosis) ([facility])    • GERD (gastroesophageal reflux disease)    • Hyperlipidemia    • Hypertension      Past Surgical History:   Procedure Laterality Date   • APPENDECTOMY     • BREAST BIOPSY     • BREAST CYST EXCISION Left    •  SECTION     • COLONOSCOPY     • ESOPHAGOGASTRODUODENOSCOPY     • HYSTERECTOMY     • OOPHORECTOMY Bilateral       Family History:     Family History   Problem Relation Age of Onset   • Colon cancer Mother 43   • Esophageal cancer Father 64   • Skin cancer Sister    • No Known Problems Daughter    • No Known Problems Maternal Grandmother    • No Known Problems Maternal Grandfather    • No Known Problems Paternal Grandmother    • No Known Problems Paternal Grandfather    • No Known Problems Son       Social History:     Social History     Socioeconomic History   • Marital status: Single     Spouse name: None   • Number of children: None   • Years of education: None   • Highest education level: None   Occupational History   • None   Tobacco Use   • Smoking status: Every Day     Packs/day: 1.00     Years: 54.00     Total pack years: 54.00     Types: Cigarettes   • Smokeless tobacco: Never   Vaping Use   • Vaping Use: Never used   Substance and Sexual Activity   • Alcohol use: No   • Drug use: No   • Sexual activity: None   Other Topics Concern   • None   Social History Narrative   • None     Social Determinants of Health     Financial Resource Strain: Low Risk  (9/20/2023)    Overall Financial Resource Strain (CARDIA)    • Difficulty of Paying Living Expenses: Not hard at all   Food Insecurity: Not on file   Transportation Needs: No Transportation Needs (9/20/2023)    PRAPARE - Transportation    • Lack of Transportation (Medical): No    • Lack of Transportation (Non-Medical):  No   Physical Activity: Not on file   Stress: Not on file   Social Connections: Not on file   Intimate Partner Violence: Not on file   Housing Stability: Not on file      Medications and Allergies:     Current Outpatient Medications   Medication Sig Dispense Refill   • amLODIPine (NORVASC) 10 mg tablet TAKE 1 TABLET BY MOUTH EVERY DAY 90 tablet 1   • ascorbic acid (VITAMIN C) 500 MG tablet Take 500 mg by mouth daily     • atorvastatin (LIPITOR) 40 mg tablet TAKE 1 TABLET BY MOUTH EVERY DAY 90 tablet 1   • B Complex-C (SUPER B COMPLEX/VITAMIN C PO) Take by mouth     • Calcium Carbonate-Vitamin D 600-400 MG-UNIT per chew tablet Chew 1 tablet 2 (two) times a day for 90 days 180 tablet 0   • cetirizine (ZyrTEC) 10 mg tablet Take 10 mg by mouth daily     • Cranberry 450 MG TABS Take by mouth     • cyclobenzaprine (FLEXERIL) 10 mg tablet Take 1 tablet (10 mg total) by mouth daily at bedtime 90 tablet 0   • gabapentin (NEURONTIN) 300 mg capsule Take 1 capsule (300 mg total) by mouth 3 (three) times a day Please change from bid to tid 270 capsule 0   • magnesium gluconate (MAGONATE) 500 mg tablet Take 500 mg by mouth 1 (one) time     • metFORMIN (GLUCOPHAGE-XR) 500 mg 24 hr tablet Take 1 tablet (500 mg total) by mouth daily with dinner 90 tablet 1   • Multiple Vitamins-Minerals (MULTIVITAMIN ADULT PO) Take by mouth     • pantoprazole (PROTONIX) 40 mg tablet TAKE 1 TABLET BY MOUTH EVERY DAY 90 tablet 1   • telmisartan (MICARDIS) 40 mg tablet TAKE 1 TABLET BY MOUTH EVERY DAY 90 tablet 1   • TURMERIC PO Take by mouth     • albuterol (2.5 mg/3 mL) 0.083 % nebulizer solution Take 1 vial (2.5 mg total) by nebulization every 4 (four) hours as needed for wheezing or shortness of breath (Patient not taking: Reported on 6/19/2023) 60 vial 5   • albuterol (2.5 mg/3 mL) 0.083 % nebulizer solution Take 3 mL (2.5 mg total) by nebulization every 6 (six) hours as needed for wheezing or shortness of breath (Patient not taking: Reported on 6/19/2023) 360 mL 0   • Cyanocobalamin (VITAMIN B-12 PO) Take by mouth (Patient not taking: Reported on 6/19/2023)     • fluticasone-salmeterol (ADVAIR DISKUS) 250-50 mcg/dose inhaler Inhale 1 puff 2 (two) times a day (Patient not taking: Reported on 9/20/2023) 3 Inhaler 1   • methylPREDNISolone 4 MG tablet therapy pack Use as directed on package (Patient not taking: Reported on 11/29/2022) 1 each 0   • sucralfate (CARAFATE) 1 g tablet Take 1 tablet (1 g total) by mouth 4 (four) times a day for 7 days (Patient not taking: Reported on 9/20/2023) 28 tablet 0     No current facility-administered medications for this visit.      Allergies   Allergen Reactions   • Sulfa Antibiotics Rash      Immunizations:     Immunization History   Administered Date(s) Administered   • COVID-19 MODERNA VACC 0.5 ML IM 03/18/2021, 04/15/2021, 10/25/2021   • INFLUENZA 11/08/2017, 11/19/2018, 10/02/2021, 10/04/2022   • Influenza Quadrivalent Preservative Free 3 years and older IM 11/08/2017   • Influenza Split High Dose Preservative Free IM 11/19/2018   • Influenza Whole 11/08/2012, 10/23/2013, 10/24/2014   • Influenza, Quadrivalent (nasal) 11/08/2017   • Influenza, high dose seasonal 0.7 mL 10/15/2019, 10/06/2020, 09/20/2023   • Influenza, seasonal, injectable 11/21/2016   • Influenza, seasonal, injectable, preservative free 11/02/2015   • Pneumococcal Conjugate 13-Valent 04/11/2019   • Pneumococcal Polysaccharide PPV23 05/06/2013, 11/05/2020   • Tdap 08/10/2016   • Zoster 06/02/2014   • Zoster Vaccine Recombinant 11/17/2021      Health Maintenance:         Topic Date Due   • Hepatitis C Screening  Never done   • Lung Cancer Screening  03/23/2024   • Breast Cancer Screening: Mammogram  05/03/2024   • Colorectal Cancer Screening  06/28/2026         Topic Date Due   • COVID-19 Vaccine (4 - Moderna series) 12/20/2021      Medicare Screening Tests and Risk Assessments:     Nithya Erickson is here for her Subsequent Wellness visit. Health Risk Assessment:   Patient rates overall health as good. Patient feels that their physical health rating is same. Patient is satisfied with their life. Eyesight was rated as same. Hearing was rated as same. Patient feels that their emotional and mental health rating is same. Patients states they are never, rarely angry. Patient states they are never, rarely unusually tired/fatigued. Pain experienced in the last 7 days has been some. Patient's pain rating has been 5/10. Patient states that she has experienced no weight loss or gain in last 6 months. Hip and back Worse in morning     Depression Screening:   PHQ-2 Score: 0      Fall Risk Screening: In the past year, patient has experienced: no history of falling in past year      Urinary Incontinence Screening:   Patient has not leaked urine accidently in the last six months. Home Safety:  Patient does not have trouble with stairs inside or outside of their home. Patient has working smoke alarms and has working carbon monoxide detector. Home safety hazards include: none. Nutrition:   Current diet is Diabetic. Medications:   Patient is currently taking over-the-counter supplements. OTC medications include: see medication list. Patient is able to manage medications.      Activities of Daily Living (ADLs)/Instrumental Activities of Daily Living (IADLs):   Walk and transfer into and out of bed and chair?: Yes  Dress and groom yourself?: Yes    Bathe or shower yourself?: Yes    Feed yourself? Yes  Do your laundry/housekeeping?: Yes  Manage your money, pay your bills and track your expenses?: Yes  Make your own meals?: Yes    Do your own shopping?: Yes    Previous Hospitalizations:   Any hospitalizations or ED visits within the last 12 months?: No      Advance Care Planning:   Living will: No    Advanced directive counseling given: Yes    Five wishes given: Yes      Cognitive Screening:   Provider or family/friend/caregiver concerned regarding cognition?: No    PREVENTIVE SCREENINGS      Cardiovascular Screening:    General: History Lipid Disorder and Risks and Benefits Discussed    Due for: Lipid Panel      Diabetes Screening:     General: History Diabetes and Risks and Benefits Discussed      Colorectal Cancer Screening:     General: Screening Current      Breast Cancer Screening:     General: Screening Current      Cervical Cancer Screening:    General: Screening Not Indicated      Osteoporosis Screening:    General: Risks and Benefits Discussed    Due for: DXA Axial      Abdominal Aortic Aneurysm (AAA) Screening:        General: Screening Not Indicated      Lung Cancer Screening:     General: Screening Current      Hepatitis C Screening:    General: Patient Declines    Hep C Screening Accepted: No     Screening, Brief Intervention, and Referral to Treatment (SBIRT)    Screening  Typical number of drinks in a day: 0  Typical number of drinks in a week: 0  Interpretation: Low risk drinking behavior.     AUDIT-C Screenin) How often did you have a drink containing alcohol in the past year? never  2) How many drinks did you have on a typical day when you were drinking in the past year? 0  3) How often did you have 6 or more drinks on one occasion in the past year? never    AUDIT-C Score: 0  Interpretation: Score 0-2 (female): Negative screen for alcohol misuse    Single Item Drug Screening:  How often have you used an illegal drug (including marijuana) or a prescription medication for non-medical reasons in the past year? never    Single Item Drug Screen Score: 0  Interpretation: Negative screen for possible drug use disorder    Other Counseling Topics:   Car/seat belt/driving safety, skin self-exam, sunscreen and calcium and vitamin D intake and regular weightbearing exercise. No results found.       /72 (BP Location: Left arm, Patient Position: Sitting, Cuff Size: Standard)   Pulse 104   Temp 97.5 °F (36.4 °C)   Ht 5' 0.75" (1.543 m)   Wt 80.7 kg (178 lb)   SpO2 92%   BMI 33.91 kg/m²       Phuong Samuels MD

## 2023-09-29 ENCOUNTER — APPOINTMENT (OUTPATIENT)
Dept: LAB | Facility: MEDICAL CENTER | Age: 73
End: 2023-09-29
Payer: COMMERCIAL

## 2023-09-29 DIAGNOSIS — I10 ESSENTIAL HYPERTENSION: ICD-10-CM

## 2023-09-29 DIAGNOSIS — E78.5 DYSLIPIDEMIA: ICD-10-CM

## 2023-09-29 LAB
ALBUMIN SERPL BCP-MCNC: 4.1 G/DL (ref 3.5–5)
ALP SERPL-CCNC: 55 U/L (ref 34–104)
ALT SERPL W P-5'-P-CCNC: 18 U/L (ref 7–52)
ANION GAP SERPL CALCULATED.3IONS-SCNC: 10 MMOL/L
AST SERPL W P-5'-P-CCNC: 18 U/L (ref 13–39)
BILIRUB SERPL-MCNC: 0.51 MG/DL (ref 0.2–1)
BUN SERPL-MCNC: 14 MG/DL (ref 5–25)
CALCIUM SERPL-MCNC: 9.2 MG/DL (ref 8.4–10.2)
CHLORIDE SERPL-SCNC: 100 MMOL/L (ref 96–108)
CHOLEST SERPL-MCNC: 114 MG/DL
CO2 SERPL-SCNC: 30 MMOL/L (ref 21–32)
CREAT SERPL-MCNC: 1.04 MG/DL (ref 0.6–1.3)
GFR SERPL CREATININE-BSD FRML MDRD: 54 ML/MIN/1.73SQ M
GLUCOSE P FAST SERPL-MCNC: 136 MG/DL (ref 65–99)
HDLC SERPL-MCNC: 39 MG/DL
LDLC SERPL CALC-MCNC: 30 MG/DL (ref 0–100)
POTASSIUM SERPL-SCNC: 4 MMOL/L (ref 3.5–5.3)
PROT SERPL-MCNC: 7.2 G/DL (ref 6.4–8.4)
SODIUM SERPL-SCNC: 140 MMOL/L (ref 135–147)
TRIGL SERPL-MCNC: 223 MG/DL

## 2023-09-29 PROCEDURE — 80053 COMPREHEN METABOLIC PANEL: CPT

## 2023-09-29 PROCEDURE — 36415 COLL VENOUS BLD VENIPUNCTURE: CPT

## 2023-09-29 PROCEDURE — 80061 LIPID PANEL: CPT

## 2023-12-13 ENCOUNTER — RA CDI HCC (OUTPATIENT)
Dept: OTHER | Facility: HOSPITAL | Age: 73
End: 2023-12-13

## 2023-12-13 NOTE — PROGRESS NOTES
Previous suggestion used  [address] [facility] coding opportunities       Chart reviewed, no opportunity found: [id number]        Patients Insurance     Medicare Insurance: [employer] Advantage

## 2023-12-15 DIAGNOSIS — M54.12 CERVICAL RADICULOPATHY: ICD-10-CM

## 2023-12-15 RX ORDER — CYCLOBENZAPRINE HCL 10 MG
10 TABLET ORAL
Qty: 90 TABLET | Refills: 0 | Status: SHIPPED | OUTPATIENT
Start: 2023-12-15 | End: 2023-12-20 | Stop reason: SDUPTHER

## 2023-12-20 ENCOUNTER — OFFICE VISIT (OUTPATIENT)
Dept: FAMILY MEDICINE CLINIC | Facility: CLINIC | Age: 73
End: 2023-12-20
Payer: COMMERCIAL

## 2023-12-20 VITALS
BODY MASS INDEX: 32.89 KG/M2 | HEART RATE: 116 BPM | OXYGEN SATURATION: 94 % | WEIGHT: 174.2 LBS | DIASTOLIC BLOOD PRESSURE: 58 MMHG | SYSTOLIC BLOOD PRESSURE: 126 MMHG | TEMPERATURE: 97.6 F | HEIGHT: 61 IN

## 2023-12-20 DIAGNOSIS — K76.89 LIVER CYST: ICD-10-CM

## 2023-12-20 DIAGNOSIS — E78.5 DYSLIPIDEMIA: ICD-10-CM

## 2023-12-20 DIAGNOSIS — N28.1 CYST OF LEFT KIDNEY: ICD-10-CM

## 2023-12-20 DIAGNOSIS — I10 ESSENTIAL HYPERTENSION: ICD-10-CM

## 2023-12-20 DIAGNOSIS — R35.0 URINE FREQUENCY: ICD-10-CM

## 2023-12-20 DIAGNOSIS — G62.9 NEUROPATHY: ICD-10-CM

## 2023-12-20 DIAGNOSIS — R30.0 BURNING WITH URINATION: ICD-10-CM

## 2023-12-20 DIAGNOSIS — M54.12 CERVICAL RADICULOPATHY: ICD-10-CM

## 2023-12-20 DIAGNOSIS — E11.40 TYPE 2 DIABETES MELLITUS WITH DIABETIC NEUROPATHY, WITHOUT LONG-TERM CURRENT USE OF INSULIN (HCC): Primary | ICD-10-CM

## 2023-12-20 LAB — SL AMB POCT HEMOGLOBIN AIC: 7.1 (ref ?–6.5)

## 2023-12-20 PROCEDURE — 87077 CULTURE AEROBIC IDENTIFY: CPT | Performed by: FAMILY MEDICINE

## 2023-12-20 PROCEDURE — 83036 HEMOGLOBIN GLYCOSYLATED A1C: CPT | Performed by: FAMILY MEDICINE

## 2023-12-20 PROCEDURE — 87086 URINE CULTURE/COLONY COUNT: CPT | Performed by: FAMILY MEDICINE

## 2023-12-20 PROCEDURE — 99214 OFFICE O/P EST MOD 30 MIN: CPT | Performed by: FAMILY MEDICINE

## 2023-12-20 PROCEDURE — 87186 SC STD MICRODIL/AGAR DIL: CPT | Performed by: FAMILY MEDICINE

## 2023-12-20 RX ORDER — TELMISARTAN 40 MG/1
40 TABLET ORAL DAILY
Qty: 90 TABLET | Refills: 1 | Status: SHIPPED | OUTPATIENT
Start: 2023-12-20

## 2023-12-20 RX ORDER — GABAPENTIN 300 MG/1
300 CAPSULE ORAL 3 TIMES DAILY
Qty: 270 CAPSULE | Refills: 0 | Status: SHIPPED | OUTPATIENT
Start: 2023-12-20

## 2023-12-20 RX ORDER — ATORVASTATIN CALCIUM 40 MG/1
40 TABLET, FILM COATED ORAL DAILY
Qty: 90 TABLET | Refills: 1 | Status: SHIPPED | OUTPATIENT
Start: 2023-12-20

## 2023-12-20 RX ORDER — AMLODIPINE BESYLATE 10 MG/1
10 TABLET ORAL DAILY
Qty: 90 TABLET | Refills: 1 | Status: SHIPPED | OUTPATIENT
Start: 2023-12-20

## 2023-12-20 RX ORDER — CYCLOBENZAPRINE HCL 10 MG
10 TABLET ORAL
Qty: 90 TABLET | Refills: 0 | Status: SHIPPED | OUTPATIENT
Start: 2023-12-20

## 2023-12-20 RX ORDER — CEPHALEXIN 500 MG/1
500 CAPSULE ORAL EVERY 6 HOURS SCHEDULED
Qty: 28 CAPSULE | Refills: 0 | Status: SHIPPED | OUTPATIENT
Start: 2023-12-20 | End: 2023-12-27

## 2023-12-20 NOTE — PROGRESS NOTES
Name: Irma Rodriguez      : 1950      MRN: [id number]  Encounter Provider: Edu Esquivel MD  Encounter Date: 2023   Encounter department: [facility]    Assessment & Plan     1. Type 2 diabetes mellitus with diabetic neuropathy, without long-term current use of insulin (HCC)  -     POCT hemoglobin A1c    2. Neuropathy  Comments:  stable on gabapentine  Orders:  -     gabapentin (NEURONTIN) 300 mg capsule; Take 1 capsule (300 mg total) by mouth 3 (three) times a day Please change from bid to tid    3. Dyslipidemia  Comments:  on statin  Orders:  -     atorvastatin (LIPITOR) 40 mg tablet; Take 1 tablet (40 mg total) by mouth daily    4. Essential hypertension  Comments:  stable  Orders:  -     amLODIPine (NORVASC) 10 mg tablet; Take 1 tablet (10 mg total) by mouth daily  -     telmisartan (MICARDIS) 40 mg tablet; Take 1 tablet (40 mg total) by mouth daily    5. Essential hypertension  Comments:  increase norvasc  Orders:  -     amLODIPine (NORVASC) 10 mg tablet; Take 1 tablet (10 mg total) by mouth daily  -     telmisartan (MICARDIS) 40 mg tablet; Take 1 tablet (40 mg total) by mouth daily    6. Cervical radiculopathy  -     cyclobenzaprine (FLEXERIL) 10 mg tablet; Take 1 tablet (10 mg total) by mouth daily at bedtime    7. Urine frequency  -     POCT urine dip auto non-scope  -     Urine culture; Future    8. Burning with urination  -     POCT urine dip auto non-scope  -     Urine culture; Future      Overall diabetes well-controlled, hemoglobin A1c was 7.1.  Please continue metformin 500 mg once a day  Blood pressure at goal at this time, continue medication including amlodipine and Micardis.  Continue taking atorvastatin 40 mg once a day for dyslipidemia.  Will refill gabapentin and Flexeril for neuropathy and cervical radiculopathy  Obtain urine culture for evaluation dysuria, increased frequency and urgency, start patient on antibiotic for treatment, Keflex for total of 7  days    Reviewed his CT scan from June together with patient, after discussion with patient we will obtain abdominal ultrasound for further evaluation      Depression Screening and Follow-up Plan: Patient was screened for depression during today's encounter. They screened negative with a PHQ-2 score of 0.        Subjective     HPI    73-year-old female patient presents for follow-up regarding her chronic medical conditions.  Patient was last seen in September 2023 for evaluation.  At that time her hemoglobin A1c is well-controlled 6.9 last eval, 7.1 today.  Patient has been compliant with her medications.  Denies any significant hypoglycemic episodes were side effect from metformin.    Patient report her left upper quadrant abdominal pain is improved.    Review the CT scan completed on 6/26/2023.    Patient has been experiencing roughly 4-day history of UTI symptoms.  Reports has increased frequency, urgency as well as burning sensation with urine.  Patient took over-the-counter medication, is making her urine orange.        Review of Systems   Constitutional:  Negative for chills and fever.   HENT:  Negative for congestion, rhinorrhea and sore throat.    Respiratory:  Negative for shortness of breath.    Cardiovascular:  Negative for chest pain.   Gastrointestinal:  Negative for abdominal pain.   Genitourinary:  Positive for dysuria, frequency and urgency. Negative for hematuria.   Neurological:  Negative for dizziness, light-headedness and headaches.   Psychiatric/Behavioral:  Negative for sleep disturbance.        Past Medical History:   Diagnosis Date    Asthma     Back pain     COPD (chronic obstructive pulmonary disease) (HCC)     COPD (chronic obstructive pulmonary disease) (HCC)     DVT (deep venous thrombosis) (HCC)     GERD (gastroesophageal reflux disease)     Hyperlipidemia     Hypertension      Past Surgical History:   Procedure Laterality Date    APPENDECTOMY      BREAST BIOPSY      BREAST CYST  EXCISION Left 2010     SECTION      COLONOSCOPY      ESOPHAGOGASTRODUODENOSCOPY      HYSTERECTOMY      OOPHORECTOMY Bilateral      Family History   Problem Relation Age of Onset    Colon cancer Mother 42    Esophageal cancer Father 56    Skin cancer Sister     No Known Problems Daughter     No Known Problems Maternal Grandmother     No Known Problems Maternal Grandfather     No Known Problems Paternal Grandmother     No Known Problems Paternal Grandfather     No Known Problems Son      Social History     Socioeconomic History    Marital status: Single     Spouse name: None    Number of children: None    Years of education: None    Highest education level: None   Occupational History    None   Tobacco Use    Smoking status: Every Day     Current packs/day: 1.00     Average packs/day: 1 pack/day for 54.0 years (54.0 ttl pk-yrs)     Types: Cigarettes    Smokeless tobacco: Never   Vaping Use    Vaping status: Never Used   Substance and Sexual Activity    Alcohol use: No    Drug use: No    Sexual activity: None   Other Topics Concern    None   Social History Narrative    None     Social Determinants of Health     Financial Resource Strain: Low Risk  (2023)    Overall Financial Resource Strain (CARDIA)     Difficulty of Paying Living Expenses: Not hard at all   Food Insecurity: Not on file   Transportation Needs: No Transportation Needs (2023)    PRAPARE - Transportation     Lack of Transportation (Medical): No     Lack of Transportation (Non-Medical): No   Physical Activity: Not on file   Stress: Not on file   Social Connections: Not on file   Intimate Partner Violence: Not on file   Housing Stability: Not on file     Current Outpatient Medications on File Prior to Visit   Medication Sig    ascorbic acid (VITAMIN C) 500 MG tablet Take 500 mg by mouth daily    B Complex-C (SUPER B COMPLEX/VITAMIN C PO) Take by mouth    cetirizine (ZyrTEC) 10 mg tablet Take 10 mg by mouth daily    Cranberry 450 MG TABS  Take by mouth    Cyanocobalamin (VITAMIN B-12 PO) Take by mouth    magnesium gluconate (MAGONATE) 500 mg tablet Take 500 mg by mouth 1 (one) time    metFORMIN (GLUCOPHAGE-XR) 500 mg 24 hr tablet Take 1 tablet (500 mg total) by mouth daily with dinner    Multiple Vitamins-Minerals (MULTIVITAMIN ADULT PO) Take by mouth    pantoprazole (PROTONIX) 40 mg tablet TAKE 1 TABLET BY MOUTH EVERY DAY    TURMERIC PO Take by mouth    [DISCONTINUED] amLODIPine (NORVASC) 10 mg tablet TAKE 1 TABLET BY MOUTH EVERY DAY    [DISCONTINUED] atorvastatin (LIPITOR) 40 mg tablet TAKE 1 TABLET BY MOUTH EVERY DAY    [DISCONTINUED] cyclobenzaprine (FLEXERIL) 10 mg tablet TAKE 1 TABLET BY MOUTH DAILY AT BEDTIME    [DISCONTINUED] gabapentin (NEURONTIN) 300 mg capsule Take 1 capsule (300 mg total) by mouth 3 (three) times a day Please change from bid to tid    [DISCONTINUED] telmisartan (MICARDIS) 40 mg tablet TAKE 1 TABLET BY MOUTH EVERY DAY    albuterol (2.5 mg/3 mL) 0.083 % nebulizer solution Take 1 vial (2.5 mg total) by nebulization every 4 (four) hours as needed for wheezing or shortness of breath    albuterol (2.5 mg/3 mL) 0.083 % nebulizer solution Take 3 mL (2.5 mg total) by nebulization every 6 (six) hours as needed for wheezing or shortness of breath    Calcium Carbonate-Vitamin D 600-400 MG-UNIT per chew tablet Chew 1 tablet 2 (two) times a day for 90 days    fluticasone-salmeterol (ADVAIR DISKUS) 250-50 mcg/dose inhaler Inhale 1 puff 2 (two) times a day    methylPREDNISolone 4 MG tablet therapy pack Use as directed on package    sucralfate (CARAFATE) 1 g tablet Take 1 tablet (1 g total) by mouth 4 (four) times a day for 7 days     Allergies   Allergen Reactions    Sulfa Antibiotics Rash     Immunization History   Administered Date(s) Administered    COVID-19 MODERNA VACC 0.5 ML IM 03/18/2021, 04/15/2021, 10/25/2021    INFLUENZA 11/08/2017, 11/19/2018, 10/02/2021, 10/04/2022    Influenza Quadrivalent Preservative Free 3 years and older  "IM 11/08/2017    Influenza Split High Dose Preservative Free IM 11/19/2018    Influenza Whole 11/08/2012, 10/23/2013, 10/24/2014    Influenza, Quadrivalent (nasal) 11/08/2017    Influenza, high dose seasonal 0.7 mL 10/15/2019, 10/06/2020, 09/20/2023    Influenza, seasonal, injectable 11/21/2016    Influenza, seasonal, injectable, preservative free 11/02/2015    Pneumococcal Conjugate 13-Valent 04/11/2019    Pneumococcal Polysaccharide PPV23 05/06/2013, 11/05/2020    Tdap 08/10/2016    Zoster 06/02/2014    Zoster Vaccine Recombinant 11/17/2021       Objective     /58   Pulse (!) 116   Temp 97.6 °F (36.4 °C)   Ht 5' 0.75\" (1.543 m)   Wt 79 kg (174 lb 3.2 oz)   SpO2 94%   BMI 33.19 kg/m²     Physical Exam  Vitals reviewed.   Constitutional:       General: She is not in acute distress.     Appearance: Normal appearance. She is not ill-appearing, toxic-appearing or diaphoretic.   Cardiovascular:      Rate and Rhythm: Normal rate and regular rhythm.      Pulses: Normal pulses.      Heart sounds: Normal heart sounds. No murmur heard.  Pulmonary:      Effort: Pulmonary effort is normal. No respiratory distress.      Breath sounds: Normal breath sounds.   Abdominal:      General: Abdomen is flat.      Palpations: Abdomen is soft.      Tenderness: There is no right CVA tenderness or left CVA tenderness.   Musculoskeletal:         General: No swelling or deformity.   Skin:     General: Skin is warm and dry.      Capillary Refill: Capillary refill takes less than 2 seconds.      Coloration: Skin is not jaundiced.   Neurological:      General: No focal deficit present.      Mental Status: She is alert and oriented to person, place, and time.   Psychiatric:         Mood and Affect: Mood normal.       Edu Esquivel MD    "

## 2023-12-22 LAB
BACTERIA UR CULT: ABNORMAL
BACTERIA UR CULT: ABNORMAL

## 2023-12-23 DIAGNOSIS — R10.13 DYSPEPSIA: ICD-10-CM

## 2023-12-26 RX ORDER — PANTOPRAZOLE SODIUM 40 MG/1
TABLET, DELAYED RELEASE ORAL
Qty: 90 TABLET | Refills: 1 | Status: SHIPPED | OUTPATIENT
Start: 2023-12-26

## 2023-12-27 ENCOUNTER — HOSPITAL ENCOUNTER (OUTPATIENT)
Dept: RADIOLOGY | Facility: MEDICAL CENTER | Age: 73
Discharge: HOME | End: 2023-12-27
Payer: COMMERCIAL

## 2023-12-27 DIAGNOSIS — Z13.820 SCREENING FOR OSTEOPOROSIS: ICD-10-CM

## 2023-12-27 DIAGNOSIS — Z78.0 ASYMPTOMATIC MENOPAUSAL STATE: ICD-10-CM

## 2023-12-27 PROCEDURE — 77080 DXA BONE DENSITY AXIAL: CPT

## 2024-01-16 ENCOUNTER — HOSPITAL ENCOUNTER (OUTPATIENT)
Dept: RADIOLOGY | Facility: MEDICAL CENTER | Age: 74
Discharge: HOME | End: 2024-01-16
Payer: COMMERCIAL

## 2024-01-16 DIAGNOSIS — N28.1 CYST OF LEFT KIDNEY: ICD-10-CM

## 2024-01-16 DIAGNOSIS — K76.89 LIVER CYST: ICD-10-CM

## 2024-01-16 PROCEDURE — 76700 US EXAM ABDOM COMPLETE: CPT

## 2024-02-05 ENCOUNTER — VBI (OUTPATIENT)
Dept: ADMINISTRATIVE | Facility: OTHER | Age: 74
End: 2024-02-05

## 2024-03-12 DIAGNOSIS — E11.22 TYPE 2 DIABETES MELLITUS WITH STAGE 3 CHRONIC KIDNEY DISEASE, WITHOUT LONG-TERM CURRENT USE OF INSULIN (HCC): ICD-10-CM

## 2024-03-12 DIAGNOSIS — N18.30 TYPE 2 DIABETES MELLITUS WITH STAGE 3 CHRONIC KIDNEY DISEASE, WITHOUT LONG-TERM CURRENT USE OF INSULIN (HCC): ICD-10-CM

## 2024-03-12 RX ORDER — METFORMIN HYDROCHLORIDE 500 MG/1
500 TABLET, EXTENDED RELEASE ORAL
Qty: 90 TABLET | Refills: 1 | Status: SHIPPED | OUTPATIENT
Start: 2024-03-12 | End: 2024-03-20 | Stop reason: SDUPTHER

## 2024-03-15 DIAGNOSIS — M54.12 CERVICAL RADICULOPATHY: ICD-10-CM

## 2024-03-18 RX ORDER — CYCLOBENZAPRINE HCL 10 MG
10 TABLET ORAL
Qty: 90 TABLET | Refills: 0 | Status: SHIPPED | OUTPATIENT
Start: 2024-03-18 | End: 2024-03-20 | Stop reason: SDUPTHER

## 2024-03-20 ENCOUNTER — OFFICE VISIT (OUTPATIENT)
Dept: FAMILY MEDICINE CLINIC | Facility: CLINIC | Age: 74
End: 2024-03-20
Payer: COMMERCIAL

## 2024-03-20 VITALS
TEMPERATURE: 97.7 F | OXYGEN SATURATION: 93 % | BODY MASS INDEX: 33.72 KG/M2 | WEIGHT: 178.6 LBS | HEIGHT: 61 IN | HEART RATE: 108 BPM | DIASTOLIC BLOOD PRESSURE: 68 MMHG | SYSTOLIC BLOOD PRESSURE: 148 MMHG

## 2024-03-20 DIAGNOSIS — E11.22 TYPE 2 DIABETES MELLITUS WITH STAGE 3 CHRONIC KIDNEY DISEASE, WITHOUT LONG-TERM CURRENT USE OF INSULIN, UNSPECIFIED WHETHER STAGE 3A OR 3B CKD (HCC): Primary | ICD-10-CM

## 2024-03-20 DIAGNOSIS — N18.30 TYPE 2 DIABETES MELLITUS WITH STAGE 3 CHRONIC KIDNEY DISEASE, WITHOUT LONG-TERM CURRENT USE OF INSULIN (HCC): ICD-10-CM

## 2024-03-20 DIAGNOSIS — Z72.0 TOBACCO USE: ICD-10-CM

## 2024-03-20 DIAGNOSIS — I10 ESSENTIAL HYPERTENSION: ICD-10-CM

## 2024-03-20 DIAGNOSIS — M54.12 CERVICAL RADICULOPATHY: ICD-10-CM

## 2024-03-20 DIAGNOSIS — Z12.31 ENCOUNTER FOR SCREENING MAMMOGRAM FOR BREAST CANCER: ICD-10-CM

## 2024-03-20 DIAGNOSIS — E11.22 TYPE 2 DIABETES MELLITUS WITH STAGE 3 CHRONIC KIDNEY DISEASE, WITHOUT LONG-TERM CURRENT USE OF INSULIN (HCC): ICD-10-CM

## 2024-03-20 DIAGNOSIS — N18.30 TYPE 2 DIABETES MELLITUS WITH STAGE 3 CHRONIC KIDNEY DISEASE, WITHOUT LONG-TERM CURRENT USE OF INSULIN, UNSPECIFIED WHETHER STAGE 3A OR 3B CKD (HCC): Primary | ICD-10-CM

## 2024-03-20 DIAGNOSIS — J44.9 CHRONIC OBSTRUCTIVE PULMONARY DISEASE, UNSPECIFIED COPD TYPE (HCC): ICD-10-CM

## 2024-03-20 PROBLEM — F17.200 NICOTINE DEPENDENCE: Status: ACTIVE | Noted: 2024-03-20

## 2024-03-20 LAB — SL AMB POCT HEMOGLOBIN AIC: 7.2 (ref ?–6.5)

## 2024-03-20 PROCEDURE — 83036 HEMOGLOBIN GLYCOSYLATED A1C: CPT | Performed by: FAMILY MEDICINE

## 2024-03-20 PROCEDURE — 99214 OFFICE O/P EST MOD 30 MIN: CPT | Performed by: FAMILY MEDICINE

## 2024-03-20 RX ORDER — METFORMIN HYDROCHLORIDE 500 MG/1
500 TABLET, EXTENDED RELEASE ORAL 2 TIMES DAILY WITH MEALS
Qty: 180 TABLET | Refills: 1 | Status: SHIPPED | OUTPATIENT
Start: 2024-03-20

## 2024-03-20 RX ORDER — CYCLOBENZAPRINE HCL 10 MG
10 TABLET ORAL
Qty: 90 TABLET | Refills: 0 | Status: SHIPPED | OUTPATIENT
Start: 2024-03-20

## 2024-03-20 NOTE — PROGRESS NOTES
Name: Irma Rodriguez      : 1950      MRN: [id number]  Encounter Provider: Edu Esquivel MD  Encounter Date: 3/20/2024   Encounter department: [facility]    Assessment & Plan     1. Type 2 diabetes mellitus with stage 3 chronic kidney disease, without long-term current use of insulin, unspecified whether stage 3a or 3b CKD (HCC)  -     POCT hemoglobin A1c    2. Encounter for screening mammogram for breast cancer  -     Mammo screening bilateral w 3d & cad; Future    3. Cervical radiculopathy  -     cyclobenzaprine (FLEXERIL) 10 mg tablet; Take 1 tablet (10 mg total) by mouth daily at bedtime    4. Chronic obstructive pulmonary disease, unspecified COPD type (HCC)    5. Essential hypertension    6. Tobacco use    7. Type 2 diabetes mellitus with stage 3 chronic kidney disease, without long-term current use of insulin (HCC)  Comments:  rto 3 months.  she is aware her prediabetes a1c in creased to type 2 dm  Orders:  -     metFORMIN (GLUCOPHAGE-XR) 500 mg 24 hr tablet; Take 1 tablet (500 mg total) by mouth 2 (two) times a day with meals      Pt agreeable with increasing metformin from 500mg daily to twice a day.   Patient still not ready to quit smoking  COPD symptom stable, encourage patient to use her nebulizer if she has significant respiratory distress  Refilled flexril at this time per patient request    Tobacco Cessation Counseling: Tobacco cessation counseling and education was provided. The patient is sincerely urged to quit consumption of tobacco. She is not ready to quit tobacco. The numerous health risks of tobacco consumption were discussed. If she decides to quit, there are a number of helpful adjunctive aids, and she can see me to discuss nicotine replacement therapy, chantix, or bupropion anytime in the future.           Subjective     HPI    73-year-old female patient presents for follow-up regarding her chronic medical conditions.  Patient reports she has been compliant  with her diabetes medication, metformin 500 mg once a day.  Patient does have baseline COPD, has mild respiratory symptoms however reports this is stable.  Has not used any of the inhalers including Advair, albuterol nebulizer etc.  Patient continues to use cigarettes.  About a pack a day.  Not interested in quitting at this time.  Patient requesting refill on Flexeril.   Hba1c is 7.2 today.         Review of Systems   Constitutional:  Negative for chills and fever.   HENT:  Positive for rhinorrhea. Negative for congestion and sore throat.    Respiratory:  Positive for cough. Negative for chest tightness and shortness of breath.    Cardiovascular:  Negative for chest pain.   Gastrointestinal:  Positive for diarrhea. Negative for abdominal pain, constipation, nausea and vomiting.   Musculoskeletal:  Positive for back pain and neck pain (from all the driving i did yesterday.).   Allergic/Immunologic: Positive for environmental allergies (takes zyrtec).   Neurological:  Negative for dizziness, light-headedness and headaches.       Past Medical History:   Diagnosis Date    Asthma     Back pain     COPD (chronic obstructive pulmonary disease) (HCC)     COPD (chronic obstructive pulmonary disease) (HCC)     DVT (deep venous thrombosis) (HCC)     GERD (gastroesophageal reflux disease)     Hyperlipidemia     Hypertension      Past Surgical History:   Procedure Laterality Date    APPENDECTOMY      BREAST BIOPSY      BREAST CYST EXCISION Left 2010     SECTION      COLONOSCOPY      ESOPHAGOGASTRODUODENOSCOPY      HYSTERECTOMY      OOPHORECTOMY Bilateral      Family History   Problem Relation Age of Onset    Colon cancer Mother 42    Esophageal cancer Father 56    Skin cancer Sister     No Known Problems Daughter     No Known Problems Maternal Grandmother     No Known Problems Maternal Grandfather     No Known Problems Paternal Grandmother     No Known Problems Paternal Grandfather     No Known Problems Son      Social  History     Socioeconomic History    Marital status: Single     Spouse name: None    Number of children: None    Years of education: None    Highest education level: None   Occupational History    None   Tobacco Use    Smoking status: Every Day     Current packs/day: 1.00     Average packs/day: 1 pack/day for 54.0 years (54.0 ttl pk-yrs)     Types: Cigarettes    Smokeless tobacco: Never   Vaping Use    Vaping status: Never Used   Substance and Sexual Activity    Alcohol use: No    Drug use: No    Sexual activity: None   Other Topics Concern    None   Social History Narrative    None     Social Determinants of Health     Financial Resource Strain: Low Risk  (9/20/2023)    Overall Financial Resource Strain (CARDIA)     Difficulty of Paying Living Expenses: Not hard at all   Food Insecurity: Not on file   Transportation Needs: No Transportation Needs (9/20/2023)    PRAPARE - Transportation     Lack of Transportation (Medical): No     Lack of Transportation (Non-Medical): No   Physical Activity: Not on file   Stress: Not on file   Social Connections: Not on file   Intimate Partner Violence: Not on file   Housing Stability: Not on file     Current Outpatient Medications on File Prior to Visit   Medication Sig    albuterol (2.5 mg/3 mL) 0.083 % nebulizer solution Take 1 vial (2.5 mg total) by nebulization every 4 (four) hours as needed for wheezing or shortness of breath    albuterol (2.5 mg/3 mL) 0.083 % nebulizer solution Take 3 mL (2.5 mg total) by nebulization every 6 (six) hours as needed for wheezing or shortness of breath    amLODIPine (NORVASC) 10 mg tablet Take 1 tablet (10 mg total) by mouth daily    ascorbic acid (VITAMIN C) 500 MG tablet Take 500 mg by mouth daily    atorvastatin (LIPITOR) 40 mg tablet Take 1 tablet (40 mg total) by mouth daily    B Complex-C (SUPER B COMPLEX/VITAMIN C PO) Take by mouth    Calcium Carbonate-Vitamin D 600-400 MG-UNIT per chew tablet Chew 1 tablet 2 (two) times a day for 90 days     cetirizine (ZyrTEC) 10 mg tablet Take 10 mg by mouth daily    Cranberry 450 MG TABS Take by mouth    Cyanocobalamin (VITAMIN B-12 PO) Take by mouth    fluticasone-salmeterol (ADVAIR DISKUS) 250-50 mcg/dose inhaler Inhale 1 puff 2 (two) times a day    gabapentin (NEURONTIN) 300 mg capsule Take 1 capsule (300 mg total) by mouth 3 (three) times a day Please change from bid to tid    magnesium gluconate (MAGONATE) 500 mg tablet Take 500 mg by mouth 1 (one) time    methylPREDNISolone 4 MG tablet therapy pack Use as directed on package    Multiple Vitamins-Minerals (MULTIVITAMIN ADULT PO) Take by mouth    pantoprazole (PROTONIX) 40 mg tablet TAKE 1 TABLET BY MOUTH EVERY DAY    sucralfate (CARAFATE) 1 g tablet Take 1 tablet (1 g total) by mouth 4 (four) times a day for 7 days    telmisartan (MICARDIS) 40 mg tablet Take 1 tablet (40 mg total) by mouth daily    TURMERIC PO Take by mouth    [DISCONTINUED] cyclobenzaprine (FLEXERIL) 10 mg tablet TAKE 1 TABLET BY MOUTH DAILY AT BEDTIME    [DISCONTINUED] metFORMIN (GLUCOPHAGE-XR) 500 mg 24 hr tablet TAKE 1 TABLET BY MOUTH EVERY DAY WITH DINNER     Allergies   Allergen Reactions    Sulfa Antibiotics Rash     Immunization History   Administered Date(s) Administered    COVID-19 MODERNA VACC 0.5 ML IM 03/18/2021, 04/15/2021, 10/25/2021    INFLUENZA 11/08/2017, 11/19/2018, 10/02/2021, 10/04/2022    Influenza Quadrivalent Preservative Free 3 years and older IM 11/08/2017    Influenza Split High Dose Preservative Free IM 11/19/2018    Influenza Whole 11/08/2012, 10/23/2013, 10/24/2014    Influenza, Quadrivalent (nasal) 11/08/2017    Influenza, high dose seasonal 0.7 mL 10/15/2019, 10/06/2020, 09/20/2023    Influenza, seasonal, injectable 11/21/2016    Influenza, seasonal, injectable, preservative free 11/02/2015    Pneumococcal Conjugate 13-Valent 04/11/2019    Pneumococcal Polysaccharide PPV23 05/06/2013, 11/05/2020    Respiratory Syncytial Virus Vaccine (Recombinant, Adjuvanted)  "12/27/2023    Tdap 08/10/2016    Zoster 06/02/2014    Zoster Vaccine Recombinant 11/17/2021, 01/18/2022       Objective     /68 (BP Location: Left arm, Patient Position: Sitting, Cuff Size: Standard)   Pulse (!) 108   Temp 97.7 °F (36.5 °C)   Ht 5' 0.75\" (1.543 m)   Wt 81 kg (178 lb 9.6 oz)   SpO2 93%   BMI 34.02 kg/m²     Physical Exam  Vitals reviewed.   Constitutional:       General: She is not in acute distress.     Appearance: Normal appearance. She is obese. She is not ill-appearing.   Cardiovascular:      Rate and Rhythm: Normal rate and regular rhythm.      Pulses: Normal pulses.      Heart sounds: Normal heart sounds. No murmur heard.  Pulmonary:      Effort: Pulmonary effort is normal. No respiratory distress.      Breath sounds: Normal breath sounds.      Comments: Hoarse voice  Abdominal:      General: Abdomen is flat.   Musculoskeletal:         General: No swelling or deformity.   Skin:     General: Skin is warm and dry.      Capillary Refill: Capillary refill takes less than 2 seconds.      Coloration: Skin is not jaundiced.   Neurological:      General: No focal deficit present.      Mental Status: She is alert.   Psychiatric:         Mood and Affect: Mood normal.       Edu Esquivel MD    "

## 2024-04-15 ENCOUNTER — CLINICAL SUPPORT (OUTPATIENT)
Dept: FAMILY MEDICINE CLINIC | Facility: CLINIC | Age: 74
End: 2024-04-15
Payer: COMMERCIAL

## 2024-04-15 VITALS — SYSTOLIC BLOOD PRESSURE: 128 MMHG | HEART RATE: 100 BPM | OXYGEN SATURATION: 94 % | DIASTOLIC BLOOD PRESSURE: 62 MMHG

## 2024-04-15 DIAGNOSIS — R03.0 ELEVATED BLOOD PRESSURE READING: Primary | ICD-10-CM

## 2024-04-15 PROCEDURE — 99211 OFF/OP EST MAY X REQ PHY/QHP: CPT

## 2024-05-15 ENCOUNTER — VBI (OUTPATIENT)
Dept: ADMINISTRATIVE | Facility: OTHER | Age: 74
End: 2024-05-15

## 2024-06-13 ENCOUNTER — RA CDI HCC (OUTPATIENT)
Dept: OTHER | Facility: HOSPITAL | Age: 74
End: 2024-06-13

## 2024-06-14 DIAGNOSIS — E78.5 DYSLIPIDEMIA: ICD-10-CM

## 2024-06-14 DIAGNOSIS — I10 ESSENTIAL HYPERTENSION: ICD-10-CM

## 2024-06-14 DIAGNOSIS — M54.12 CERVICAL RADICULOPATHY: ICD-10-CM

## 2024-06-14 RX ORDER — CYCLOBENZAPRINE HCL 10 MG
10 TABLET ORAL
Qty: 90 TABLET | Refills: 0 | Status: SHIPPED | OUTPATIENT
Start: 2024-06-14

## 2024-06-14 RX ORDER — AMLODIPINE BESYLATE 10 MG/1
10 TABLET ORAL DAILY
Qty: 90 TABLET | Refills: 1 | Status: SHIPPED | OUTPATIENT
Start: 2024-06-14

## 2024-06-14 RX ORDER — TELMISARTAN 40 MG/1
40 TABLET ORAL DAILY
Qty: 90 TABLET | Refills: 1 | Status: SHIPPED | OUTPATIENT
Start: 2024-06-14

## 2024-06-14 RX ORDER — ATORVASTATIN CALCIUM 40 MG/1
40 TABLET, FILM COATED ORAL DAILY
Qty: 90 TABLET | Refills: 1 | Status: SHIPPED | OUTPATIENT
Start: 2024-06-14

## 2024-06-15 DIAGNOSIS — E11.22 TYPE 2 DIABETES MELLITUS WITH STAGE 3 CHRONIC KIDNEY DISEASE, WITHOUT LONG-TERM CURRENT USE OF INSULIN (HCC): ICD-10-CM

## 2024-06-15 DIAGNOSIS — N18.30 TYPE 2 DIABETES MELLITUS WITH STAGE 3 CHRONIC KIDNEY DISEASE, WITHOUT LONG-TERM CURRENT USE OF INSULIN (HCC): ICD-10-CM

## 2024-06-16 RX ORDER — METFORMIN HYDROCHLORIDE 500 MG/1
500 TABLET, EXTENDED RELEASE ORAL 2 TIMES DAILY WITH MEALS
Qty: 180 TABLET | Refills: 1 | Status: SHIPPED | OUTPATIENT
Start: 2024-06-16

## 2024-06-20 ENCOUNTER — OFFICE VISIT (OUTPATIENT)
Dept: FAMILY MEDICINE CLINIC | Facility: CLINIC | Age: 74
End: 2024-06-20
Payer: COMMERCIAL

## 2024-06-20 VITALS
TEMPERATURE: 97.3 F | OXYGEN SATURATION: 93 % | WEIGHT: 176 LBS | BODY MASS INDEX: 33.23 KG/M2 | HEIGHT: 61 IN | SYSTOLIC BLOOD PRESSURE: 132 MMHG | DIASTOLIC BLOOD PRESSURE: 62 MMHG | HEART RATE: 112 BPM

## 2024-06-20 DIAGNOSIS — I10 ESSENTIAL HYPERTENSION: ICD-10-CM

## 2024-06-20 DIAGNOSIS — Z72.0 TOBACCO USE: ICD-10-CM

## 2024-06-20 DIAGNOSIS — J44.9 CHRONIC OBSTRUCTIVE PULMONARY DISEASE, UNSPECIFIED COPD TYPE (HCC): ICD-10-CM

## 2024-06-20 DIAGNOSIS — G62.9 NEUROPATHY: ICD-10-CM

## 2024-06-20 DIAGNOSIS — F17.210 NICOTINE DEPENDENCE, CIGARETTES, UNCOMPLICATED: ICD-10-CM

## 2024-06-20 DIAGNOSIS — E11.40 TYPE 2 DIABETES MELLITUS WITH DIABETIC NEUROPATHY, WITHOUT LONG-TERM CURRENT USE OF INSULIN (HCC): Primary | ICD-10-CM

## 2024-06-20 DIAGNOSIS — E78.5 HYPERLIPIDEMIA, UNSPECIFIED HYPERLIPIDEMIA TYPE: ICD-10-CM

## 2024-06-20 LAB
CREAT UR-MCNC: 319.8 MG/DL
MICROALBUMIN UR-MCNC: 44.2 MG/L
MICROALBUMIN/CREAT 24H UR: 14 MG/G CREATININE (ref 0–30)
SL AMB POCT HEMOGLOBIN AIC: 6.9 (ref ?–6.5)

## 2024-06-20 PROCEDURE — 82570 ASSAY OF URINE CREATININE: CPT | Performed by: FAMILY MEDICINE

## 2024-06-20 PROCEDURE — 82043 UR ALBUMIN QUANTITATIVE: CPT | Performed by: FAMILY MEDICINE

## 2024-06-20 PROCEDURE — 83036 HEMOGLOBIN GLYCOSYLATED A1C: CPT | Performed by: FAMILY MEDICINE

## 2024-06-20 PROCEDURE — 99214 OFFICE O/P EST MOD 30 MIN: CPT | Performed by: FAMILY MEDICINE

## 2024-06-20 RX ORDER — GABAPENTIN 300 MG/1
300 CAPSULE ORAL 3 TIMES DAILY
Qty: 270 CAPSULE | Refills: 1 | Status: SHIPPED | OUTPATIENT
Start: 2024-06-20

## 2024-06-20 RX ORDER — ALBUTEROL SULFATE 2.5 MG/3ML
2.5 SOLUTION RESPIRATORY (INHALATION) EVERY 4 HOURS PRN
Qty: 125 ML | Refills: 1 | Status: SHIPPED | OUTPATIENT
Start: 2024-06-20

## 2024-06-20 NOTE — PROGRESS NOTES
Ambulatory Visit  Name: Irma Rodriguez      : 1950      MRN: [id number]  Encounter Provider: Edu Esquivel MD  Encounter Date: 2024   Encounter department: [facility]    Assessment & Plan   1. Type 2 diabetes mellitus with diabetic neuropathy, without long-term current use of insulin (HCC)  -     POCT hemoglobin A1c  -     Albumin / creatinine urine ratio; Future; Expected date: 2024  -     albuterol (2.5 mg/3 mL) 0.083 % nebulizer solution; Take 3 mL (2.5 mg total) by nebulization every 4 (four) hours as needed for wheezing or shortness of breath  2. Neuropathy  Comments:  stable on gabapentine  Orders:  -     gabapentin (NEURONTIN) 300 mg capsule; Take 1 capsule (300 mg total) by mouth 3 (three) times a day Please change from bid to tid  3. Chronic obstructive pulmonary disease, unspecified COPD type ([facility])  4. Tobacco use  -     CT lung screening program; Future; Expected date: 2024  5. Hyperlipidemia, unspecified hyperlipidemia type  6. Essential hypertension  7. Nicotine dependence, cigarettes, uncomplicated  -     CT lung screening program; Future; Expected date: 2024    She has diabetes stable, please continue current medication, diabetic foot exam completed at this time  Refill gabapentin for neuropathy  Encourage patient to use albuterol as needed for respiratory symptoms, patient agreeable with CT lung cancer screening, will provide order at this time  Continue atorvastatin for dyslipidemia  Blood pressure today is 132/62, near goal, blood pressure at home likely well-controlled         History of Present Illness     HPI    Irma is a 73-year-old female patient presents for follow-up regarding her chronic medical conditions.  Patient continues to have occasional muscle spasms in her abdomen, calves.  Patient does take Flexeril 10 mg at bedtime to help with the symptoms.  She has been compliant with her other medications, she does have some mild diarrhea  with metformin however reports this is tolerable.  Her neuropathy is controlled with gabapentin 300 mg 3 times a day.  Denies any recent worsening shortness of breath symptoms, has not required to use albuterol excessively.  Patient did not use Advair.  Oxygen saturation today 93%  Hba1c today 6.9 today.     CT lung cancer screening program completed in 2023 demonstrating a right upper lobe 23 mm calcified nodule  Overall BI-RADS 2 benign appearance, patient agreeable with repeating low-dose CT for lung cancer screening    Review of Systems   Constitutional:  Negative for chills and fever.   HENT:  Negative for congestion, rhinorrhea and sore throat.    Eyes:  Negative for itching.   Respiratory:  Positive for cough (smokers cough). Negative for chest tightness and shortness of breath.    Cardiovascular:  Negative for chest pain.   Gastrointestinal:  Negative for abdominal pain.   Musculoskeletal:  Positive for arthralgias and back pain (end of the tail bone and hip pain).   Allergic/Immunologic: Positive for environmental allergies.   Neurological:  Negative for dizziness, light-headedness and headaches.   Psychiatric/Behavioral:  Negative for sleep disturbance.      Past Medical History:   Diagnosis Date    Asthma     Back pain     COPD (chronic obstructive pulmonary disease) (HCC)     COPD (chronic obstructive pulmonary disease) (HCC)     DVT (deep venous thrombosis) (HCC)     GERD (gastroesophageal reflux disease)     Hyperlipidemia     Hypertension      Past Surgical History:   Procedure Laterality Date    APPENDECTOMY      BREAST BIOPSY      BREAST CYST EXCISION Left 2010     SECTION      COLONOSCOPY      ESOPHAGOGASTRODUODENOSCOPY      HYSTERECTOMY      OOPHORECTOMY Bilateral      Family History   Problem Relation Age of Onset    Colon cancer Mother 42    Esophageal cancer Father 56    Skin cancer Sister     No Known Problems Daughter     No Known Problems Maternal Grandmother     No Known  Problems Maternal Grandfather     No Known Problems Paternal Grandmother     No Known Problems Paternal Grandfather     No Known Problems Son      Social History     Tobacco Use    Smoking status: Every Day     Current packs/day: 1.00     Average packs/day: 1 pack/day for 54.0 years (54.0 ttl pk-yrs)     Types: Cigarettes    Smokeless tobacco: Never   Vaping Use    Vaping status: Never Used   Substance and Sexual Activity    Alcohol use: No    Drug use: No    Sexual activity: Not on file     Current Outpatient Medications on File Prior to Visit   Medication Sig    amLODIPine (NORVASC) 10 mg tablet TAKE 1 TABLET BY MOUTH EVERY DAY    ascorbic acid (VITAMIN C) 500 MG tablet Take 500 mg by mouth daily    atorvastatin (LIPITOR) 40 mg tablet TAKE 1 TABLET BY MOUTH EVERY DAY    B Complex-C (SUPER B COMPLEX/VITAMIN C PO) Take by mouth    Calcium Carbonate-Vitamin D 600-400 MG-UNIT per chew tablet Chew 1 tablet 2 (two) times a day for 90 days    cetirizine (ZyrTEC) 10 mg tablet Take 10 mg by mouth daily    Cranberry 450 MG TABS Take by mouth    Cyanocobalamin (VITAMIN B-12 PO) Take by mouth    cyclobenzaprine (FLEXERIL) 10 mg tablet TAKE 1 TABLET BY MOUTH DAILY AT BEDTIME    fluticasone-salmeterol (ADVAIR DISKUS) 250-50 mcg/dose inhaler Inhale 1 puff 2 (two) times a day    magnesium gluconate (MAGONATE) 500 mg tablet Take 500 mg by mouth 1 (one) time    metFORMIN (GLUCOPHAGE-XR) 500 mg 24 hr tablet TAKE 1 TABLET BY MOUTH TWICE A DAY WITH MEALS    methylPREDNISolone 4 MG tablet therapy pack Use as directed on package    Multiple Vitamins-Minerals (MULTIVITAMIN ADULT PO) Take by mouth    pantoprazole (PROTONIX) 40 mg tablet TAKE 1 TABLET BY MOUTH EVERY DAY    sucralfate (CARAFATE) 1 g tablet Take 1 tablet (1 g total) by mouth 4 (four) times a day for 7 days    telmisartan (MICARDIS) 40 mg tablet TAKE 1 TABLET BY MOUTH EVERY DAY    TURMERIC PO Take by mouth    [DISCONTINUED] albuterol (2.5 mg/3 mL) 0.083 % nebulizer solution  "Take 1 vial (2.5 mg total) by nebulization every 4 (four) hours as needed for wheezing or shortness of breath    [DISCONTINUED] albuterol (2.5 mg/3 mL) 0.083 % nebulizer solution Take 3 mL (2.5 mg total) by nebulization every 6 (six) hours as needed for wheezing or shortness of breath    [DISCONTINUED] gabapentin (NEURONTIN) 300 mg capsule Take 1 capsule (300 mg total) by mouth 3 (three) times a day Please change from bid to tid     Allergies   Allergen Reactions    Sulfa Antibiotics Rash     Immunization History   Administered Date(s) Administered    COVID-19 MODERNA VACC 0.5 ML IM 03/18/2021, 04/15/2021, 10/25/2021    INFLUENZA 11/08/2017, 11/19/2018, 10/02/2021, 10/04/2022    Influenza Quadrivalent Preservative Free 3 years and older IM 11/08/2017    Influenza Split High Dose Preservative Free IM 11/19/2018    Influenza Whole 11/08/2012, 10/23/2013, 10/24/2014    Influenza, Quadrivalent (nasal) 11/08/2017    Influenza, high dose seasonal 0.7 mL 10/15/2019, 10/06/2020, 09/20/2023    Influenza, seasonal, injectable 11/21/2016    Influenza, seasonal, injectable, preservative free 11/02/2015    Pneumococcal Conjugate 13-Valent 04/11/2019    Pneumococcal Polysaccharide PPV23 05/06/2013, 11/05/2020    Respiratory Syncytial Virus Vaccine (Recombinant, Adjuvanted) 12/27/2023    Tdap 08/10/2016    Zoster 06/02/2014    Zoster Vaccine Recombinant 11/17/2021, 01/18/2022     Objective     /62 (BP Location: Right arm, Patient Position: Sitting, Cuff Size: Standard)   Pulse (!) 112   Temp (!) 97.3 °F (36.3 °C)   Ht 5' 0.75\" (1.543 m)   Wt 79.8 kg (176 lb)   SpO2 93%   BMI 33.53 kg/m²     Physical Exam  Vitals reviewed.   Constitutional:       General: She is not in acute distress.     Appearance: Normal appearance. She is obese. She is not ill-appearing, toxic-appearing or diaphoretic.   Cardiovascular:      Rate and Rhythm: Regular rhythm. Tachycardia present.      Pulses: Normal pulses. no weak pulses.           " Dorsalis pedis pulses are 2+ on the right side and 2+ on the left side.      Heart sounds: Normal heart sounds. No murmur heard.     Comments: ~100 bmp likely from exertion from walking into appointment  Pulmonary:      Effort: Pulmonary effort is normal. No respiratory distress.      Breath sounds: Normal breath sounds.   Abdominal:      General: Abdomen is flat.   Musculoskeletal:         General: No swelling or deformity.      Right lower leg: Edema present.      Left lower leg: Edema present.      Comments: 1+ pitting edema, known issue   Feet:      Right foot:      Skin integrity: No ulcer, skin breakdown, erythema, warmth, callus or dry skin.      Left foot:      Skin integrity: No ulcer, skin breakdown, erythema, warmth, callus or dry skin.   Skin:     General: Skin is warm and dry.      Capillary Refill: Capillary refill takes less than 2 seconds.      Coloration: Skin is not jaundiced.   Neurological:      General: No focal deficit present.      Mental Status: She is alert.   Psychiatric:         Mood and Affect: Mood normal.       Patient's shoes and socks removed.    Right Foot/Ankle   Right Foot Inspection  Skin Exam: skin normal and skin intact. No dry skin, no warmth, no callus, no erythema, no maceration, no abnormal color, no pre-ulcer, no ulcer and no callus.     Toe Exam: No swelling, no tenderness, erythema and  no right toe deformity    Sensory   Vibration: intact  Proprioception: intact  Monofilament testing: intact    Vascular  Capillary refills: < 3 seconds  The right DP pulse is 2+.     Left Foot/Ankle  Left Foot Inspection  Skin Exam: skin normal and skin intact. No dry skin, no warmth, no erythema, no maceration, normal color, no pre-ulcer, no ulcer and no callus.     Toe Exam: No swelling, no tenderness, no erythema and no left toe deformity.     Sensory   Vibration: intact  Proprioception: intact  Monofilament testing: intact    Vascular  Capillary refills: < 3 seconds  The left DP pulse  is 2+.     Assign Risk Category  No deformity present  No loss of protective sensation  No weak pulses  Risk: 0

## 2024-08-19 DIAGNOSIS — R10.13 DYSPEPSIA: ICD-10-CM

## 2024-08-19 RX ORDER — PANTOPRAZOLE SODIUM 40 MG/1
TABLET, DELAYED RELEASE ORAL
Qty: 90 TABLET | Refills: 1 | Status: SHIPPED | OUTPATIENT
Start: 2024-08-19

## 2024-08-28 ENCOUNTER — HOSPITAL ENCOUNTER (OUTPATIENT)
Dept: RADIOLOGY | Facility: MEDICAL CENTER | Age: 74
Discharge: HOME | End: 2024-08-28
Payer: COMMERCIAL

## 2024-08-28 VITALS — HEIGHT: 61 IN | WEIGHT: 176 LBS | BODY MASS INDEX: 33.23 KG/M2

## 2024-08-28 DIAGNOSIS — Z12.31 ENCOUNTER FOR SCREENING MAMMOGRAM FOR BREAST CANCER: ICD-10-CM

## 2024-08-28 PROCEDURE — 77067 SCR MAMMO BI INCL CAD: CPT

## 2024-08-28 PROCEDURE — 77063 BREAST TOMOSYNTHESIS BI: CPT

## 2024-09-13 ENCOUNTER — RA CDI HCC (OUTPATIENT)
Dept: OTHER | Facility: HOSPITAL | Age: 74
End: 2024-09-13

## 2024-09-17 ENCOUNTER — VBI (OUTPATIENT)
Dept: ADMINISTRATIVE | Facility: OTHER | Age: 74
End: 2024-09-17

## 2024-09-17 NOTE — TELEPHONE ENCOUNTER
09/17/24 11:36 AM     Chart reviewed for Diabetic Eye Exam ; nothing is submitted to the patient's insurance at this time.     Mala Helms   [facility]

## 2024-09-17 NOTE — TELEPHONE ENCOUNTER
09/17/24 11:37 AM     Chart reviewed for Microalbumin Creatinine Urine Ratio OR Albumin Creatinine Urine Ratio  was/were submitted to the patient's insurance. R    Mala Helms   [facility]

## 2024-09-20 ENCOUNTER — OFFICE VISIT (OUTPATIENT)
Dept: FAMILY MEDICINE CLINIC | Facility: CLINIC | Age: 74
End: 2024-09-20
Payer: COMMERCIAL

## 2024-09-20 VITALS
HEART RATE: 108 BPM | WEIGHT: 174.8 LBS | BODY MASS INDEX: 33 KG/M2 | SYSTOLIC BLOOD PRESSURE: 140 MMHG | OXYGEN SATURATION: 93 % | DIASTOLIC BLOOD PRESSURE: 82 MMHG | HEIGHT: 61 IN | TEMPERATURE: 97.2 F

## 2024-09-20 DIAGNOSIS — E11.40 TYPE 2 DIABETES MELLITUS WITH DIABETIC NEUROPATHY, WITHOUT LONG-TERM CURRENT USE OF INSULIN (HCC): ICD-10-CM

## 2024-09-20 DIAGNOSIS — Z00.00 MEDICARE ANNUAL WELLNESS VISIT, SUBSEQUENT: Primary | ICD-10-CM

## 2024-09-20 DIAGNOSIS — M54.50 LUMBAR BACK PAIN: ICD-10-CM

## 2024-09-20 DIAGNOSIS — I10 ESSENTIAL HYPERTENSION: ICD-10-CM

## 2024-09-20 DIAGNOSIS — E78.5 DYSLIPIDEMIA: ICD-10-CM

## 2024-09-20 DIAGNOSIS — M54.12 CERVICAL RADICULOPATHY: ICD-10-CM

## 2024-09-20 DIAGNOSIS — J44.9 CHRONIC OBSTRUCTIVE PULMONARY DISEASE, UNSPECIFIED COPD TYPE (HCC): ICD-10-CM

## 2024-09-20 LAB — SL AMB POCT HEMOGLOBIN AIC: 7 (ref ?–6.5)

## 2024-09-20 PROCEDURE — G0439 PPPS, SUBSEQ VISIT: HCPCS | Performed by: FAMILY MEDICINE

## 2024-09-20 PROCEDURE — 99214 OFFICE O/P EST MOD 30 MIN: CPT | Performed by: FAMILY MEDICINE

## 2024-09-20 PROCEDURE — 83036 HEMOGLOBIN GLYCOSYLATED A1C: CPT | Performed by: FAMILY MEDICINE

## 2024-09-20 RX ORDER — CYCLOBENZAPRINE HCL 10 MG
10 TABLET ORAL
Qty: 90 TABLET | Refills: 0 | Status: SHIPPED | OUTPATIENT
Start: 2024-09-20

## 2024-09-20 NOTE — PROGRESS NOTES
Ambulatory Visit  Name: Irma Rodriguez      : 1950      MRN: [id number]  Encounter Provider: Edu Esquivel MD  Encounter Date: 2024   Encounter department: [facility]    Assessment & Plan  Type 2 diabetes mellitus with diabetic neuropathy, without long-term current use of insulin (HCC)    Lab Results   Component Value Date    HGBA1C 7.0 (A) 2024       Orders:    POCT hemoglobin A1c    Comprehensive metabolic panel; Future    CBC and differential; Future    Essential hypertension    Orders:    Comprehensive metabolic panel; Future    CBC and differential; Future    Chronic obstructive pulmonary disease, unspecified COPD type (HCC)    Lumbar back pain    Orders:    XR spine lumbar minimum 4 views non injury; Future    Medicare annual wellness visit, subsequent    Orders:    Lipid panel; Future    Comprehensive metabolic panel; Future    CBC and differential; Future    Dyslipidemia    Orders:    Lipid panel; Future    Cervical radiculopathy    Orders:    cyclobenzaprine (FLEXERIL) 10 mg tablet; Take 1 tablet (10 mg total) by mouth daily at bedtime       Amw completed  COPD stable     Preventive health issues were discussed with patient, and age appropriate screening tests were ordered as noted in patient's After Visit Summary. Personalized health advice and appropriate referrals for health education or preventive services given if needed, as noted in patient's After Visit Summary.    History of Present Illness       HPI     Irma is a 73-year-old female patient here for Medicare annual wellness visit.  Her hemoglobin A1c today is 7.0. last hba1c in 24 is 6.9. patient has been compliant with her medications, denies any low blood sugar episodes.   Patient reports she has bene coughing but believes its secondary to her cigarette use.         Patient Care Team:  Edu Esquivel MD as PCP - General (Family Medicine)    Review of Systems   Constitutional:  Negative for  "chills and fever.   HENT:  Negative for congestion, rhinorrhea and sore throat.    Respiratory:  Negative for shortness of breath.    Cardiovascular:  Negative for chest pain.   Gastrointestinal:  Negative for abdominal pain.   Musculoskeletal:  Negative for arthralgias.   Neurological:  Negative for dizziness, light-headedness and headaches.   Psychiatric/Behavioral:  Negative for sleep disturbance.        Annual Wellness Visit Questionnaire   Irma is here for her Subsequent Wellness visit.     Health Risk Assessment:   Patient rates overall health as good. Patient feels that their physical health rating is same. Patient is satisfied with their life. Eyesight was rated as slightly worse. Hearing was rated as same. Patient feels that their emotional and mental health rating is same. Patients states they are never, rarely angry. Patient states they are sometimes unusually tired/fatigued. Pain experienced in the last 7 days has been some. Patient's pain rating has been 10/10. Patient states that she has experienced no weight loss or gain in last 6 months. Some pain in the toes, cramping up.   Back pain \"some one stabs me at the bottom of my bones, out and down my legs\"    Fall Risk Screening:   In the past year, patient has experienced: no history of falling in past year      Urinary Incontinence Screening:   Patient has not leaked urine accidently in the last six months.     Home Safety:  Patient does not have trouble with stairs inside or outside of their home. Patient has working smoke alarms and has working carbon monoxide detector. Home safety hazards include: none.     Nutrition:   Current diet is Regular.     Medications:   Patient is currently taking over-the-counter supplements. OTC medications include: see medication list. Patient is able to manage medications.     Activities of Daily Living (ADLs)/Instrumental Activities of Daily Living (IADLs):   Walk and transfer into and out of bed and chair?: " Yes  Dress and groom yourself?: Yes    Bathe or shower yourself?: Yes    Feed yourself? Yes  Do your laundry/housekeeping?: Yes  Manage your money, pay your bills and track your expenses?: Yes  Make your own meals?: Yes    Do your own shopping?: Yes    Previous Hospitalizations:   Any hospitalizations or ED visits within the last 12 months?: No      Advance Care Planning:   Living will: Yes    Advanced directive: Yes      Cognitive Screening:   Provider or family/friend/caregiver concerned regarding cognition?: No    PREVENTIVE SCREENINGS      Cardiovascular Screening:    General: Screening Not Indicated and History Lipid Disorder      Diabetes Screening:     General: Screening Not Indicated and History Diabetes      Colorectal Cancer Screening:     General: Screening Current      Breast Cancer Screening:     General: Screening Current      Cervical Cancer Screening:    General: Screening Not Indicated      Osteoporosis Screening:    General: Screening Not Indicated      Abdominal Aortic Aneurysm (AAA) Screening:        General: Screening Not Indicated      Lung Cancer Screening:     General: Risks and Benefits Discussed      Hepatitis C Screening:    General: Patient Declines    Screening, Brief Intervention, and Referral to Treatment (SBIRT)    Screening      AUDIT-C Screenin) How often did you have a drink containing alcohol in the past year? never  2) How many drinks did you have on a typical day when you were drinking in the past year? 0  3) How often did you have 6 or more drinks on one occasion in the past year? never    AUDIT-C Score: 0  Interpretation: Score 0-2 (female): Negative screen for alcohol misuse    Single Item Drug Screening:  How often have you used an illegal drug (including marijuana) or a prescription medication for non-medical reasons in the past year? never    Single Item Drug Screen Score: 0  Interpretation: Negative screen for possible drug use disorder    Other Counseling Topics:  "  Car/seat belt/driving safety, skin self-exam, sunscreen and calcium and vitamin D intake and regular weightbearing exercise.     Social Determinants of Health     Financial Resource Strain: Low Risk  (9/20/2023)    Overall Financial Resource Strain (CARDIA)     Difficulty of Paying Living Expenses: Not hard at all   Food Insecurity: No Food Insecurity (9/20/2024)    Hunger Vital Sign     Worried About Running Out of Food in the Last Year: Never true     Ran Out of Food in the Last Year: Never true   Transportation Needs: No Transportation Needs (9/20/2024)    PRAPARE - Transportation     Lack of Transportation (Medical): No     Lack of Transportation (Non-Medical): No   Housing Stability: Low Risk  (9/20/2024)    Housing Stability Vital Sign     Unable to Pay for Housing in the Last Year: No     Number of Times Moved in the Last Year: 1     Homeless in the Last Year: No   Utilities: Not At Risk (9/20/2024)    [facility] Utilities     Threatened with loss of utilities: No     No results found.    Objective     /82 (BP Location: Left arm, Patient Position: Sitting, Cuff Size: Standard)   Pulse (!) 108   Temp (!) 97.2 °F (36.2 °C) (Temporal)   Ht 5' 0.75\" (1.543 m)   Wt 79.3 kg (174 lb 12.8 oz)   SpO2 93%   BMI 33.30 kg/m²     Physical Exam  Vitals reviewed.   Constitutional:       Appearance: Normal appearance.   Cardiovascular:      Rate and Rhythm: Normal rate.      Pulses: Normal pulses.   Pulmonary:      Effort: Pulmonary effort is normal.      Comments: Hoarse voice  Abdominal:      General: Abdomen is flat.   Musculoskeletal:         General: No swelling.   Skin:     General: Skin is warm and dry.      Capillary Refill: Capillary refill takes less than 2 seconds.      Coloration: Skin is not jaundiced.   Neurological:      General: No focal deficit present.      Mental Status: She is alert.         Edu Esquivel M.D.  Family Medicine    Please excuse any \"sound-alike\" errors that may have ocurred during the " process of dictation. Parts of this note have been dictated and there may be errors present in the transcription process. Thank you.

## 2024-09-20 NOTE — ASSESSMENT & PLAN NOTE
Lab Results   Component Value Date    HGBA1C 7.0 (A) 09/20/2024       Orders:    POCT hemoglobin A1c    Comprehensive metabolic panel; Future    CBC and differential; Future

## 2024-10-01 ENCOUNTER — APPOINTMENT (OUTPATIENT)
Dept: RADIOLOGY | Facility: MEDICAL CENTER | Age: 74
End: 2024-10-01
Payer: COMMERCIAL

## 2024-10-01 DIAGNOSIS — M54.50 LUMBAR BACK PAIN: ICD-10-CM

## 2024-10-01 PROCEDURE — 72110 X-RAY EXAM L-2 SPINE 4/>VWS: CPT

## 2024-10-22 LAB
LEFT EYE DIABETIC RETINOPATHY: NORMAL
RIGHT EYE DIABETIC RETINOPATHY: NORMAL

## 2024-11-12 ENCOUNTER — TELEPHONE (OUTPATIENT)
Dept: ADMINISTRATIVE | Facility: OTHER | Age: 74
End: 2024-11-12

## 2024-11-12 NOTE — TELEPHONE ENCOUNTER
----- Message from Naye JORGENSEN sent at 11/12/2024 11:08 AM EST -----  Regarding: Eye Exam- Nathaly MARTIN  11/12/24 11:08 AM    Hello, our patient Irma Rodriguez has had Diabetic Eye Exam completed/performed. Please assist in updating the patient chart by pulling the document from the Media Tab. The date of service is 10/22/2024.     Thank you,  Naye MARTIN

## 2024-11-12 NOTE — TELEPHONE ENCOUNTER
Upon review of the In Basket request we were able to locate, review, and update the patient chart as requested for Diabetic Eye Exam.    Any additional questions or concerns should be emailed to the Practice Liaisons via the appropriate education email address, please do not reply via In Basket.    Thank you  Fior Cobb MA   [facility]

## 2024-11-13 DIAGNOSIS — G62.9 NEUROPATHY: ICD-10-CM

## 2024-11-13 NOTE — TELEPHONE ENCOUNTER
Reason for call:   [x] Refill   [] Prior Auth  [] Other:     Office: GABRIEL MARTIN  [x] PCP/Provider - Edu Esquivel   [] Specialty/Provider -     Medication: gabapentin     Dose/Frequency: 300 mg/ 1 cap 3 times daily     Quantity: 90 day supply    Pharmacy: [facility] in [place]     Does the patient have enough for 3 days?   [x] Yes   [] No - Send as HP to POD

## 2024-11-14 RX ORDER — GABAPENTIN 300 MG/1
300 CAPSULE ORAL 3 TIMES DAILY
Qty: 270 CAPSULE | Refills: 0 | Status: SHIPPED | OUTPATIENT
Start: 2024-11-14

## 2024-11-25 ENCOUNTER — VBI (OUTPATIENT)
Dept: ADMINISTRATIVE | Facility: OTHER | Age: 74
End: 2024-11-25

## 2024-11-26 NOTE — TELEPHONE ENCOUNTER
11/25/24 7:02 PM     Chart reviewed for Hemoglobin A1c was/were submitted to the patient's insurance.     Mala Helms   [facility]    11/25/24 7:06 PM     Chart reviewed for Microalbumin Creatinine Urine Ratio OR Albumin Creatinine Urine Ratio  was/were submitted to the patient's insurance.     Mala Helms   [facility]

## 2024-11-27 ENCOUNTER — VBI (OUTPATIENT)
Dept: ADMINISTRATIVE | Facility: OTHER | Age: 74
End: 2024-11-27

## 2024-11-27 NOTE — TELEPHONE ENCOUNTER
11/27/24 12:42 PM     Chart reviewed for Diabetic Eye Exam was/were submitted to the patient's insurance.     Mala Helms   [facility]

## 2024-12-07 DIAGNOSIS — I10 ESSENTIAL HYPERTENSION: ICD-10-CM

## 2024-12-09 DIAGNOSIS — E78.5 DYSLIPIDEMIA: ICD-10-CM

## 2024-12-09 DIAGNOSIS — I10 ESSENTIAL HYPERTENSION: ICD-10-CM

## 2024-12-09 RX ORDER — TELMISARTAN 40 MG/1
40 TABLET ORAL DAILY
Qty: 30 TABLET | Refills: 0 | Status: SHIPPED | OUTPATIENT
Start: 2024-12-09

## 2024-12-10 RX ORDER — AMLODIPINE BESYLATE 10 MG/1
10 TABLET ORAL DAILY
Qty: 90 TABLET | Refills: 1 | Status: SHIPPED | OUTPATIENT
Start: 2024-12-10

## 2024-12-10 RX ORDER — ATORVASTATIN CALCIUM 40 MG/1
40 TABLET, FILM COATED ORAL DAILY
Qty: 90 TABLET | Refills: 0 | Status: SHIPPED | OUTPATIENT
Start: 2024-12-10

## 2024-12-11 DIAGNOSIS — M54.12 CERVICAL RADICULOPATHY: ICD-10-CM

## 2024-12-11 RX ORDER — CYCLOBENZAPRINE HCL 10 MG
10 TABLET ORAL
Qty: 90 TABLET | Refills: 1 | Status: SHIPPED | OUTPATIENT
Start: 2024-12-11

## 2024-12-11 NOTE — TELEPHONE ENCOUNTER
Reason for call: Completely out of medication   [x] Refill   [] Prior Auth  [] Other:     Office:   [x] PCP/Provider -   [] Specialty/Provider -     Medication: cyclobenzaprine    Dose/Frequency: 10 mg daily     Quantity: 90    Pharmacy: [facility] [place] on file     Does the patient have enough for 3 days?   [] Yes   [x] No - Send as HP to POD

## 2024-12-17 NOTE — TELEPHONE ENCOUNTER
Patient called to see if her refill for her medication went in yet. I let her know it was sent in on 12/11/24 for 90 tabs with 1 refill. I confirmed the pharmacy with her and she verbalized understanding.

## 2024-12-19 ENCOUNTER — VBI (OUTPATIENT)
Dept: ADMINISTRATIVE | Facility: OTHER | Age: 74
End: 2024-12-19

## 2024-12-19 NOTE — TELEPHONE ENCOUNTER
12/19/24 2:41 PM     Chart reviewed for Diabetic Eye Exam was/were submitted to the patient's insurance.     Sin Mancuso MA   [facility]

## 2024-12-20 ENCOUNTER — RA CDI HCC (OUTPATIENT)
Dept: OTHER | Facility: HOSPITAL | Age: 74
End: 2024-12-20

## 2024-12-30 ENCOUNTER — OFFICE VISIT (OUTPATIENT)
Dept: FAMILY MEDICINE CLINIC | Facility: CLINIC | Age: 74
End: 2024-12-30
Payer: COMMERCIAL

## 2024-12-30 VITALS
HEIGHT: 61 IN | TEMPERATURE: 97.8 F | OXYGEN SATURATION: 93 % | SYSTOLIC BLOOD PRESSURE: 124 MMHG | WEIGHT: 178.6 LBS | BODY MASS INDEX: 33.72 KG/M2 | DIASTOLIC BLOOD PRESSURE: 52 MMHG | HEART RATE: 99 BPM

## 2024-12-30 DIAGNOSIS — M54.12 CERVICAL RADICULOPATHY: ICD-10-CM

## 2024-12-30 DIAGNOSIS — E11.40 TYPE 2 DIABETES MELLITUS WITH DIABETIC NEUROPATHY, WITHOUT LONG-TERM CURRENT USE OF INSULIN (HCC): Primary | ICD-10-CM

## 2024-12-30 DIAGNOSIS — F17.210 SMOKING GREATER THAN 20 PACK YEARS: ICD-10-CM

## 2024-12-30 DIAGNOSIS — I10 ESSENTIAL HYPERTENSION: ICD-10-CM

## 2024-12-30 LAB — SL AMB POCT HEMOGLOBIN AIC: 7 (ref ?–6.5)

## 2024-12-30 PROCEDURE — 83036 HEMOGLOBIN GLYCOSYLATED A1C: CPT | Performed by: FAMILY MEDICINE

## 2024-12-30 PROCEDURE — 99214 OFFICE O/P EST MOD 30 MIN: CPT | Performed by: FAMILY MEDICINE

## 2024-12-30 RX ORDER — CYCLOBENZAPRINE HCL 10 MG
10 TABLET ORAL
Qty: 30 TABLET | Refills: 2 | Status: SHIPPED | OUTPATIENT
Start: 2024-12-30

## 2024-12-30 RX ORDER — TELMISARTAN 40 MG/1
40 TABLET ORAL DAILY
Qty: 90 TABLET | Refills: 2 | Status: SHIPPED | OUTPATIENT
Start: 2024-12-30

## 2024-12-30 NOTE — ASSESSMENT & PLAN NOTE
Repeat bp 124/52    Orders:    telmisartan (MICARDIS) 40 mg tablet; Take 1 tablet (40 mg total) by mouth daily

## 2024-12-30 NOTE — ASSESSMENT & PLAN NOTE
Lab Results   Component Value Date    HGBA1C 7.0 (A) 12/30/2024     Diabetes stable    Orders:    POCT hemoglobin A1c

## 2024-12-30 NOTE — PROGRESS NOTES
Name: Irma Rodriguez      : 1950      MRN: [id number]  Encounter Provider: Edu Esquivel MD  Encounter Date: 2024   Encounter department: [facility]    Assessment & Plan  Type 2 diabetes mellitus with diabetic neuropathy, without long-term current use of insulin ([facility])    Lab Results   Component Value Date    HGBA1C 7.0 (A) 2024     Diabetes stable    Orders:    POCT hemoglobin A1c    Smoking greater than 20 pack years  Pt not interested in CT lung cancer screening at this time       Cervical radiculopathy  Refilled Flexril 10mg as needed 30 tabs at a time    Orders:    cyclobenzaprine (FLEXERIL) 10 mg tablet; Take 1 tablet (10 mg total) by mouth daily at bedtime    Essential hypertension    Repeat bp 124/52    Orders:    telmisartan (MICARDIS) 40 mg tablet; Take 1 tablet (40 mg total) by mouth daily         History of Present Illness       HPI    Irma is a 74-year-old female patient presents for follow-up.  Patient reports overall she is doing well, hemoglobin A1c stable at 7.0.  She has been having some problems of pharmacy, insurance company will not feel her Flexeril 10 mg nightly day as a time and only will fill 30 tablets at a time.  Patient has been compliant with blood pressure medication.  Pt declines CT lung cancer screening. Not ready for tobacco cessation.   Pt continues to have a tobacco related cough but denies any sob or chest tightness.     Pt has cataract surgery scheduled for April.     Review of Systems   Constitutional:  Negative for appetite change, chills and fever.   HENT:  Negative for congestion, rhinorrhea and sore throat.    Respiratory:  Positive for cough. Negative for chest tightness and shortness of breath.    Cardiovascular:  Negative for chest pain.   Gastrointestinal:  Negative for abdominal pain.   Neurological:  Negative for dizziness, light-headedness and headaches.   Psychiatric/Behavioral:  Negative for sleep disturbance.           Past Medical History:   Diagnosis Date    Asthma     Back pain     COPD (chronic obstructive pulmonary disease) (HCC)     COPD (chronic obstructive pulmonary disease) (HCC)     DVT (deep venous thrombosis) (HCC)     GERD (gastroesophageal reflux disease)     Hyperlipidemia     Hypertension      Past Surgical History:   Procedure Laterality Date    APPENDECTOMY      BREAST BIOPSY      BREAST CYST EXCISION Left 2010     SECTION      COLONOSCOPY      ESOPHAGOGASTRODUODENOSCOPY      HYSTERECTOMY      OOPHORECTOMY Bilateral      Family History   Problem Relation Age of Onset    Colon cancer Mother 42    Esophageal cancer Father 56    Skin cancer Sister     No Known Problems Daughter     No Known Problems Maternal Grandmother     No Known Problems Maternal Grandfather     No Known Problems Paternal Grandmother     No Known Problems Paternal Grandfather     No Known Problems Son      Social History     Tobacco Use    Smoking status: Every Day     Current packs/day: 1.00     Average packs/day: 1 pack/day for 54.0 years (54.0 ttl pk-yrs)     Types: Cigarettes    Smokeless tobacco: Never   Vaping Use    Vaping status: Never Used   Substance and Sexual Activity    Alcohol use: No    Drug use: No    Sexual activity: Not on file     Current Outpatient Medications on File Prior to Visit   Medication Sig    amLODIPine (NORVASC) 10 mg tablet TAKE 1 TABLET BY MOUTH EVERY DAY    ascorbic acid (VITAMIN C) 500 MG tablet Take 500 mg by mouth daily    atorvastatin (LIPITOR) 40 mg tablet TAKE 1 TABLET BY MOUTH EVERY DAY    B Complex-C (SUPER B COMPLEX/VITAMIN C PO) Take by mouth    Calcium Carbonate-Vitamin D 600-400 MG-UNIT per chew tablet Chew 1 tablet 2 (two) times a day for 90 days    cetirizine (ZyrTEC) 10 mg tablet Take 10 mg by mouth daily    Cranberry 450 MG TABS Take by mouth    Cyanocobalamin (VITAMIN B-12 PO) Take by mouth    gabapentin (NEURONTIN) 300 mg capsule Take 1 capsule (300 mg total) by mouth 3 (three)  times a day Please change from bid to tid    magnesium gluconate (MAGONATE) 500 mg tablet Take 500 mg by mouth 1 (one) time    metFORMIN (GLUCOPHAGE-XR) 500 mg 24 hr tablet TAKE 1 TABLET BY MOUTH TWICE A DAY WITH MEALS    Multiple Vitamins-Minerals (MULTIVITAMIN ADULT PO) Take by mouth    pantoprazole (PROTONIX) 40 mg tablet TAKE 1 TABLET BY MOUTH EVERY DAY    TURMERIC PO Take by mouth    [DISCONTINUED] cyclobenzaprine (FLEXERIL) 10 mg tablet Take 1 tablet (10 mg total) by mouth daily at bedtime    [DISCONTINUED] telmisartan (MICARDIS) 40 mg tablet TAKE 1 TABLET BY MOUTH EVERY DAY    albuterol (2.5 mg/3 mL) 0.083 % nebulizer solution Take 3 mL (2.5 mg total) by nebulization every 4 (four) hours as needed for wheezing or shortness of breath (Patient not taking: Reported on 12/30/2024)    fluticasone-salmeterol (ADVAIR DISKUS) 250-50 mcg/dose inhaler Inhale 1 puff 2 (two) times a day (Patient not taking: Reported on 9/20/2024)    methylPREDNISolone 4 MG tablet therapy pack Use as directed on package (Patient not taking: Reported on 9/20/2024)    sucralfate (CARAFATE) 1 g tablet Take 1 tablet (1 g total) by mouth 4 (four) times a day for 7 days (Patient not taking: Reported on 12/30/2024)     Allergies   Allergen Reactions    Sulfa Antibiotics Rash     Immunization History   Administered Date(s) Administered    COVID-19 MODERNA VACC 0.5 ML IM 03/18/2021, 04/15/2021, 10/25/2021    INFLUENZA 11/08/2017, 11/19/2018, 10/02/2021, 10/04/2022    Influenza Quadrivalent Preservative Free 3 years and older IM 11/08/2017    Influenza Split High Dose Preservative Free IM 11/19/2018, 10/01/2024    Influenza Whole 11/08/2012, 10/23/2013, 10/24/2014    Influenza, Quadrivalent (nasal) 11/08/2017    Influenza, high dose seasonal 0.7 mL 10/15/2019, 10/06/2020, 09/20/2023    Influenza, seasonal, injectable 11/21/2016    Influenza, seasonal, injectable, preservative free 11/02/2015    Pneumococcal Conjugate 13-Valent 04/11/2019     "Pneumococcal Polysaccharide PPV23 05/06/2013, 11/05/2020    Respiratory Syncytial Virus Vaccine (Recombinant, Adjuvanted) 12/27/2023    Tdap 08/10/2016    Zoster 06/02/2014    Zoster Vaccine Recombinant 11/17/2021, 01/18/2022     Objective   /52   Pulse 99   Temp 97.8 °F (36.6 °C) (Temporal)   Ht 5' 0.75\" (1.543 m)   Wt 81 kg (178 lb 9.6 oz)   SpO2 93%   BMI 34.02 kg/m²     Physical Exam  Vitals reviewed.   Constitutional:       General: She is not in acute distress.     Appearance: Normal appearance. She is obese. She is not ill-appearing, toxic-appearing or diaphoretic.   Cardiovascular:      Rate and Rhythm: Normal rate.      Pulses: Normal pulses.   Pulmonary:      Effort: Pulmonary effort is normal.   Abdominal:      Palpations: Abdomen is soft.   Musculoskeletal:         General: No swelling or deformity.   Skin:     General: Skin is warm.      Capillary Refill: Capillary refill takes less than 2 seconds.   Neurological:      General: No focal deficit present.      Mental Status: She is alert.   Psychiatric:         Mood and Affect: Mood normal.             Edu Esquivel M.D.  Family Medicine    Please excuse any \"sound-alike\" errors that may have ocurred during the process of dictation. Parts of this note have been dictated and there may be errors present in the transcription process. Thank you.    "

## 2025-02-02 DIAGNOSIS — E11.22 TYPE 2 DIABETES MELLITUS WITH STAGE 3 CHRONIC KIDNEY DISEASE, WITHOUT LONG-TERM CURRENT USE OF INSULIN (HCC): ICD-10-CM

## 2025-02-02 DIAGNOSIS — N18.30 TYPE 2 DIABETES MELLITUS WITH STAGE 3 CHRONIC KIDNEY DISEASE, WITHOUT LONG-TERM CURRENT USE OF INSULIN (HCC): ICD-10-CM

## 2025-02-03 RX ORDER — METFORMIN HYDROCHLORIDE 500 MG/1
500 TABLET, EXTENDED RELEASE ORAL 2 TIMES DAILY WITH MEALS
Qty: 60 TABLET | Refills: 0 | Status: SHIPPED | OUTPATIENT
Start: 2025-02-03

## 2025-02-04 ENCOUNTER — APPOINTMENT (OUTPATIENT)
Dept: LAB | Facility: MEDICAL CENTER | Age: 75
End: 2025-02-04
Payer: COMMERCIAL

## 2025-02-04 DIAGNOSIS — E11.40 TYPE 2 DIABETES MELLITUS WITH DIABETIC NEUROPATHY, WITHOUT LONG-TERM CURRENT USE OF INSULIN (HCC): ICD-10-CM

## 2025-02-04 DIAGNOSIS — Z00.00 MEDICARE ANNUAL WELLNESS VISIT, SUBSEQUENT: ICD-10-CM

## 2025-02-04 DIAGNOSIS — I10 ESSENTIAL HYPERTENSION: ICD-10-CM

## 2025-02-04 DIAGNOSIS — E78.5 DYSLIPIDEMIA: ICD-10-CM

## 2025-02-04 LAB
ALBUMIN SERPL BCG-MCNC: 4.3 G/DL (ref 3.5–5)
ALP SERPL-CCNC: 59 U/L (ref 34–104)
ALT SERPL W P-5'-P-CCNC: 16 U/L (ref 7–52)
ANION GAP SERPL CALCULATED.3IONS-SCNC: 7 MMOL/L (ref 4–13)
AST SERPL W P-5'-P-CCNC: 15 U/L (ref 13–39)
BASOPHILS # BLD AUTO: 0.06 THOUSANDS/ΜL (ref 0–0.1)
BASOPHILS NFR BLD AUTO: 1 % (ref 0–1)
BILIRUB SERPL-MCNC: 0.55 MG/DL (ref 0.2–1)
BUN SERPL-MCNC: 15 MG/DL (ref 5–25)
CALCIUM SERPL-MCNC: 9.5 MG/DL (ref 8.4–10.2)
CHLORIDE SERPL-SCNC: 100 MMOL/L (ref 96–108)
CHOLEST SERPL-MCNC: 113 MG/DL (ref ?–200)
CO2 SERPL-SCNC: 33 MMOL/L (ref 21–32)
CREAT SERPL-MCNC: 1 MG/DL (ref 0.6–1.3)
EOSINOPHIL # BLD AUTO: 0.3 THOUSAND/ΜL (ref 0–0.61)
EOSINOPHIL NFR BLD AUTO: 3 % (ref 0–6)
ERYTHROCYTE [DISTWIDTH] IN BLOOD BY AUTOMATED COUNT: 12.6 % (ref 11.6–15.1)
GFR SERPL CREATININE-BSD FRML MDRD: 56 ML/MIN/1.73SQ M
GLUCOSE P FAST SERPL-MCNC: 167 MG/DL (ref 65–99)
HCT VFR BLD AUTO: 46.7 % (ref 34.8–46.1)
HDLC SERPL-MCNC: 46 MG/DL
HGB BLD-MCNC: 14.5 G/DL (ref 11.5–15.4)
IMM GRANULOCYTES # BLD AUTO: 0.02 THOUSAND/UL (ref 0–0.2)
IMM GRANULOCYTES NFR BLD AUTO: 0 % (ref 0–2)
LDLC SERPL CALC-MCNC: 34 MG/DL (ref 0–100)
LYMPHOCYTES # BLD AUTO: 3.04 THOUSANDS/ΜL (ref 0.6–4.47)
LYMPHOCYTES NFR BLD AUTO: 32 % (ref 14–44)
MCH RBC QN AUTO: 28.8 PG (ref 26.8–34.3)
MCHC RBC AUTO-ENTMCNC: 31 G/DL (ref 31.4–37.4)
MCV RBC AUTO: 93 FL (ref 82–98)
MONOCYTES # BLD AUTO: 0.82 THOUSAND/ΜL (ref 0.17–1.22)
MONOCYTES NFR BLD AUTO: 9 % (ref 4–12)
NEUTROPHILS # BLD AUTO: 5.26 THOUSANDS/ΜL (ref 1.85–7.62)
NEUTS SEG NFR BLD AUTO: 55 % (ref 43–75)
NONHDLC SERPL-MCNC: 67 MG/DL
NRBC BLD AUTO-RTO: 0 /100 WBCS
PLATELET # BLD AUTO: 231 THOUSANDS/UL (ref 149–390)
PMV BLD AUTO: 10.6 FL (ref 8.9–12.7)
POTASSIUM SERPL-SCNC: 4.2 MMOL/L (ref 3.5–5.3)
PROT SERPL-MCNC: 6.9 G/DL (ref 6.4–8.4)
RBC # BLD AUTO: 5.03 MILLION/UL (ref 3.81–5.12)
SODIUM SERPL-SCNC: 140 MMOL/L (ref 135–147)
TRIGL SERPL-MCNC: 167 MG/DL (ref ?–150)
WBC # BLD AUTO: 9.5 THOUSAND/UL (ref 4.31–10.16)

## 2025-02-04 PROCEDURE — 80061 LIPID PANEL: CPT

## 2025-02-04 PROCEDURE — 36415 COLL VENOUS BLD VENIPUNCTURE: CPT

## 2025-02-04 PROCEDURE — 85025 COMPLETE CBC W/AUTO DIFF WBC: CPT

## 2025-02-04 PROCEDURE — 80053 COMPREHEN METABOLIC PANEL: CPT

## 2025-02-19 DIAGNOSIS — R10.13 DYSPEPSIA: ICD-10-CM

## 2025-02-20 RX ORDER — PANTOPRAZOLE SODIUM 40 MG/1
40 TABLET, DELAYED RELEASE ORAL DAILY
Qty: 90 TABLET | Refills: 1 | Status: SHIPPED | OUTPATIENT
Start: 2025-02-20

## 2025-02-26 DIAGNOSIS — N18.30 TYPE 2 DIABETES MELLITUS WITH STAGE 3 CHRONIC KIDNEY DISEASE, WITHOUT LONG-TERM CURRENT USE OF INSULIN (HCC): ICD-10-CM

## 2025-02-26 DIAGNOSIS — E11.22 TYPE 2 DIABETES MELLITUS WITH STAGE 3 CHRONIC KIDNEY DISEASE, WITHOUT LONG-TERM CURRENT USE OF INSULIN (HCC): ICD-10-CM

## 2025-02-28 RX ORDER — METFORMIN HYDROCHLORIDE 500 MG/1
500 TABLET, EXTENDED RELEASE ORAL 2 TIMES DAILY WITH MEALS
Qty: 180 TABLET | Refills: 1 | Status: SHIPPED | OUTPATIENT
Start: 2025-02-28

## 2025-03-13 DIAGNOSIS — E78.5 DYSLIPIDEMIA: ICD-10-CM

## 2025-03-13 RX ORDER — ATORVASTATIN CALCIUM 40 MG/1
40 TABLET, FILM COATED ORAL DAILY
Qty: 90 TABLET | Refills: 1 | Status: SHIPPED | OUTPATIENT
Start: 2025-03-13

## 2025-03-21 ENCOUNTER — RA CDI HCC (OUTPATIENT)
Dept: OTHER | Facility: HOSPITAL | Age: 75
End: 2025-03-21

## 2025-03-28 ENCOUNTER — OFFICE VISIT (OUTPATIENT)
Dept: FAMILY MEDICINE CLINIC | Facility: CLINIC | Age: 75
End: 2025-03-28
Payer: COMMERCIAL

## 2025-03-28 VITALS
TEMPERATURE: 97.6 F | SYSTOLIC BLOOD PRESSURE: 140 MMHG | HEIGHT: 61 IN | OXYGEN SATURATION: 93 % | DIASTOLIC BLOOD PRESSURE: 58 MMHG | WEIGHT: 178.4 LBS | HEART RATE: 102 BPM | BODY MASS INDEX: 33.68 KG/M2

## 2025-03-28 DIAGNOSIS — E11.40 TYPE 2 DIABETES MELLITUS WITH DIABETIC NEUROPATHY, WITHOUT LONG-TERM CURRENT USE OF INSULIN (HCC): ICD-10-CM

## 2025-03-28 DIAGNOSIS — J44.9 CHRONIC OBSTRUCTIVE PULMONARY DISEASE, UNSPECIFIED COPD TYPE (HCC): ICD-10-CM

## 2025-03-28 DIAGNOSIS — G62.9 NEUROPATHY: ICD-10-CM

## 2025-03-28 DIAGNOSIS — Z72.0 TOBACCO USE: ICD-10-CM

## 2025-03-28 DIAGNOSIS — G47.33 OSA ON CPAP: ICD-10-CM

## 2025-03-28 DIAGNOSIS — Z01.818 PRE-OP EXAMINATION: ICD-10-CM

## 2025-03-28 DIAGNOSIS — Z01.818 PRE-OPERATIVE CLEARANCE: Primary | ICD-10-CM

## 2025-03-28 DIAGNOSIS — I10 ESSENTIAL HYPERTENSION: ICD-10-CM

## 2025-03-28 PROCEDURE — G2211 COMPLEX E/M VISIT ADD ON: HCPCS | Performed by: INTERNAL MEDICINE

## 2025-03-28 PROCEDURE — 93000 ELECTROCARDIOGRAM COMPLETE: CPT | Performed by: INTERNAL MEDICINE

## 2025-03-28 PROCEDURE — 99214 OFFICE O/P EST MOD 30 MIN: CPT | Performed by: INTERNAL MEDICINE

## 2025-03-28 RX ORDER — GABAPENTIN 300 MG/1
300 CAPSULE ORAL 3 TIMES DAILY
Qty: 270 CAPSULE | Refills: 0 | Status: SHIPPED | OUTPATIENT
Start: 2025-03-28

## 2025-03-28 RX ORDER — ALBUTEROL SULFATE 0.83 MG/ML
2.5 SOLUTION RESPIRATORY (INHALATION) EVERY 4 HOURS PRN
Qty: 125 ML | Refills: 1 | Status: SHIPPED | OUTPATIENT
Start: 2025-03-28

## 2025-03-28 RX ORDER — OFLOXACIN 3 MG/ML
SOLUTION/ DROPS OPHTHALMIC
COMMUNITY
Start: 2024-12-23

## 2025-03-28 RX ORDER — KETOROLAC TROMETHAMINE 5 MG/ML
SOLUTION OPHTHALMIC
COMMUNITY
Start: 2024-12-23

## 2025-03-28 RX ORDER — PREDNISOLONE ACETATE 10 MG/ML
SUSPENSION/ DROPS OPHTHALMIC
COMMUNITY
Start: 2024-12-23

## 2025-03-28 NOTE — ASSESSMENT & PLAN NOTE
Orders:    gabapentin (NEURONTIN) 300 mg capsule; Take 1 capsule (300 mg total) by mouth 3 (three) times a day Please change from bid to tid

## 2025-03-28 NOTE — PROGRESS NOTES
Pre-operative Clearance  Name: Irma Rodriguez      : 1950      MRN: [id number]  Encounter Provider: Danielle Zepeda MD  Encounter Date: 3/28/2025   Encounter department: [facility]    Assessment & Plan  Pre-operative clearance    Orders:    POCT ECG    Neuropathy    Orders:    gabapentin (NEURONTIN) 300 mg capsule; Take 1 capsule (300 mg total) by mouth 3 (three) times a day Please change from bid to tid    Type 2 diabetes mellitus with diabetic neuropathy, without long-term current use of insulin ([facility])    Lab Results   Component Value Date    HGBA1C 7.0 (A) 2024       Orders:    albuterol (2.5 mg/3 mL) 0.083 % nebulizer solution; Take 3 mL (2.5 mg total) by nebulization every 4 (four) hours as needed for wheezing or shortness of breath    Chronic obstructive pulmonary disease, unspecified COPD type (HCC)  She does have known COPD and has he had inhalers for but she still smokes         Essential hypertension  Patient has longstanding hypertension that is well-controlled on meds         NASEEM on CPAP  Reportedly she used CPAP to sleep at night         Pre-op examination  Patient is medically cleared for surgery for cataracts         Tobacco use  She continues to smoke         Pre-operative Clearance:     Revised Cardiac Risk Index:  RCI RISK CLASS I (0 risk factors, risk of major cardiac complications approximately 0.5%)    Clearance:  Patient is medically optimized (CLEARED) for proposed surgery without any additional cardiac testing.      Medication Instructions:   - Avoid herbs or non-directed vitamins one week prior to surgery    - Avoid aspirin containing medications or non-steroidal anti-inflammatory drugs one week preceding surgery    - May take tylenol for pain up until the night before surgery    - ACE Inhibitors or ARBs: Continue this medication up to the evening before surgery/procedure, but do not take the morning of the day of surgery.  - Antiepileptic meds: Continue to take  this medication on your normal schedule.  - Calcium channel blockers: Continue to take this medication on your normal schedule.  - Hyperlipidemia meds: Continue to take this medication on your normal schedule.      Medicine Instructions for Adults with Diabetes (NO Bowel Prep)    Follow these instructions when a BOWEL PREP is NOT required for your procedure or surgery!    NOTE:  GLP Agonists taken weekly: do not take in the 7 days before your procedure. **Bariatric surgery: do not take 4 weeks prior to your procedure.    SGLT-2 Inhibitors: do not take in the 4 days before your procedure    On the Day Before Surgery/Procedure  If you are having a procedure (e.g., Colonoscopy) or surgery which DOES NOT require a bowel prep, follow the directions below based on the type of medicine you take for your diabetes.  Type of Medicine You Take Examples What to Do   Pre-Mixed Insulin Intermediate  Gqcchwt89/25, Yzxashj84/30, Novolog 70/30, Regular Insulin Take 1/2 your regular dose the evening before our procedure.   Rapid/Fast Acting  Insulin and/or Long-Acting Insulin Humalog U200, NovoLog, Apidra,  Lantus, Levemir, Tresiba, Toujeo,  Fias, Basaglar Take your FULL regular dose the day before procedure.   Oral Diabetic Medicines (sulfonylurea) Glipizide/Glimepiride/  Glucotrol Take your regular dose with dinner the evening before your procedure.   Other Oral Diabetic Medicines Metformin, Glucophage, Glucophage  XR, Riomet, Glumetza, Actose,  Avandia, Gl set, Prandin Take your regular dose with dinner the evening before your procedure   GLP Agonists Adlyxin, Byetta, Bydureon,  Ozempic, Soliqua, Tanzeum,  Trulicity, Victoza, Saxenda,  Rybelsus, Wegovy, Mounjaro, Zepbound If taken daily, take as normal  If taken weekly, do not take this medicine for 7 days before your procedure including the day of the procedure (resume taking after the procedure). **Bariatric surgery: do not take 4 weeks prior to procedure   SGLT-2 Inhibitors  Jardiance, Invokana, Farxiga, Steglatro, Brenzavvy, Qtern, Segluromet Glyxambi, Synjardy, Synjardy XR, Invokamet, InvokametXR, Trijary XR, Xigduo X Do not take for 4 days before your procedure including the day of the procedure (resume taking after the procedure)   This educational material has been approved by the Patient Education Advisory Committee.    On the Day of Surgery/Procedure  Follow the directions below based on the type of medicine you take for your diabetes.  Type of Medicine You Take  Examples What to Do   Long-Acting Insulin Lantus, Levemir, Tresiba,  Toujeo, Basaglar, Semglee If you normally take your Long Acting Insulin in the morning, take the full dose as scheduled.   GLP-I Agonists Adlyxin, Byetta, Bydureon,  Ozempic, Soliqua, Tanzeum,  Trulicity, Victoza, Saxenda,  Rybelsus, Mounjaro Do NOT take this medicine on the day of your procedure (resume taking after the procedure)   Except for the morning Long-Acting Insulin, DO NOT take ANY diabetic medicine on the day of your procedure unless you were instructed by the doctor who manages your diabetes medicines.  Continue to check your blood sugars.  If you have an insulin pump, ask your endocrinologist for instructions at least 3 days before your procedure. NOTE: If you are not able to ask your endocrinologist in advance, on the day of the procedure set your insulin pump to your basal rate only. Bring your insulin pump supplies to the hospital.        Tobacco Cessation Counseling: Tobacco cessation counseling was provided. The patient is sincerely urged to quit consumption of tobacco. She is not ready to quit tobacco.       History of Present Illness     Patient is a diabetic with high blood pressure obesity probable sleep apnea.  He was scheduled for cataract surgery and is cleared for same.    Pre-op Exam  Surgery: cataract  Anticipated Date of Surgery: 4/11  Surgeon: Higinio    Pt  for catact surg bilat. PT with hx COPD or CP    Previous history  of bleeding disorders or clots?: No  Previous Anesthesia reaction?: No  Prolonged steroid use in the last 6 months?: No    Assessment of Cardiac Risk:   - Unstable or severe angina or MI in the last 6 weeks or history of stent placement in the last year?: No   - Decompensated heart failure (e.g. New onset heart failure, NYHA  Class IV heart failure, or worsening existing heart failure)?: No  - Significant arrhythmias such as high grade AV block, symptomatic ventricular arrhythmia, newly recognized ventricular tachycardia, supraventricular tachycardia with resting heart rate >100, or symptomatic bradycardia?: No  - Severe heart valve disease including aortic stenosis or symptomatic mitral stenosis?: No      Pre-operative Risk Factors:  Elevated-risk surgery: No    History of cerebrovascular disease: No    History of ischemic heart disease: No  Pre-operative treatment with insulin: No  Pre-operative creatinine >2 mg/dL: No    History of congestive heart failure: No    Medications of Perioperative Concern:   Calcium channel blockers and ACE inhibitors/ARBs    Review of Systems   Constitutional:  Negative for chills and fever.   HENT: Negative.  Negative for ear pain and sore throat.         Chronic hoarseness   Eyes:  Negative for pain and visual disturbance.   Respiratory:  Positive for shortness of breath (BENITO) and wheezing (occ). Negative for cough.    Cardiovascular:  Negative for chest pain and palpitations.   Gastrointestinal:  Negative for abdominal pain and vomiting.   Endocrine: Positive for polyuria.   Genitourinary: Negative.  Negative for dysuria and hematuria.   Musculoskeletal:  Negative for arthralgias and back pain.   Skin:  Negative for color change and rash.   Allergic/Immunologic: Negative for immunocompromised state.   Neurological:  Negative for dizziness, seizures and syncope.   Hematological:  Does not bruise/bleed easily.   Psychiatric/Behavioral: Negative.  Negative for confusion and decreased  concentration.    All other systems reviewed and are negative.    Past Medical History   Past Medical History:   Diagnosis Date    Asthma     Back pain     COPD (chronic obstructive pulmonary disease) (HCC)     COPD (chronic obstructive pulmonary disease) (HCC)     DVT (deep venous thrombosis) (HCC)     GERD (gastroesophageal reflux disease)     Hyperlipidemia     Hypertension      Past Surgical History:   Procedure Laterality Date    APPENDECTOMY      BREAST BIOPSY      BREAST CYST EXCISION Left 2010     SECTION      COLONOSCOPY      ESOPHAGOGASTRODUODENOSCOPY      HYSTERECTOMY      OOPHORECTOMY Bilateral      Family History   Problem Relation Age of Onset    Colon cancer Mother 42    Esophageal cancer Father 56    Skin cancer Sister     No Known Problems Daughter     No Known Problems Maternal Grandmother     No Known Problems Maternal Grandfather     No Known Problems Paternal Grandmother     No Known Problems Paternal Grandfather     No Known Problems Son      Social History     Tobacco Use    Smoking status: Every Day     Current packs/day: 1.00     Average packs/day: 1 pack/day for 54.0 years (54.0 ttl pk-yrs)     Types: Cigarettes    Smokeless tobacco: Never   Vaping Use    Vaping status: Never Used   Substance and Sexual Activity    Alcohol use: No    Drug use: No    Sexual activity: Not on file     Current Outpatient Medications on File Prior to Visit   Medication Sig    amLODIPine (NORVASC) 10 mg tablet TAKE 1 TABLET BY MOUTH EVERY DAY    ascorbic acid (VITAMIN C) 500 MG tablet Take 500 mg by mouth daily    atorvastatin (LIPITOR) 40 mg tablet TAKE 1 TABLET BY MOUTH EVERY DAY    B Complex-C (SUPER B COMPLEX/VITAMIN C PO) Take by mouth    Calcium Carbonate-Vitamin D 600-400 MG-UNIT per chew tablet Chew 1 tablet 2 (two) times a day for 90 days    cetirizine (ZyrTEC) 10 mg tablet Take 10 mg by mouth daily    Cranberry 450 MG TABS Take by mouth    Cyanocobalamin (VITAMIN B-12 PO) Take by mouth     "cyclobenzaprine (FLEXERIL) 10 mg tablet Take 1 tablet (10 mg total) by mouth daily at bedtime    gabapentin (NEURONTIN) 300 mg capsule Take 1 capsule (300 mg total) by mouth 3 (three) times a day Please change from bid to tid    ketorolac (ACULAR) 0.5 % ophthalmic solution INSTILL 1 DROP INTO AFFECTED EYE 4 X A DAY AS DIRECTED IN SURGICAL EYE START 3 DAYS PRIOR TO SURGERY    magnesium gluconate (MAGONATE) 500 mg tablet Take 500 mg by mouth 1 (one) time    metFORMIN (GLUCOPHAGE-XR) 500 mg 24 hr tablet TAKE 1 TABLET BY MOUTH TWICE A DAY WITH FOOD    Multiple Vitamins-Minerals (MULTIVITAMIN ADULT PO) Take by mouth    ofloxacin (OCUFLOX) 0.3 % ophthalmic solution INSTILL 1 DROP INTO AFFECTED EYE 4 X A DAY IN SURGICAL EYE 4 X A DAY START 3 DAYS PRIOR TO SURGERY    pantoprazole (PROTONIX) 40 mg tablet TAKE 1 TABLET BY MOUTH EVERY DAY    prednisoLONE acetate (PRED FORTE) 1 % ophthalmic suspension INSTILL 1 DROP INTO AFFECTED EYE 4 X A DAY AS DIRECTED IN SURGICAL EYE START 3 DAYS PRIOR TO SURGERY    sucralfate (CARAFATE) 1 g tablet Take 1 tablet (1 g total) by mouth 4 (four) times a day for 7 days    telmisartan (MICARDIS) 40 mg tablet Take 1 tablet (40 mg total) by mouth daily    TURMERIC PO Take by mouth    albuterol (2.5 mg/3 mL) 0.083 % nebulizer solution Take 3 mL (2.5 mg total) by nebulization every 4 (four) hours as needed for wheezing or shortness of breath (Patient not taking: Reported on 3/28/2025)    [DISCONTINUED] fluticasone-salmeterol (ADVAIR DISKUS) 250-50 mcg/dose inhaler Inhale 1 puff 2 (two) times a day (Patient not taking: Reported on 9/20/2024)    [DISCONTINUED] methylPREDNISolone 4 MG tablet therapy pack Use as directed on package (Patient not taking: Reported on 9/20/2024)     Allergies   Allergen Reactions    Sulfa Antibiotics Rash     Objective   /58 (BP Location: Left arm, Patient Position: Sitting, Cuff Size: Standard)   Pulse 102   Temp 97.6 °F (36.4 °C)   Ht 5' 0.75\" (1.543 m)   Wt 80.9 " kg (178 lb 6.4 oz)   SpO2 93%   Breastfeeding No   BMI 33.99 kg/m²     Physical Exam  Constitutional:       Appearance: She is obese.   HENT:      Head: Normocephalic and atraumatic.      Right Ear: Tympanic membrane normal.      Left Ear: Tympanic membrane normal.      Nose: Nose normal.      Mouth/Throat:      Mouth: Mucous membranes are dry.   Eyes:      Extraocular Movements: Extraocular movements intact.      Pupils: Pupils are equal, round, and reactive to light.   Neck:      Vascular: Carotid bruit present.   Cardiovascular:      Rate and Rhythm: Normal rate and regular rhythm.      Pulses: Normal pulses.      Heart sounds: Normal heart sounds.   Pulmonary:      Effort: Pulmonary effort is normal.      Breath sounds: Normal breath sounds. No wheezing, rhonchi or rales.   Abdominal:      General: Abdomen is flat. There is no distension.      Palpations: Abdomen is soft.      Tenderness: There is no abdominal tenderness.   Musculoskeletal:      Right lower leg: No edema.      Left lower leg: No edema.   Lymphadenopathy:      Cervical: No cervical adenopathy.   Skin:     General: Skin is warm and dry.      Findings: No rash.   Neurological:      General: No focal deficit present.      Mental Status: She is alert and oriented to person, place, and time. Mental status is at baseline.      Cranial Nerves: No cranial nerve deficit.   Psychiatric:         Mood and Affect: Mood normal.         Behavior: Behavior normal.           Danielle Zepeda MD

## 2025-03-28 NOTE — ASSESSMENT & PLAN NOTE
Lab Results   Component Value Date    HGBA1C 7.0 (A) 12/30/2024       Orders:    albuterol (2.5 mg/3 mL) 0.083 % nebulizer solution; Take 3 mL (2.5 mg total) by nebulization every 4 (four) hours as needed for wheezing or shortness of breath